# Patient Record
Sex: MALE | Race: WHITE | Employment: OTHER | URBAN - METROPOLITAN AREA
[De-identification: names, ages, dates, MRNs, and addresses within clinical notes are randomized per-mention and may not be internally consistent; named-entity substitution may affect disease eponyms.]

---

## 2017-06-01 ENCOUNTER — APPOINTMENT (OUTPATIENT)
Dept: GENERAL RADIOLOGY | Age: 82
End: 2017-06-01
Attending: EMERGENCY MEDICINE
Payer: MEDICARE

## 2017-06-01 ENCOUNTER — HOSPITAL ENCOUNTER (EMERGENCY)
Age: 82
Discharge: HOME | End: 2017-06-02
Attending: EMERGENCY MEDICINE | Admitting: EMERGENCY MEDICINE
Payer: MEDICARE

## 2017-06-01 ENCOUNTER — APPOINTMENT (OUTPATIENT)
Dept: CT IMAGING | Age: 82
End: 2017-06-01
Attending: PHYSICIAN ASSISTANT
Payer: MEDICARE

## 2017-06-01 VITALS
BODY MASS INDEX: 40.83 KG/M2 | RESPIRATION RATE: 18 BRPM | DIASTOLIC BLOOD PRESSURE: 75 MMHG | WEIGHT: 260.14 LBS | OXYGEN SATURATION: 96 % | TEMPERATURE: 98.7 F | HEIGHT: 67 IN | SYSTOLIC BLOOD PRESSURE: 114 MMHG | HEART RATE: 92 BPM

## 2017-06-01 DIAGNOSIS — S22.32XA CLOSED FRACTURE OF ONE RIB OF LEFT SIDE, INITIAL ENCOUNTER: ICD-10-CM

## 2017-06-01 DIAGNOSIS — R31.9 HEMATURIA: ICD-10-CM

## 2017-06-01 DIAGNOSIS — N39.0 ACUTE UTI (URINARY TRACT INFECTION): Primary | ICD-10-CM

## 2017-06-01 LAB
ALBUMIN SERPL BCP-MCNC: 3.2 G/DL (ref 3.5–5)
ALBUMIN/GLOB SERPL: 0.8 {RATIO} (ref 1.1–2.2)
ALP SERPL-CCNC: 50 U/L (ref 45–117)
ALT SERPL-CCNC: 26 U/L (ref 12–78)
ANION GAP BLD CALC-SCNC: 7 MMOL/L (ref 5–15)
APPEARANCE UR: ABNORMAL
AST SERPL W P-5'-P-CCNC: 14 U/L (ref 15–37)
BACTERIA URNS QL MICRO: ABNORMAL /HPF
BASOPHILS # BLD AUTO: 0 K/UL (ref 0–0.1)
BASOPHILS # BLD: 0 % (ref 0–1)
BILIRUB SERPL-MCNC: 0.7 MG/DL (ref 0.2–1)
BILIRUB UR QL CFM: ABNORMAL
BUN SERPL-MCNC: 19 MG/DL (ref 6–20)
BUN/CREAT SERPL: 17 (ref 12–20)
CALCIUM SERPL-MCNC: 9.4 MG/DL (ref 8.5–10.1)
CHLORIDE SERPL-SCNC: 100 MMOL/L (ref 97–108)
CO2 SERPL-SCNC: 29 MMOL/L (ref 21–32)
COLOR UR: ABNORMAL
CREAT SERPL-MCNC: 1.09 MG/DL (ref 0.7–1.3)
EOSINOPHIL # BLD: 0 K/UL (ref 0–0.4)
EOSINOPHIL NFR BLD: 0 % (ref 0–7)
EPITH CASTS URNS QL MICRO: ABNORMAL /LPF
ERYTHROCYTE [DISTWIDTH] IN BLOOD BY AUTOMATED COUNT: 15.5 % (ref 11.5–14.5)
GLOBULIN SER CALC-MCNC: 4.1 G/DL (ref 2–4)
GLUCOSE SERPL-MCNC: 151 MG/DL (ref 65–100)
GLUCOSE UR STRIP.AUTO-MCNC: NEGATIVE MG/DL
HCT VFR BLD AUTO: 45.9 % (ref 36.6–50.3)
HGB BLD-MCNC: 14.9 G/DL (ref 12.1–17)
HGB UR QL STRIP: ABNORMAL
KETONES UR QL STRIP.AUTO: ABNORMAL MG/DL
LEUKOCYTE ESTERASE UR QL STRIP.AUTO: ABNORMAL
LYMPHOCYTES # BLD AUTO: 8 % (ref 12–49)
LYMPHOCYTES # BLD: 1.1 K/UL (ref 0.8–3.5)
MCH RBC QN AUTO: 30.5 PG (ref 26–34)
MCHC RBC AUTO-ENTMCNC: 32.5 G/DL (ref 30–36.5)
MCV RBC AUTO: 94.1 FL (ref 80–99)
MONOCYTES # BLD: 1.1 K/UL (ref 0–1)
MONOCYTES NFR BLD AUTO: 8 % (ref 5–13)
NEUTS SEG # BLD: 11.2 K/UL (ref 1.8–8)
NEUTS SEG NFR BLD AUTO: 84 % (ref 32–75)
NITRITE UR QL STRIP.AUTO: POSITIVE
PH UR STRIP: 5.5 [PH] (ref 5–8)
PLATELET # BLD AUTO: 192 K/UL (ref 150–400)
POTASSIUM SERPL-SCNC: 4 MMOL/L (ref 3.5–5.1)
PROT SERPL-MCNC: 7.3 G/DL (ref 6.4–8.2)
PROT UR STRIP-MCNC: 100 MG/DL
RBC # BLD AUTO: 4.88 M/UL (ref 4.1–5.7)
RBC #/AREA URNS HPF: >100 /HPF (ref 0–5)
SODIUM SERPL-SCNC: 136 MMOL/L (ref 136–145)
SP GR UR REFRACTOMETRY: 1.02 (ref 1–1.03)
UA: UC IF INDICATED,UAUC: ABNORMAL
UROBILINOGEN UR QL STRIP.AUTO: 1 EU/DL (ref 0.2–1)
WBC # BLD AUTO: 13.4 K/UL (ref 4.1–11.1)
WBC URNS QL MICRO: ABNORMAL /HPF (ref 0–4)

## 2017-06-01 PROCEDURE — 96361 HYDRATE IV INFUSION ADD-ON: CPT

## 2017-06-01 PROCEDURE — 87086 URINE CULTURE/COLONY COUNT: CPT | Performed by: EMERGENCY MEDICINE

## 2017-06-01 PROCEDURE — 74011250636 HC RX REV CODE- 250/636: Performed by: EMERGENCY MEDICINE

## 2017-06-01 PROCEDURE — 80053 COMPREHEN METABOLIC PANEL: CPT | Performed by: PHYSICIAN ASSISTANT

## 2017-06-01 PROCEDURE — 74011250636 HC RX REV CODE- 250/636: Performed by: PHYSICIAN ASSISTANT

## 2017-06-01 PROCEDURE — 71101 X-RAY EXAM UNILAT RIBS/CHEST: CPT

## 2017-06-01 PROCEDURE — 87186 SC STD MICRODIL/AGAR DIL: CPT | Performed by: EMERGENCY MEDICINE

## 2017-06-01 PROCEDURE — 99284 EMERGENCY DEPT VISIT MOD MDM: CPT

## 2017-06-01 PROCEDURE — 36415 COLL VENOUS BLD VENIPUNCTURE: CPT | Performed by: PHYSICIAN ASSISTANT

## 2017-06-01 PROCEDURE — 74011250637 HC RX REV CODE- 250/637: Performed by: PHYSICIAN ASSISTANT

## 2017-06-01 PROCEDURE — 74011636320 HC RX REV CODE- 636/320: Performed by: EMERGENCY MEDICINE

## 2017-06-01 PROCEDURE — 96365 THER/PROPH/DIAG IV INF INIT: CPT

## 2017-06-01 PROCEDURE — 74011000258 HC RX REV CODE- 258: Performed by: PHYSICIAN ASSISTANT

## 2017-06-01 PROCEDURE — 87077 CULTURE AEROBIC IDENTIFY: CPT | Performed by: EMERGENCY MEDICINE

## 2017-06-01 PROCEDURE — 85025 COMPLETE CBC W/AUTO DIFF WBC: CPT | Performed by: PHYSICIAN ASSISTANT

## 2017-06-01 PROCEDURE — 81001 URINALYSIS AUTO W/SCOPE: CPT | Performed by: EMERGENCY MEDICINE

## 2017-06-01 PROCEDURE — 74177 CT ABD & PELVIS W/CONTRAST: CPT

## 2017-06-01 RX ORDER — TAMSULOSIN HYDROCHLORIDE 0.4 MG/1
0.4 CAPSULE ORAL
Status: DISCONTINUED | OUTPATIENT
Start: 2017-06-01 | End: 2017-06-02

## 2017-06-01 RX ORDER — SODIUM CHLORIDE 0.9 % (FLUSH) 0.9 %
10 SYRINGE (ML) INJECTION
Status: COMPLETED | OUTPATIENT
Start: 2017-06-01 | End: 2017-06-01

## 2017-06-01 RX ORDER — SODIUM CHLORIDE 9 MG/ML
50 INJECTION, SOLUTION INTRAVENOUS
Status: COMPLETED | OUTPATIENT
Start: 2017-06-01 | End: 2017-06-02

## 2017-06-01 RX ORDER — HYDROCODONE BITARTRATE AND ACETAMINOPHEN 5; 325 MG/1; MG/1
1 TABLET ORAL
Status: COMPLETED | OUTPATIENT
Start: 2017-06-01 | End: 2017-06-01

## 2017-06-01 RX ADMIN — CEFTRIAXONE 1 G: 1 INJECTION, POWDER, FOR SOLUTION INTRAMUSCULAR; INTRAVENOUS at 21:15

## 2017-06-01 RX ADMIN — HYDROCODONE BITARTRATE AND ACETAMINOPHEN 1 TABLET: 5; 325 TABLET ORAL at 20:40

## 2017-06-01 RX ADMIN — SODIUM CHLORIDE 500 ML: 900 INJECTION, SOLUTION INTRAVENOUS at 20:42

## 2017-06-01 RX ADMIN — SODIUM CHLORIDE 50 ML/HR: 900 INJECTION, SOLUTION INTRAVENOUS at 22:17

## 2017-06-01 RX ADMIN — IOPAMIDOL 100 ML: 755 INJECTION, SOLUTION INTRAVENOUS at 22:18

## 2017-06-01 RX ADMIN — Medication 10 ML: at 22:17

## 2017-06-01 NOTE — ED NOTES
Assumed care of patient from triage. Patient is A&Ox4, respirations even, labored on exertion. Pt. States he fell on Monday and has been having rib pain since then. Today, patient began noticing blood in his urine. Pt. Scout Romero in low bed in POC, side rail x2, call light within reach.

## 2017-06-02 RX ORDER — CIPROFLOXACIN 500 MG/1
500 TABLET ORAL 2 TIMES DAILY
Qty: 20 TAB | Refills: 0 | Status: SHIPPED | OUTPATIENT
Start: 2017-06-02 | End: 2017-06-12

## 2017-06-02 RX ORDER — HYDROCODONE BITARTRATE AND ACETAMINOPHEN 7.5; 325 MG/1; MG/1
1 TABLET ORAL
Qty: 20 TAB | Refills: 0 | Status: SHIPPED | OUTPATIENT
Start: 2017-06-02 | End: 2017-11-16

## 2017-06-02 NOTE — ED NOTES
Félix Chen reviewed discharge instructions with the patient. The patient verbalized understanding. All questions and concerns were addressed. The patient declined a wheelchair and is discharged ambulatory in the care of family members with instructions and prescriptions in hand. Pt is alert and oriented x 4. Respirations are clear and unlabored.

## 2017-06-02 NOTE — DISCHARGE INSTRUCTIONS
Blood in the Urine: Care Instructions  Your Care Instructions  Blood in the urine, or hematuria, may make the urine look red, brown, or pink. There may be blood every time you urinate or just from time to time. You cannot always see blood in the urine, but it will show up in a urine test.  Blood in the urine may be serious. It should always be checked by a doctor. Your doctor may recommend more tests, including an X-ray, a CT scan, or a cystoscopy (which lets a doctor look inside the urethra and bladder). Blood in the urine can be a sign of another problem. Common causes are bladder infections and kidney stones. An injury to your groin or your genital area can also cause bleeding in the urinary tract. Very hard exercise--such as running a marathon--can cause blood in the urine. Blood in the urine can also be a sign of kidney disease or cancer in the bladder or kidney. Many cases of blood in the urine are caused by a harmless condition that runs in families. This is called benign familial hematuria. It does not need any treatment. Sometimes your urine may look red or brown even though it does not contain blood. For example, not getting enough fluids (dehydration), taking certain medicines, or having a liver problem can change the color of your urine. Eating foods such as beets, rhubarb, or blackberries or foods with red food coloring can make your urine look red or pink. Follow-up care is a key part of your treatment and safety. Be sure to make and go to all appointments, and call your doctor if you are having problems. It's also a good idea to know your test results and keep a list of the medicines you take. When should you call for help? Call your doctor now or seek immediate medical care if:  · You have symptoms of a urinary infection. For example:  ¨ You have pus in your urine. ¨ You have pain in your back just below your rib cage. This is called flank pain.   ¨ You have a fever, chills, or body aches.  ¨ It hurts to urinate. ¨ You have groin or belly pain. · You have more blood in your urine. Watch closely for changes in your health, and be sure to contact your doctor if:  · You have new urination problems. · You do not get better as expected. Where can you learn more? Go to http://india-clif.info/. Enter U539 in the search box to learn more about \"Blood in the Urine: Care Instructions. \"  Current as of: August 12, 2016  Content Version: 11.2  © 2179-0155 [employer]. Care instructions adapted under license by [employer] (which disclaims liability or warranty for this information). If you have questions about a medical condition or this instruction, always ask your healthcare professional. [address] any warranty or liability for your use of this information. Broken Rib: Care Instructions  Your Care Instructions    A broken rib is a crack or break in one of the bones of the rib cage. Breathing can be very painful because the muscles used for breathing pull on the rib. In most cases, a broken rib will heal on its own. You can take pain medicine while the rib mends. Pain relief allows you to take deep breaths. In the past, doctors recommended taping or wrapping broken ribs. This is no longer done because taping makes it hard for you to take deep breaths. You need to take deep breaths at least once an hour to prevent pneumonia or a partial collapse of a lung. Your rib will heal in about 6 weeks. You heal best when you take good care of yourself. Eat a variety of healthy foods, and don't smoke. Follow-up care is a key part of your treatment and safety. Be sure to make and go to all appointments, and call your doctor if you are having problems. It's also a good idea to know your test results and keep a list of the medicines you take. How can you care for yourself at home? · Be safe with medicines.  Read and follow all instructions on the label. ¨ If the doctor gave you a prescription medicine for pain, take it as prescribed. ¨ If you are not taking a prescription pain medicine, ask your doctor if you can take an over-the-counter medicine. · Even if it hurts, cough or take the deepest breath you can at least once every hour. This will get air deeply into your lungs and reduce your chance of getting pneumonia or a partial collapse of a lung. Hold a pillow against your chest to make this less painful. · Put ice or a cold pack on the area for 10 to 20 minutes at a time. Put a thin cloth between the ice and your skin. When should you call for help? Call 911 anytime you think you may need emergency care. For example, call if:  · You have severe trouble breathing. Call your doctor now or seek immediate medical care if:  · You have some trouble breathing. · You have a fever. · You have a new or worse cough. Watch closely for changes in your health, and be sure to contact your doctor if:  · You have pain even after taking your medicine. · You do not get better as expected. Where can you learn more? Go to http://india-clif.info/. Enter M135 in the search box to learn more about \"Broken Rib: Care Instructions. \"  Current as of: November 3, 2016  Content Version: 11.2  © 0783-2724 [employer]. Care instructions adapted under license by [employer] (which disclaims liability or warranty for this information). If you have questions about a medical condition or this instruction, always ask your healthcare professional. [place] [address] any warranty or liability for your use of this information. Urinary Tract Infections in Men: Care Instructions  Your Care Instructions    A urinary tract infection, or UTI, is a general term for an infection anywhere between the kidneys and the tip of the penis. UTIs can also be a result of a prostate problem.  Most cause pain or burning when you urinate. Most UTIs are caused by bacteria and can be cured with antibiotics. It is important to complete your treatment so that the infection does not get worse. Follow-up care is a key part of your treatment and safety. Be sure to make and go to all appointments, and call your doctor if you are having problems. It's also a good idea to know your test results and keep a list of the medicines you take. How can you care for yourself at home? · Take your antibiotics as prescribed. Do not stop taking them just because you feel better. You need to take the full course of antibiotics. · Take your medicines exactly as prescribed. Your doctor may have prescribed a medicine, such as phenazopyridine (Pyridium), to help relieve pain when you urinate. This turns your urine orange. You may stop taking it when your symptoms get better. But be sure to take all of your antibiotics, which treat the infection. · Drink extra water for the next day or two. This will help make the urine less concentrated and help wash out the bacteria causing the infection. (If you have kidney, heart, or liver disease and have to limit your fluids, talk with your doctor before you increase your fluid intake.)  · Avoid drinks that are carbonated or have caffeine. They can irritate the bladder. · Urinate often. Try to empty your bladder each time. · To relieve pain, take a hot bath or lay a heating pad (set on low) over your lower belly or genital area. Never go to sleep with a heating pad in place. To help prevent UTIs  · Drink plenty of fluids, enough so that your urine is light yellow or clear like water. If you have kidney, heart, or liver disease and have to limit fluids, talk with your doctor before you increase the amount of fluids you drink. · Urinate when you have the urge. Do not hold your urine for a long time. Urinate before you go to sleep. · Keep your penis clean.   Catheter care  If you have a drainage tube (catheter) in place, the following steps will help you care for it. · Always wash your hands before and after touching your catheter. · Check the area around the urethra for inflammation or signs of infection. Signs of infection include irritated, swollen, red, or tender skin, or pus around the catheter. · Clean the area around the catheter with soap and water two times a day. Dry with a clean towel afterward. · Do not apply powder or lotion to the skin around the catheter. To empty the urine collection bag  · Wash your hands with soap and water. · Without touching the drain spout, remove the spout from its sleeve at the bottom of the collection bag. Open the valve on the spout. · Let the urine flow out of the bag and into the toilet or a container. Do not let the tubing or drain spout touch anything. · After you empty the bag, clean the end of the drain spout with tissue and water. Close the valve and put the drain spout back into its sleeve at the bottom of the collection bag. · Wash your hands with soap and water. When should you call for help? Call your doctor now or seek immediate medical care if:  · Symptoms such as a fever, chills, nausea, or vomiting get worse or happen for the first time. · You have new pain in your back just below your rib cage. This is called flank pain. · There is new blood or pus in your urine. · You are not able to take or keep down your antibiotics. Watch closely for changes in your health, and be sure to contact your doctor if:  · You are not getting better after taking an antibiotic for 2 days. · Your symptoms go away but then come back. Where can you learn more? Go to http://india-clif.info/. Enter F251 in the search box to learn more about \"Urinary Tract Infections in Men: Care Instructions. \"  Current as of: November 28, 2016  Content Version: 11.2  © 5878-9111 [employer].  Care instructions adapted under license by [employer] (which disclaims liability or warranty for this information). If you have questions about a medical condition or this instruction, always ask your healthcare professional. [address] any warranty or liability for your use of this information.

## 2017-06-02 NOTE — ED PROVIDER NOTES
HPI Comments: Erin Vera is a 80 y.o. male with history significant for HTN, arthritis, and CAD presenting ambulatory to [id number] [place] [address] ED with c/o sudden onset of hematuria, decreased urine output, and right sided pelvic pain. Per pt, he noticed the hematuria in his urine x yesterday and visualized decreased urine output today. Pt also informs of mild right sided pelvic pain. Pt reports his urinary symptoms got worse and presented in a similar manner to his past UTIs. Pt reports he has recurrent kidney stones and currently has some right sided ones which have been nonsymptomatic. Pt additionally informs of pain to the left anterior rib status post a fall sustained on 05/31/2017. Pt reports his pain to be a moderate 8/10 on a pain scale with an associated sharp sensation to the left ribs Pt reports taking Tylenol for his discomfort with little relief. Pt specifically denies any thyroid or liver disease. Pt additionally specifically denies any nausea, vomiting, fevers, chills, abdominal pain, chest pain, or SOB. PCP: Brianda Dubon MD    PMHx: hyperlipidemia   PSHx: appendectomy  Social Hx: - EtOH; Former Smoker; - Illicit Drugs    There are no other changes, complaints or physical findings at this time. Written by MIRIAM aKy, as dictated by [employer]. The history is provided by the patient.       Past Medical History:   Diagnosis Date    Arthritis     CAD (coronary artery disease)     Chest pain, unspecified     Essential hypertension     Essential hypertension, benign     GERD (gastroesophageal reflux disease)     lower back    Hypertension     Lung cancer ([facility] [address]     Mixed hyperlipidemia     Recurrent kidney stones      Past Surgical History:   Procedure Laterality Date    CHEST SURGERY PROCEDURE UNLISTED  9/3/13    left thoracoscopy; left upper lobectomy; left pericostal nerve block; lymph node dissection    HX APPENDECTOMY      HX HEART CATHETERIZATION      HX HEENT tonsillectomy    HX ORTHOPAEDIC      back surgery x2 1977, right shoulder surgery    HX ORTHOPAEDIC      bilateral knee replacement    HX OTHER SURGICAL      neck or cancer removal     Family History:   Problem Relation Age of Onset    Diabetes Brother     Heart Disease Brother      Social History     Social History    Marital status:      Spouse name: N/A    Number of children: N/A    Years of education: N/A     Occupational History    Not on file. Social History Main Topics    Smoking status: Former Smoker     Packs/day: 1.00     Years: 20.00     Quit date: 1/12/1985    Smokeless tobacco: Never Used    Alcohol use No    Drug use: No    Sexual activity: Not on file     Other Topics Concern    Not on file     Social History Narrative     ALLERGIES: Other medication    Review of Systems   Constitutional: Negative. Negative for chills and fever. HENT: Negative. Eyes: Negative. Respiratory: Negative. Negative for shortness of breath. Cardiovascular: Negative. Gastrointestinal: Negative. Negative for constipation and diarrhea. Denies liver disease   Endocrine:        Denies thyroid disease   Genitourinary: Positive for decreased urine volume and hematuria. Negative for dysuria.        + R pelvic pain   Musculoskeletal: Positive for arthralgias (L rib) and myalgias (L rib). Skin: Negative. Neurological: Negative. All other systems reviewed and are negative. Vitals:    06/01/17 1848   BP: 114/75   Pulse: 92   Resp: 18   Temp: 98.7 °F (37.1 °C)   SpO2: 96%   Weight: 118 kg (260 lb 2.3 oz)   Height: 5' 7\" (1.702 m)          Physical Exam   Constitutional: He is oriented to person, place, and time. He appears well-developed and well-nourished. No distress. HENT:   Head: Normocephalic and atraumatic. Right Ear: External ear normal.   Left Ear: External ear normal.   Nose: Nose normal.   Mouth/Throat: Oropharynx is clear and moist. No oropharyngeal exudate. Dentures in place    Eyes: Conjunctivae and EOM are normal. Pupils are equal, round, and reactive to light. Right eye exhibits no discharge. Left eye exhibits no discharge. No scleral icterus. Neck: Normal range of motion. Neck supple. No tracheal deviation present. Cardiovascular: Normal rate, regular rhythm, normal heart sounds and intact distal pulses. Exam reveals no gallop and no friction rub. No murmur heard. Pulmonary/Chest: Effort normal and breath sounds normal. No respiratory distress. He has no wheezes. He has no rales. He exhibits no tenderness. Abdominal: Soft. Bowel sounds are normal. He exhibits no distension and no mass. There is no tenderness. There is no rebound, no guarding and no CVA tenderness. Musculoskeletal: He exhibits no edema or tenderness. Contusion to anterior inferior aspect of L rib, tender to palpation; no bony tenderness to spine/neck   Lymphadenopathy:     He has no cervical adenopathy. Neurological: He is alert and oriented to person, place, and time. No cranial nerve deficit. Coordination normal.   Skin: Skin is warm and dry. No rash noted. No erythema. Psychiatric: He has a normal mood and affect. His behavior is normal. Judgment and thought content normal.   Nursing note and vitals reviewed.      MDM  Number of Diagnoses or Management Options  Closed fracture of one rib of left side, initial encounter:   Hematuria:   Pyelonephritis:   Diagnosis management comments: DDx: sprain, strain, UTI, kidney stone, rib fracture, contusion, hematuria       Amount and/or Complexity of Data Reviewed  Clinical lab tests: reviewed and ordered  Tests in the radiology section of CPT®: reviewed and ordered  Review and summarize past medical records: yes  Discuss the patient with other providers: yes (Dr. Rex Mcmahon, Urology  Dr. Stacy Guerra, Hospitalist)  Independent visualization of images, tracings, or specimens: yes      ED Course     Procedures    Progress Note:   9:22 PM  Discussed the pt with Anil Moctezuma. Dennis Lind MD. States to order a CT of the ABD/PEL with contrast.  Written by MIRIAM Dobbins, as dictated by Silke Rob. Progress Note:   11:00 PM  Daughter reports that the pt is a prior lung cancer patient. Written by MIRIAM Dobbins, as dictated by Silke Rob. Progress Note:   11:03 PM  Consulted Rashawn Lind MD for pt who advised to talk to vascular surgery and urology. Written by MIRIAM Dobbins, as dictated by Silke Rob. Progress Note:   11:05 PM  Discussed returned results with the pt and family alongside further progression of care plan. Answered all questions and concerns as needed. Written by MIRIAM Dobbins, as dictated by Silke Rob. Progress Note:   11:10 PM  Pt states he is aware of the anuerysms in his iliac artery and notes that his Cardiologist, Dr. Abreu , is following them. Pt has urinated around 30 mL of dark yellow urine. Pt reports no pain. Written by MIRIAM Dobbins, as dictated by Silke Rob. CONSULT NOTE:   11:42 PM  ANNABELLE Urias spoke with Dr. Nicole Wagner,  Specialty: Urology  Discussed pt's hx, disposition, and available diagnostic and imaging results. Reviewed care plans. Consultant agrees with plans as outlined. States if there is nothing surgical, to consult with the hospitalist for admission. All urological stances can be done as outpatient. Written by MIRIAM Dobbins, as dictated by Silke Rob. Progress Note:   11:45 PM  Discussed the urology consult with the pt and family. Written by MIRIAM Dobbins, as dictated by Silke Rob. CONSULT NOTE:   11:48 PM  ANNABELLE Urias spoke with Dr. Cami Rose,  Specialty: Hospitalist  Discussed pt's hx, disposition, and available diagnostic and imaging results. Reviewed care plans. Consultant agrees with plans as outlined.  Advises to administer and prescribe Flomax and outpatient follow up with Urology/PCP. Written by MIRIAM Jerome, as dictated by Murray Mcdonald. Progress Note:   11:55 AM  Pt reports he takes Flomax daily and does not need a prescription. Written by MIRIAM Jerome, as dictated by Murray Mcdonald. Progress Note:   12:01 AM  Pt ambulatory to the restroom with difficulty. Written by MIRIAM Jerome, as dictated by Murray Mcdonald. LABORATORY TESTS:  Recent Results (from the past 12 hour(s))   URINALYSIS W/ REFLEX CULTURE    Collection Time: 06/01/17  7:38 PM   Result Value Ref Range    Color BROWN      Appearance OPAQUE (A) CLEAR      Specific gravity 1.020 1.003 - 1.030      pH (UA) 5.5 5.0 - 8.0      Protein 100 (A) NEG mg/dL    Glucose NEGATIVE  NEG mg/dL    Ketone TRACE (A) NEG mg/dL    Blood LARGE (A) NEG      Urobilinogen 1.0 0.2 - 1.0 EU/dL    Nitrites POSITIVE (A) NEG      Leukocyte Esterase LARGE (A) NEG      WBC  0 - 4 /hpf    RBC >100 (H) 0 - 5 /hpf    Epithelial cells FEW FEW /lpf    Bacteria 1+ (A) NEG /hpf    UA:UC IF INDICATED URINE CULTURE ORDERED (A) CNI     BILIRUBIN, CONFIRM    Collection Time: 06/01/17  7:38 PM   Result Value Ref Range    Bilirubin UA, confirm INDETERMINATE DUE TO COLOR INTERFERENCE (A) NEG     CBC WITH AUTOMATED DIFF    Collection Time: 06/01/17  8:49 PM   Result Value Ref Range    WBC 13.4 (H) 4.1 - 11.1 K/uL    RBC 4.88 4.10 - 5.70 M/uL    HGB 14.9 12.1 - 17.0 g/dL    HCT 45.9 36.6 - 50.3 %    MCV 94.1 80.0 - 99.0 FL    MCH 30.5 26.0 - 34.0 PG    MCHC 32.5 30.0 - 36.5 g/dL    RDW 15.5 (H) 11.5 - 14.5 %    PLATELET 724 347 - 718 K/uL    NEUTROPHILS 84 (H) 32 - 75 %    LYMPHOCYTES 8 (L) 12 - 49 %    MONOCYTES 8 5 - 13 %    EOSINOPHILS 0 0 - 7 %    BASOPHILS 0 0 - 1 %    ABS. NEUTROPHILS 11.2 (H) 1.8 - 8.0 K/UL    ABS. LYMPHOCYTES 1.1 0.8 - 3.5 K/UL    ABS. MONOCYTES 1.1 (H) 0.0 - 1.0 K/UL    ABS. EOSINOPHILS 0.0 0.0 - 0.4 K/UL    ABS.  BASOPHILS 0.0 0.0 - 0.1 K/UL   METABOLIC PANEL, COMPREHENSIVE    Collection Time: 06/01/17  8:49 PM   Result Value Ref Range    Sodium 136 136 - 145 mmol/L    Potassium 4.0 3.5 - 5.1 mmol/L    Chloride 100 97 - 108 mmol/L    CO2 29 21 - 32 mmol/L    Anion gap 7 5 - 15 mmol/L    Glucose 151 (H) 65 - 100 mg/dL    BUN 19 6 - 20 MG/DL    Creatinine 1.09 0.70 - 1.30 MG/DL    BUN/Creatinine ratio 17 12 - 20      GFR est AA >60 >60 ml/min/1.73m2    GFR est non-AA >60 >60 ml/min/1.73m2    Calcium 9.4 8.5 - 10.1 MG/DL    Bilirubin, total 0.7 0.2 - 1.0 MG/DL    ALT (SGPT) 26 12 - 78 U/L    AST (SGOT) 14 (L) 15 - 37 U/L    Alk. phosphatase 50 45 - 117 U/L    Protein, total 7.3 6.4 - 8.2 g/dL    Albumin 3.2 (L) 3.5 - 5.0 g/dL    Globulin 4.1 (H) 2.0 - 4.0 g/dL    A-G Ratio 0.8 (L) 1.1 - 2.2       IMAGING RESULTS:  INDICATION: Gross hematuria with history of kidney stones, left rib pain since  fall on Monday     COMPARISON: 1/30/2015     TECHNIQUE:   Following the uneventful intravenous administration of 100 cc Isovue-370, thin  axial images were obtained through the abdomen and pelvis. Coronal and sagittal  reconstructions were generated. Oral contrast was not administered. CT dose  reduction was achieved through use of a standardized protocol tailored for this  examination and automatic exposure control for dose modulation.     FINDINGS:   LUNG BASES: Clear. INCIDENTALLY IMAGED HEART AND MEDIASTINUM: Unremarkable. LIVER: No mass or biliary dilatation. GALLBLADDER: Unremarkable. SPLEEN: No mass. PANCREAS: No mass or ductal dilatation. ADRENALS: Unremarkable. 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 Two punctate nonobstructing  left renal stones are noted. No ureteral stone or hydronephrosis. STOMACH: Unremarkable. SMALL BOWEL: No dilatation or wall thickening. COLON: Colonic diverticulosis is noted. APPENDIX: Surgically absent. PERITONEUM: No ascites or pneumoperitoneum. RETROPERITONEUM: No lymphadenopathy or aortic aneurysm.  However, there is a 3.6  cm right common iliac artery aneurysm and a 2.5 cm left common iliac artery  aneurysm. REPRODUCTIVE ORGANS: The prostate is enlarged measuring 5.2 cm. URINARY BLADDER: Mild bladder wall thickening is noted potentially related to  bladder outlet obstruction. BONES: Degenerative changes are seen in the lumbar spine. ADDITIONAL COMMENTS: N/A     IMPRESSION  IMPRESSION:  2 punctate nonobstructing left renal stones. No ureteral stone or  hydronephrosis. Bilateral common iliac artery aneurysms. Prostate enlargement. EXAM: XR RIBS LT W PA CXR MIN 3 V     INDICATION: pain after fall     COMPARISON: 4/9/2014     FINDINGS: A frontal radiograph of the chest and 3 oblique views of the left ribs  demonstrate no pneumothorax or pleural effusion. There is persistent elevation  of the left hemidiaphragm with stable borderline bilateral hilar fullness. There  is irregularity of the left anterior ninth rib. The left anterior fifth rib  deformity appears old. The left mid clavicular fracture is well-healed.     IMPRESSION  IMPRESSION: Age indeterminate fracture of the left anterior ninth rib. Borderline stable bilateral hilar fullness    MEDICATIONS GIVEN:  Medications   HYDROcodone-acetaminophen (NORCO) 5-325 mg per tablet 1 Tab (1 Tab Oral Given 6/1/17 2040)   sodium chloride 0.9 % bolus infusion 500 mL (0 mL IntraVENous IV Completed 6/1/17 2142)   cefTRIAXone (ROCEPHIN) 1 g in 0.9% sodium chloride (MBP/ADV) 50 mL (0 g IntraVENous IV Completed 6/1/17 2145)   0.9% sodium chloride infusion (0 mL/hr IntraVENous IV Completed 6/2/17 0006)   iopamidol (ISOVUE-370) 76 % injection 100 mL (100 mL IntraVENous Given 6/1/17 2218)   sodium chloride (NS) flush 10 mL (10 mL IntraVENous Given 6/1/17 2217)     IMPRESSION:  1. Acute UTI (urinary tract infection)    2. Hematuria    3. Closed fracture of one rib of left side, initial encounter      PLAN:  1.    Discharge Medication List as of 6/2/2017 12:01 AM      START taking these medications    Details   ciprofloxacin HCl (CIPRO) 500 mg tablet Take 1 Tab by mouth two (2) times a day for 10 days. , Print, Disp-20 Tab, R-0         CONTINUE these medications which have CHANGED    Details   HYDROcodone-acetaminophen (NORCO) 7.5-325 mg per tablet Take 1 Tab by mouth every six (6) hours as needed for Pain. Max Daily Amount: 4 Tabs., Print, Disp-20 Tab, R-0         CONTINUE these medications which have NOT CHANGED    Details   diphenoxylate-atropine (LOMOTIL) 2.5-0.025 mg per tablet Take 1 tablet by mouth four (4) times daily as needed for Diarrhea., Print, Disp-20 tablet, R-0      ondansetron (ZOFRAN ODT) 4 mg disintegrating tablet Take 1 tablet by mouth every eight (8) hours as needed for Nausea. , Print, Disp-10 tablet, R-0      promethazine (PHENERGAN) 25 mg suppository Insert 1 suppository into rectum every six (6) hours as needed for Nausea. , Print, Disp-8 suppository, R-0      aspirin 81 mg chewable tablet Take 81 mg by mouth daily. , Historical Med      acetaminophen (TYLENOL) 500 mg tablet Take  by mouth every six (6) hours as needed. Historical Med      metoprolol-XL (TOPROL XL) 25 mg XL tablet Take 12.5 mg by mouth daily. Historical Med, 12.5 mg      tamsulosin (FLOMAX) 0.4 mg capsule Take 0.4 mg by mouth daily. Historical Med, 0.4 mg      enalapril (VASOTEC) 10 mg tablet Take 10 mg by mouth daily. Historical Med, 10 mg           2. Follow-up Information     Follow up With Details Comments [address], [place]   [facility] [id number] [address]  [address] [facility]  [phone]      [facility] EMERGENCY DEPT  If symptoms worsen [address] [facility]  [address]   [address] [address] [place]        Return to ED if worse     DISCHARGE NOTE:    12:03 AM  The patient is ready for discharge. The patient signs, symptoms, diagnosis, and discharge instructions have been discussed and the patient has conveyed their understanding. The patient is to follow-up as reccommended or returned to the ER should their symptoms worsen. Plan has been discussed and patient is in agreement. This note is prepared by Cheyenne Alvarez acting as Scribe for Maryam Recio. PA-C Halina Schlatter: This scribe's documentation has been prepared under my direction and personally reviewed by me in its entirety. I confirm that the note above accurately reflects all work, treatment procedures and medical decision makings performed by me.

## 2017-06-03 LAB
BACTERIA SPEC CULT: ABNORMAL
CC UR VC: ABNORMAL
SERVICE CMNT-IMP: ABNORMAL

## 2017-11-16 ENCOUNTER — HOSPITAL ENCOUNTER (INPATIENT)
Age: 82
LOS: 2 days | Discharge: HOME | DRG: 872 | End: 2017-11-18
Attending: EMERGENCY MEDICINE | Admitting: HOSPITALIST
Payer: MEDICARE

## 2017-11-16 ENCOUNTER — APPOINTMENT (OUTPATIENT)
Dept: CT IMAGING | Age: 82
DRG: 872 | End: 2017-11-16
Attending: EMERGENCY MEDICINE
Payer: MEDICARE

## 2017-11-16 DIAGNOSIS — N39.0 COMPLICATED UTI (URINARY TRACT INFECTION): Primary | ICD-10-CM

## 2017-11-16 PROBLEM — A41.9 SEPSIS (HCC): Status: ACTIVE | Noted: 2017-11-16

## 2017-11-16 LAB
ALBUMIN SERPL-MCNC: 2.9 G/DL (ref 3.5–5)
ALBUMIN/GLOB SERPL: 0.6 {RATIO} (ref 1.1–2.2)
ALP SERPL-CCNC: 70 U/L (ref 45–117)
ALT SERPL-CCNC: 37 U/L (ref 12–78)
ANION GAP SERPL CALC-SCNC: 10 MMOL/L (ref 5–15)
APPEARANCE UR: ABNORMAL
AST SERPL-CCNC: 24 U/L (ref 15–37)
ATRIAL RATE: 119 BPM
BACTERIA URNS QL MICRO: ABNORMAL /HPF
BASOPHILS # BLD: 0 K/UL
BASOPHILS NFR BLD: 0 %
BILIRUB SERPL-MCNC: 1.5 MG/DL (ref 0.2–1)
BILIRUB UR QL CFM: NEGATIVE
BUN SERPL-MCNC: 19 MG/DL (ref 6–20)
BUN/CREAT SERPL: 16 (ref 12–20)
CALCIUM SERPL-MCNC: 8.9 MG/DL (ref 8.5–10.1)
CALCULATED P AXIS, ECG09: 18 DEGREES
CALCULATED R AXIS, ECG10: -85 DEGREES
CALCULATED T AXIS, ECG11: 54 DEGREES
CHLORIDE SERPL-SCNC: 98 MMOL/L (ref 97–108)
CO2 SERPL-SCNC: 25 MMOL/L (ref 21–32)
COLOR UR: ABNORMAL
CREAT SERPL-MCNC: 1.22 MG/DL (ref 0.7–1.3)
DIAGNOSIS, 93000: NORMAL
DIFFERENTIAL METHOD BLD: ABNORMAL
EOSINOPHIL # BLD: 0.3 K/UL
EOSINOPHIL NFR BLD: 2 %
EPITH CASTS URNS QL MICRO: ABNORMAL /LPF
ERYTHROCYTE [DISTWIDTH] IN BLOOD BY AUTOMATED COUNT: 15.9 % (ref 11.5–14.5)
GLOBULIN SER CALC-MCNC: 4.9 G/DL (ref 2–4)
GLUCOSE SERPL-MCNC: 184 MG/DL (ref 65–100)
GLUCOSE UR STRIP.AUTO-MCNC: NEGATIVE MG/DL
HCT VFR BLD AUTO: 49.1 % (ref 36.6–50.3)
HGB BLD-MCNC: 16.4 G/DL (ref 12.1–17)
HGB UR QL STRIP: ABNORMAL
KETONES UR QL STRIP.AUTO: ABNORMAL MG/DL
LACTATE SERPL-SCNC: 2 MMOL/L (ref 0.4–2)
LEUKOCYTE ESTERASE UR QL STRIP.AUTO: ABNORMAL
LIPASE SERPL-CCNC: 165 U/L (ref 73–393)
LYMPHOCYTES # BLD: 0.9 K/UL
LYMPHOCYTES NFR BLD: 7 %
MAGNESIUM SERPL-MCNC: 1.9 MG/DL (ref 1.6–2.4)
MCH RBC QN AUTO: 30.8 PG (ref 26–34)
MCHC RBC AUTO-ENTMCNC: 33.4 G/DL (ref 30–36.5)
MCV RBC AUTO: 92.1 FL (ref 80–99)
MONOCYTES # BLD: 1.3 K/UL
MONOCYTES NFR BLD: 10 %
MUCOUS THREADS URNS QL MICRO: ABNORMAL /LPF
NEUTS SEG # BLD: 10.6 K/UL
NEUTS SEG NFR BLD: 81 %
NITRITE UR QL STRIP.AUTO: POSITIVE
P-R INTERVAL, ECG05: 156 MS
PH UR STRIP: 6 [PH] (ref 5–8)
PLATELET # BLD AUTO: 145 K/UL (ref 150–400)
POTASSIUM SERPL-SCNC: 4.5 MMOL/L (ref 3.5–5.1)
PROT SERPL-MCNC: 7.8 G/DL (ref 6.4–8.2)
PROT UR STRIP-MCNC: 100 MG/DL
Q-T INTERVAL, ECG07: 346 MS
QRS DURATION, ECG06: 136 MS
QTC CALCULATION (BEZET), ECG08: 486 MS
RBC # BLD AUTO: 5.33 M/UL (ref 4.1–5.7)
RBC #/AREA URNS HPF: ABNORMAL /HPF (ref 0–5)
RBC MORPH BLD: ABNORMAL
SODIUM SERPL-SCNC: 133 MMOL/L (ref 136–145)
SP GR UR REFRACTOMETRY: 1.02 (ref 1–1.03)
TROPONIN I SERPL-MCNC: <0.04 NG/ML
UA: UC IF INDICATED,UAUC: ABNORMAL
UROBILINOGEN UR QL STRIP.AUTO: 1 EU/DL (ref 0.2–1)
VENTRICULAR RATE, ECG03: 119 BPM
WBC # BLD AUTO: 13.1 K/UL (ref 4.1–11.1)
WBC URNS QL MICRO: ABNORMAL /HPF (ref 0–4)

## 2017-11-16 PROCEDURE — 85025 COMPLETE CBC W/AUTO DIFF WBC: CPT | Performed by: EMERGENCY MEDICINE

## 2017-11-16 PROCEDURE — 74011250636 HC RX REV CODE- 250/636: Performed by: EMERGENCY MEDICINE

## 2017-11-16 PROCEDURE — 65270000015 HC RM PRIVATE ONCOLOGY

## 2017-11-16 PROCEDURE — 74011250637 HC RX REV CODE- 250/637: Performed by: HOSPITALIST

## 2017-11-16 PROCEDURE — 87040 BLOOD CULTURE FOR BACTERIA: CPT | Performed by: EMERGENCY MEDICINE

## 2017-11-16 PROCEDURE — 94761 N-INVAS EAR/PLS OXIMETRY MLT: CPT

## 2017-11-16 PROCEDURE — 99285 EMERGENCY DEPT VISIT HI MDM: CPT

## 2017-11-16 PROCEDURE — 96374 THER/PROPH/DIAG INJ IV PUSH: CPT

## 2017-11-16 PROCEDURE — 51702 INSERT TEMP BLADDER CATH: CPT

## 2017-11-16 PROCEDURE — 87077 CULTURE AEROBIC IDENTIFY: CPT | Performed by: EMERGENCY MEDICINE

## 2017-11-16 PROCEDURE — 74011000258 HC RX REV CODE- 258: Performed by: EMERGENCY MEDICINE

## 2017-11-16 PROCEDURE — 96375 TX/PRO/DX INJ NEW DRUG ADDON: CPT

## 2017-11-16 PROCEDURE — 87086 URINE CULTURE/COLONY COUNT: CPT | Performed by: EMERGENCY MEDICINE

## 2017-11-16 PROCEDURE — 81001 URINALYSIS AUTO W/SCOPE: CPT | Performed by: EMERGENCY MEDICINE

## 2017-11-16 PROCEDURE — 77030034849

## 2017-11-16 PROCEDURE — 74011250636 HC RX REV CODE- 250/636: Performed by: HOSPITALIST

## 2017-11-16 PROCEDURE — 93005 ELECTROCARDIOGRAM TRACING: CPT

## 2017-11-16 PROCEDURE — 74176 CT ABD & PELVIS W/O CONTRAST: CPT

## 2017-11-16 PROCEDURE — 36415 COLL VENOUS BLD VENIPUNCTURE: CPT | Performed by: EMERGENCY MEDICINE

## 2017-11-16 PROCEDURE — 83605 ASSAY OF LACTIC ACID: CPT | Performed by: EMERGENCY MEDICINE

## 2017-11-16 PROCEDURE — 87186 SC STD MICRODIL/AGAR DIL: CPT | Performed by: EMERGENCY MEDICINE

## 2017-11-16 PROCEDURE — 84484 ASSAY OF TROPONIN QUANT: CPT | Performed by: EMERGENCY MEDICINE

## 2017-11-16 PROCEDURE — 83735 ASSAY OF MAGNESIUM: CPT | Performed by: EMERGENCY MEDICINE

## 2017-11-16 PROCEDURE — 83690 ASSAY OF LIPASE: CPT | Performed by: EMERGENCY MEDICINE

## 2017-11-16 PROCEDURE — 80053 COMPREHEN METABOLIC PANEL: CPT | Performed by: EMERGENCY MEDICINE

## 2017-11-16 RX ORDER — METOPROLOL SUCCINATE 50 MG/1
25 TABLET, EXTENDED RELEASE ORAL DAILY
COMMUNITY
End: 2022-01-04

## 2017-11-16 RX ORDER — AMOXICILLIN 500 MG/1
2 TABLET, FILM COATED ORAL AS NEEDED
COMMUNITY
End: 2017-11-16

## 2017-11-16 RX ORDER — ACETAMINOPHEN AND CODEINE PHOSPHATE 300; 30 MG/1; MG/1
1 TABLET ORAL
COMMUNITY

## 2017-11-16 RX ORDER — METOPROLOL SUCCINATE 25 MG/1
25 TABLET, EXTENDED RELEASE ORAL DAILY
Status: DISCONTINUED | OUTPATIENT
Start: 2017-11-16 | End: 2017-11-18 | Stop reason: HOSPADM

## 2017-11-16 RX ORDER — DOCUSATE SODIUM 100 MG/1
100 CAPSULE, LIQUID FILLED ORAL 2 TIMES DAILY
Status: DISCONTINUED | OUTPATIENT
Start: 2017-11-16 | End: 2017-11-18 | Stop reason: HOSPADM

## 2017-11-16 RX ORDER — SODIUM CHLORIDE 9 MG/ML
100 INJECTION, SOLUTION INTRAVENOUS CONTINUOUS
Status: DISCONTINUED | OUTPATIENT
Start: 2017-11-16 | End: 2017-11-18 | Stop reason: HOSPADM

## 2017-11-16 RX ORDER — SODIUM CHLORIDE 0.9 % (FLUSH) 0.9 %
5-10 SYRINGE (ML) INJECTION AS NEEDED
Status: DISCONTINUED | OUTPATIENT
Start: 2017-11-16 | End: 2017-11-18 | Stop reason: HOSPADM

## 2017-11-16 RX ORDER — SODIUM CHLORIDE 0.9 % (FLUSH) 0.9 %
5-10 SYRINGE (ML) INJECTION EVERY 8 HOURS
Status: DISCONTINUED | OUTPATIENT
Start: 2017-11-16 | End: 2017-11-18 | Stop reason: HOSPADM

## 2017-11-16 RX ORDER — AMOXICILLIN 500 MG/1
500 CAPSULE ORAL 3 TIMES DAILY
COMMUNITY
End: 2017-11-18

## 2017-11-16 RX ORDER — SODIUM CHLORIDE 0.9 % (FLUSH) 0.9 %
5-10 SYRINGE (ML) INJECTION EVERY 8 HOURS
Status: DISCONTINUED | OUTPATIENT
Start: 2017-11-16 | End: 2017-11-17

## 2017-11-16 RX ORDER — INDOMETHACIN 50 MG/1
50 CAPSULE ORAL
COMMUNITY
End: 2017-11-16

## 2017-11-16 RX ORDER — ENOXAPARIN SODIUM 100 MG/ML
40 INJECTION SUBCUTANEOUS EVERY 24 HOURS
Status: DISCONTINUED | OUTPATIENT
Start: 2017-11-16 | End: 2017-11-18 | Stop reason: HOSPADM

## 2017-11-16 RX ORDER — ENALAPRIL MALEATE 5 MG/1
10 TABLET ORAL DAILY
Status: DISCONTINUED | OUTPATIENT
Start: 2017-11-16 | End: 2017-11-18 | Stop reason: HOSPADM

## 2017-11-16 RX ORDER — SODIUM CHLORIDE 0.9 % (FLUSH) 0.9 %
5-10 SYRINGE (ML) INJECTION AS NEEDED
Status: DISCONTINUED | OUTPATIENT
Start: 2017-11-16 | End: 2017-11-17

## 2017-11-16 RX ORDER — ONDANSETRON 2 MG/ML
4 INJECTION INTRAMUSCULAR; INTRAVENOUS
Status: COMPLETED | OUTPATIENT
Start: 2017-11-16 | End: 2017-11-16

## 2017-11-16 RX ORDER — TAMSULOSIN HYDROCHLORIDE 0.4 MG/1
0.4 CAPSULE ORAL DAILY
Status: DISCONTINUED | OUTPATIENT
Start: 2017-11-16 | End: 2017-11-18 | Stop reason: HOSPADM

## 2017-11-16 RX ORDER — MORPHINE SULFATE 2 MG/ML
2 INJECTION, SOLUTION INTRAMUSCULAR; INTRAVENOUS
Status: COMPLETED | OUTPATIENT
Start: 2017-11-16 | End: 2017-11-16

## 2017-11-16 RX ORDER — ACETAMINOPHEN 325 MG/1
650 TABLET ORAL
Status: DISCONTINUED | OUTPATIENT
Start: 2017-11-16 | End: 2017-11-18 | Stop reason: HOSPADM

## 2017-11-16 RX ORDER — AMOXICILLIN 500 MG/1
2000 CAPSULE ORAL
COMMUNITY
End: 2017-11-18

## 2017-11-16 RX ORDER — ONDANSETRON 2 MG/ML
4 INJECTION INTRAMUSCULAR; INTRAVENOUS
Status: DISCONTINUED | OUTPATIENT
Start: 2017-11-16 | End: 2017-11-18 | Stop reason: HOSPADM

## 2017-11-16 RX ORDER — PREDNISONE 20 MG/1
60 TABLET ORAL
COMMUNITY
End: 2017-11-18

## 2017-11-16 RX ADMIN — Medication 10 ML: at 16:54

## 2017-11-16 RX ADMIN — ONDANSETRON HYDROCHLORIDE 4 MG: 2 INJECTION, SOLUTION INTRAMUSCULAR; INTRAVENOUS at 09:17

## 2017-11-16 RX ADMIN — Medication 10 ML: at 16:55

## 2017-11-16 RX ADMIN — ENOXAPARIN SODIUM 40 MG: 40 INJECTION SUBCUTANEOUS at 11:35

## 2017-11-16 RX ADMIN — DOCUSATE SODIUM 100 MG: 100 CAPSULE, LIQUID FILLED ORAL at 16:55

## 2017-11-16 RX ADMIN — Medication 10 ML: at 21:22

## 2017-11-16 RX ADMIN — ENALAPRIL MALEATE 10 MG: 5 TABLET ORAL at 12:10

## 2017-11-16 RX ADMIN — SODIUM CHLORIDE 100 ML/HR: 900 INJECTION, SOLUTION INTRAVENOUS at 21:21

## 2017-11-16 RX ADMIN — METOPROLOL SUCCINATE 25 MG: 50 TABLET, EXTENDED RELEASE ORAL at 11:34

## 2017-11-16 RX ADMIN — MORPHINE SULFATE 2 MG: 2 INJECTION, SOLUTION INTRAMUSCULAR; INTRAVENOUS at 09:19

## 2017-11-16 RX ADMIN — ACETAMINOPHEN 650 MG: 325 TABLET ORAL at 19:45

## 2017-11-16 RX ADMIN — SODIUM CHLORIDE 100 ML/HR: 900 INJECTION, SOLUTION INTRAVENOUS at 12:14

## 2017-11-16 RX ADMIN — CEFTRIAXONE 1 G: 1 INJECTION, POWDER, FOR SOLUTION INTRAMUSCULAR; INTRAVENOUS at 10:17

## 2017-11-16 RX ADMIN — TAMSULOSIN HYDROCHLORIDE 0.4 MG: 0.4 CAPSULE ORAL at 11:35

## 2017-11-16 RX ADMIN — SODIUM CHLORIDE 1000 ML: 900 INJECTION, SOLUTION INTRAVENOUS at 09:16

## 2017-11-16 NOTE — ED NOTES
TRANSFER - OUT REPORT:    Verbal report given to Andie RN (name) on Damien Zepeda  being transferred to Oncology (unit) for routine progression of care       Report consisted of patients Situation, Background, Assessment and   Recommendations(SBAR). Information from the following report(s) SBAR, Kardex, ED Summary, Intake/Output, MAR, Recent Results and Med Rec Status was reviewed with the receiving nurse. Lines:   Peripheral IV 11/16/17 Right Antecubital (Active)   Site Assessment Clean, dry, & intact 11/16/2017  8:38 AM   Phlebitis Assessment 0 11/16/2017  8:38 AM   Infiltration Assessment 0 11/16/2017  8:38 AM   Dressing Status Clean, dry, & intact 11/16/2017  8:38 AM   Dressing Type 4 X 4;Transparent 11/16/2017  8:38 AM   Hub Color/Line Status Pink;Flushed 11/16/2017  8:38 AM   Alcohol Cap Used Yes 11/16/2017  8:38 AM        Opportunity for questions and clarification was provided.       Patient transported with:   [facility]

## 2017-11-16 NOTE — IP AVS SNAPSHOT
[address] [facility] 
[phone] Patient: Damien Zepeda MRN: [id number] YEA:[id number] You are allergic to the following Allergen Reactions Ciprofloxacin Swelling In hands and feet Other Medication Other (comments) Pt states cough medicine afters his prostrate Recent Documentation Height Weight BMI Smoking Status 1.702 m 114.4 kg 39.5 kg/m2 Former Smoker Unresulted Labs-Please follow up with your PCP about these lab tests Order Current Status CULTURE, BLOOD, PAIRED Preliminary result Emergency Contacts  (Rel.) Home Phone Work Phone Mobile Phone Jaspreet Vazquez (Child) [phone] -- -- About your hospitalization You were admitted on:  November 16, 2017 You last received care in the:  [address] You were discharged on:  November 18, 2017 Why you were hospitalized Your primary diagnosis was:  Uti (Urinary Tract Infection) Your diagnoses also included:  Sepsis (Hcc), Morbid Obesity (Hcc), Coronary Atherosclerosis Of Native Coronary Artery, Djd (Degenerative Joint Disease) Providers Seen During Your Hospitalization Provider Specialty Primary office phone Jordy Gallardo MD Emergency Medicine [phone] Jamey Pedro MD Internal Medicine [phone] Flaquito Wu MD Hospitalist [phone] Your Primary Care Physician (PCP) Primary Care Physician Office Phone Office Fax Kristopher Amy [phone] Follow-up Information Follow up With Details Comments Contact Info Alexa Bland MD Schedule an appointment as soon as possible for a visit to be seen within 1 week Shaun Trammell [facility] [address] [address] 
[phone] My Medications STOP taking these medications   
 amoxicillin 500 mg capsule Commonly known as:  AMOXIL  
   
  
 predniSONE 20 mg tablet Commonly known as:  DELTASONE  
   
  
  
TAKE these medications as instructed Instructions Each Dose to Equal  
 Morning Noon Evening Bedtime  
 acetaminophen 500 mg tablet Commonly known as:  TYLENOL Your last dose was: Your next dose is: Take 1,000 mg by mouth every six (6) hours as needed for Pain. 1000 mg  
    
   
   
   
  
 cefdinir 300 mg capsule Commonly known as:  OMNICEF Your last dose was: Your next dose is: Take 1 Cap by mouth two (2) times a day for 5 days. Indications: UTI  
 300 mg  
    
   
   
   
  
 enalapril 10 mg tablet Commonly known as:  Kassandra Credit Your last dose was: Your next dose is: Take 10 mg by mouth daily. 10 mg PROBIOTIC (S.BOULARDII) 250 mg capsule Generic drug:  Saccharomyces boulardii Your last dose was: Your next dose is: Take 1 Cap by mouth two (2) times a day. 250 mg  
    
   
   
   
  
 tamsulosin 0.4 mg capsule Commonly known as:  FLOMAX Your last dose was: Your next dose is: Take 0.4 mg by mouth daily. 0.4 mg  
    
   
   
   
  
 TOPROL XL 50 mg XL tablet Generic drug:  metoprolol succinate Your last dose was: Your next dose is: Take 25 mg by mouth daily. 25 mg  
    
   
   
   
  
 TYLENOL-CODEINE #3 300-30 mg per tablet Generic drug:  acetaminophen-codeine Your last dose was: Your next dose is: Take 1 Tab by mouth every six (6) hours as needed for Pain. 1 Tab Where to Get Your Medications Information on where to get these meds will be given to you by the nurse or doctor. ! Ask your nurse or doctor about these medications  
  cefdinir 300 mg capsule Discharge Instructions Patient Discharge Instructions Pt Name  Kenn Loza Date of Birth 10/3/1931 Age  80 y.o. Medical Record Number  [id number] PCP Holland Spann MD   
Admit date:  11/16/2017 @    [address] [address] Room Number  1123/01 Date of Discharge 11/18/2017 Admission Diagnoses:     UTI (urinary tract infection) Allergies Allergen Reactions  Ciprofloxacin Swelling In hands and feet  Other Medication Other (comments) Pt states cough medicine afters his prostrate You were admitted to [address] for  UTI (urinary tract infection) YOUR OTHER MEDICAL DIAGNOSES INCLUDE (BUT NOT LIMITED TO ): 
Present on Admission:  UTI (urinary tract infection)  Sepsis ([address]  Morbid obesity ([facility] [address]  Coronary atherosclerosis of native coronary artery-- RCA with L>R collaterals 1/2012  DJD (degenerative joint disease) DIET:  Cardiac Diet Recommended activity: Activity as tolerated Follow up : Follow-up Information Follow up With Details Comments Contact Info Holland Spann MD Schedule an appointment as soon as possible for a visit to be seen within 1 week Shaun Trammell [facility] [address] 
[phone] ** Please complete your antibiotic as recommended and follow up with your primary care doctor within one week · It is important that you take the medication exactly as they are prescribed. · Keep your medication in the bottles provided by the pharmacist and keep a list of the medication names, dosages, and times to be taken in your wallet. · Do not take other medications without consulting your doctor.   
 
 
ADDITIONAL INFORMATION: If you experience any of the following symptoms or have any health problem not listed below, then please call your primary care physician or return to the emergency room if you cannot get hold of your doctor: Fever, chills, nausea, vomiting, diarrhea, change in mentation, falling, bleeding, shortness of breath. I understand that if any problems occur once I am discharged, I am supposed to call my Primary care physician for further care or seek help in the Emergency Department at the nearest Healthcare facility. I have had an opportunity to discuss my clinical issues with my doctor and nursing staff. I understand and acknowledge receipt of the above instructions. Physician's or R.N.'s Signature                                                            Date/Time Patient or Representative Signature                                                 Date/Time Discharge Orders None [employer] Announcement We are excited to announce that we are making your provider's discharge notes available to you in [employer]. You will see these notes when they are completed and signed by the physician that discharged you from your recent hospital stay. If you have any questions or concerns about any information you see in [employer], please call the Health Information Department where you were seen or reach out to your Primary Care Provider for more information about your plan of care. Introducing [facility] [place] [facility]! [facility] introduces [employer] patient portal. Now you can access parts of your medical record, email your doctor's office, and request medication refills online. 1. In your internet browser, go to [email or web address] 2. Click on the First Time User? Click Here link in the Sign In box. You will see the New Member Sign Up page. 3. Enter your [employer] Access Code exactly as it appears below.  You will not need to use this code after youve completed the sign-up process. If you do not sign up before the expiration date, you must request a new code. · [employer] Access Code: [email or web address] Expires: 2/14/2018  7:59 AM 
 
4. Enter the last four digits of your Social Security Number (xxxx) and Date of Birth (mm/dd/yyyy) as indicated and click Submit. You will be taken to the next sign-up page. 5. Create a [employer] ID. This will be your [employer] login ID and cannot be changed, so think of one that is secure and easy to remember. 6. Create a [employer] password. You can change your password at any time. 7. Enter your Password Reset Question and Answer. This can be used at a later time if you forget your password. 8. Enter your e-mail address. You will receive e-mail notification when new information is available in [address] 9. Click Sign Up. You can now view and download portions of your medical record. 10. Click the Download Summary menu link to download a portable copy of your medical information. If you have questions, please visit the Frequently Asked Questions section of the [employer] website. Remember, [employer] is NOT to be used for urgent needs. For medical emergencies, dial 911. Now available from your iPhone and Android! General Information Please provide this summary of care documentation to your next provider. Patient Signature:  ____________________________________________________________ Date:  ____________________________________________________________  
  
Ralf Manjarrez Provider Signature:  ____________________________________________________________ Date:  ____________________________________________________________

## 2017-11-16 NOTE — PROGRESS NOTES
Problem: Falls - Risk of  Goal: *Absence of Falls  Document Magali Fall Risk and appropriate interventions in the flowsheet.   Outcome: Progressing Towards Goal  Fall Risk Interventions:

## 2017-11-16 NOTE — H&P
Hospitalist Admission Note    NAME: Zelalem Haynes   :  10/3/1931   MRN:  [id number]     Date/Time:  2017 10:43 AM    Patient PCP: Krupa Schneider MD  ______________________________________________________________________   Assessment & Plan:  UTI, POA  Sepsis (WBC 13K, tachycardia)  --failed outpatient amoxicillin x 3 days  --continue with IV rocephin. Follow urine culture. CT abdomen with non-obstructing left renal stones and mild BPH.  --check CXR since complained of SOB    BPH  --continue flomax. León placed in ER with output 100ml; will remove it tomorrow. Hyponatremia Na 1333  Mild renal insufficiency acute Cr 1.22, baseline 1  --likely dehydration. IVF    HTN  --continue vasotec for now but if Cr worsen, would hold  --continue toprol xl    Arthritis  --hold indomethacin prn for now  --recently finished 6 day course of prednisone for shoulder pain. He believes prednisone may be cause of his urinary difficulties. Body mass index is 39.5 kg/(m^2). Code: discussed, wants DNR/DNI  DVT prophylaxis: lovenox  Surrogate decision maker:  Bautista Campos        Subjective:   CHIEF COMPLAINT:  Urinary urgency, frequency, subjective fever, chills    HISTORY OF PRESENT ILLNESS:     Zelalem Haynes is a 80 y.o.  male who presents with 5 days of urinary urgency, difficulty passing water. Saw pcp 3 days ago and started on amoxicillin for uti, no significant improvement. This morning developed subjective fever and chills. Was on 6 day course of prednisone last week for shoulder pain and thinks that's when his urinary problem began. Loss of appetite due to altered taste. +nausea, no vomiting. SOB this AM.   No chest pain, cough. We were asked to admit for work up and evaluation of the above problems.      Past Medical History:   Diagnosis Date    Arthritis     CAD (coronary artery disease)     Chest pain, unspecified     Essential hypertension     Essential hypertension, benign     GERD (gastroesophageal reflux disease)     lower back    Hypertension     [facility] [address]     Mixed hyperlipidemia     Recurrent kidney stones       Past Surgical History:   Procedure Laterality Date    CHEST SURGERY PROCEDURE UNLISTED  9/3/13    left thoracoscopy; left upper lobectomy; left pericostal nerve block; lymph node dissection    HX APPENDECTOMY      HX HEART CATHETERIZATION      HX HEENT      tonsillectomy    HX ORTHOPAEDIC      back surgery x2 1977, right shoulder surgery    HX ORTHOPAEDIC      bilateral knee replacement    HX OTHER SURGICAL      neck or cancer removal     Social History   Substance Use Topics    Smoking status: Former Smoker     Packs/day: 1.00     Years: 20.00     Quit date: 1/12/1985    Smokeless tobacco: Never Used    Alcohol use No   Lives alone, independent ADLs    Family History   Problem Relation Age of Onset    Diabetes Brother     Heart Disease Brother      Allergies   Allergen Reactions    Ciprofloxacin Swelling     In hands and feet    Other Medication Other (comments)     Pt states cough medicine afters his prostrate        Prior to Admission medications    Medication Sig Start Date End Date Taking? Authorizing Provider   indomethacin (INDOCIN) 50 mg capsule Take 50 mg by mouth three (3) times daily as needed for Pain. Yes Phys Other, MD   amoxicillin (AMOXIL) 500 mg capsule Take 2 mg by mouth three (3) times daily. Yes Historical Provider   amoxicillin 500 mg tab Take 2 g by mouth as needed. Yes Historical Provider   metoprolol succinate (TOPROL XL) 50 mg XL tablet Take 25 mg by mouth daily. Yes Historical Provider   tamsulosin (FLOMAX) 0.4 mg capsule Take 0.4 mg by mouth daily. Yes Historical Provider   enalapril (VASOTEC) 10 mg tablet Take 10 mg by mouth daily. Yes Historical Provider   acetaminophen (TYLENOL) 500 mg tablet Take  by mouth every six (6) hours as needed.     Historical Provider     REVIEW OF SYSTEMS: POSITIVE= Bold. Negative = normal text  General:  fever, chills, sweats, generalized weakness, weight loss/gain, loss of appetite  Eyes:  blurred vision, eye pain, loss of vision, diplopia  Ear Nose and Throat:  rhinorrhea, pharyngitis  Respiratory:   cough, sputum production, SOB, wheezing, WOLF, pleuritic pain  Cardiology:  chest pain, palpitations, orthopnea, PND, edema, syncope   Gastrointestinal:  abdominal pain, N/V, dysphagia, diarrhea, constipation, bleeding  Genitourinary:  frequency, urgency, dysuria, hematuria, incontinence  Muskuloskeletal :  arthralgia, myalgia  Hematology:  easy bruising, bleeding, lymphadenopathy  Dermatological:  rash, ulceration, pruritis  Endocrine:  hot flashes or polydipsia  Neurological:  headache, dizziness, confusion, focal weakness, paresthesia, memory loss, gait disturbance  Psychological: anxiety, depression, agitation      Objective:   VITALS:    Visit Vitals    /77    Pulse 82    Temp 98.3 °F (36.8 °C)    Resp 19    Ht 5' 7\" (1.702 m)    Wt 114.4 kg (252 lb 3.3 oz)    SpO2 97%    BMI 39.5 kg/m2     Temp (24hrs), Av.3 °F (36.8 °C), Min:98.3 °F (36.8 °C), Max:98.3 °F (36.8 °C)    Body mass index is 39.5 kg/(m^2). PHYSICAL EXAM:    General:    Alert, obese, cooperative, no distress, appears stated age. HEENT: Atraumatic, anicteric sclerae, pink conjunctivae     No oral ulcers, mucosa dry, throat clear. Hearing intact. Neck:  Supple, symmetrical,  thyroid: non tender  Lungs:   Clear to auscultation bilaterally. No Wheezing or Rhonchi. No rales. Chest wall:  No tenderness  No Accessory muscle use. Heart:   Regular  rhythm,  No  murmur   No gallop. Trace edema. Abdomen:   Obese, Soft, non-tender. Not distended. Bowel sounds normal. No masses. León with dark urine, clear  Extremities: No cyanosis. No clubbing  Skin:     Not pale Not Jaundiced  Mild redness on upper back  Psych:  Good insight. Not depressed.   Not anxious or agitated. Neurologic: EOMs intact. No facial asymmetry. No aphasia or slurred speech. Symmetrical strength, Alert and oriented X 3. Peripheral pulse: Bilateral, Radial, 2+  Capillary refill:  normal    IMAGING RESULTS:   []       I have personally reviewed the actual   []     CXR  []     CT scan  CXR:  CT :  EKG: sinus tach 119, bifascicular block   ________________________________________________________________________  Care Plan discussed with:    Comments   Patient y    Family      RN     Care Manager                    Consultant:  lisa Triplett   ________________________________________________________________________  Prophylaxis:  GI none   DVT lovenox   ________________________________________________________________________  Recommended Disposition:   Home with Family y   HH/PT/OT/RN y   SNF/LTC    KEEGAN    ________________________________________________________________________  Code Status:  Full Code    DNR/DNI y   ________________________________________________________________________  TOTAL TIME:  50 minutes  ______________________________________________________________________  Marion Gauthier MD      Procedures: see electronic medical records for all procedures/Xrays and details which were not copied into this note but were reviewed prior to creation of Plan.     LAB DATA REVIEWED:    Recent Results (from the past 24 hour(s))   EKG, 12 LEAD, INITIAL    Collection Time: 11/16/17  7:46 AM   Result Value Ref Range    Ventricular Rate 119 BPM    Atrial Rate 119 BPM    P-R Interval 156 ms    QRS Duration 136 ms    Q-T Interval 346 ms    QTC Calculation (Bezet) 486 ms    Calculated P Axis 18 degrees    Calculated R Axis -85 degrees    Calculated T Axis 54 degrees    Diagnosis       Sinus tachycardia  Possible Left atrial enlargement  Right bundle branch block  Left anterior fascicular block  ** Bifascicular block **  Left ventricular hypertrophy with QRS widening and repolarization abnormality  Abnormal ECG  When compared with ECG of 16-OCT-2014 16:05,  Vent. rate has increased BY  45 BPM  QRS duration has decreased     CBC WITH AUTOMATED DIFF    Collection Time: 11/16/17  8:12 AM   Result Value Ref Range    WBC 13.1 (H) 4.1 - 11.1 K/uL    RBC 5.33 4.10 - 5.70 M/uL    HGB 16.4 12.1 - 17.0 g/dL    HCT 49.1 36.6 - 50.3 %    MCV 92.1 80.0 - 99.0 FL    MCH 30.8 26.0 - 34.0 PG    MCHC 33.4 30.0 - 36.5 g/dL    RDW 15.9 (H) 11.5 - 14.5 %    PLATELET 072 (L) 327 - 400 K/uL    NEUTROPHILS 81 %    LYMPHOCYTES 7 %    MONOCYTES 10 %    EOSINOPHILS 2 %    BASOPHILS 0 %    ABS. NEUTROPHILS 10.6 K/UL    ABS. LYMPHOCYTES 0.9 K/UL    ABS. MONOCYTES 1.3 K/UL    ABS. EOSINOPHILS 0.3 K/UL    ABS. BASOPHILS 0.0 K/UL    RBC COMMENTS NORMOCYTIC, NORMOCHROMIC      DF MANUAL     METABOLIC PANEL, COMPREHENSIVE    Collection Time: 11/16/17  8:12 AM   Result Value Ref Range    Sodium 133 (L) 136 - 145 mmol/L    Potassium 4.5 3.5 - 5.1 mmol/L    Chloride 98 97 - 108 mmol/L    CO2 25 21 - 32 mmol/L    Anion gap 10 5 - 15 mmol/L    Glucose 184 (H) 65 - 100 mg/dL    BUN 19 6 - 20 MG/DL    Creatinine 1.22 0.70 - 1.30 MG/DL    BUN/Creatinine ratio 16 12 - 20      GFR est AA >60 >60 ml/min/1.73m2    GFR est non-AA 56 (L) >60 ml/min/1.73m2    Calcium 8.9 8.5 - 10.1 MG/DL    Bilirubin, total 1.5 (H) 0.2 - 1.0 MG/DL    ALT (SGPT) 37 12 - 78 U/L    AST (SGOT) 24 15 - 37 U/L    Alk.  phosphatase 70 45 - 117 U/L    Protein, total 7.8 6.4 - 8.2 g/dL    Albumin 2.9 (L) 3.5 - 5.0 g/dL    Globulin 4.9 (H) 2.0 - 4.0 g/dL    A-G Ratio 0.6 (L) 1.1 - 2.2     LIPASE    Collection Time: 11/16/17  8:12 AM   Result Value Ref Range    Lipase 165 73 - 393 U/L   LACTIC ACID    Collection Time: 11/16/17  8:12 AM   Result Value Ref Range    Lactic acid 2.0 0.4 - 2.0 MMOL/L   TROPONIN I    Collection Time: 11/16/17  8:12 AM   Result Value Ref Range    Troponin-I, Qt. <0.04 <0.05 ng/mL   MAGNESIUM    Collection Time: 11/16/17  8:12 AM   Result Value Ref Range    Magnesium 1.9 1.6 - 2.4 mg/dL   URINALYSIS W/ REFLEX CULTURE    Collection Time: 11/16/17  8:33 AM   Result Value Ref Range    Color DARK YELLOW      Appearance CLOUDY (A) CLEAR      Specific gravity 1.021 1.003 - 1.030      pH (UA) 6.0 5.0 - 8.0      Protein 100 (A) NEG mg/dL    Glucose NEGATIVE  NEG mg/dL    Ketone TRACE (A) NEG mg/dL    Blood LARGE (A) NEG      Urobilinogen 1.0 0.2 - 1.0 EU/dL    Nitrites POSITIVE (A) NEG      Leukocyte Esterase MODERATE (A) NEG      WBC  0 - 4 /hpf    RBC 5-10 0 - 5 /hpf    Epithelial cells FEW FEW /lpf    Bacteria 4+ (A) NEG /hpf    UA:UC IF INDICATED URINE CULTURE ORDERED (A) CNI      Mucus 1+ (A) NEG /lpf   BILIRUBIN, CONFIRM    Collection Time: 11/16/17  8:33 AM   Result Value Ref Range    Bilirubin UA, confirm NEGATIVE  NEG

## 2017-11-16 NOTE — PROGRESS NOTES
Pharmacy Clarification of the Prior to Admission Medication Regimen Retrospective to the Admission Medication Reconciliation    The patient was interviewed regarding clarification of the prior to admission medication regimen, and was questioned regarding use of any other inhalers, topical products, over the counter medications, herbal medications, vitamin products or ophthalmic/nasal/otic medication use. Information Obtained From: Patient, RX Query    Recommendations/Findings: The following amendments were made to the patient's active medication list on file at [facility]:     1) Additions:    predniSONE (DELTASONE) 20 mg tablet   acetaminophen-codeine (TYLENOL-CODEINE #3) 300-30 mg per tablet    2) Removals:    Indomethacin 50 mg cap    3) Changes:   acetaminophen (TYLENOL) 500 mg tablet (Old regimen: take by mouth Q6H PRN /New regimen: 1,000 mg Q6H PRN)   amoxicillin (AMOXIL) 500 mg capsule (Old regimen: 2 mg TID /New regimen: 500 mg TID)    4) Pertinent Pharmacy Findings:   Updated patients preferred outpatient pharmacy to: [id number] [id number] [place] [address]   amoxicillin (AMOXIL) 500 mg capsule: Patient started a 10 day regimen on 11/14/2017. Patient has completed 2 days of therapy as of 11/16/2017.  predniSONE (DELTASONE) 20 mg tablet: Patient stated that he started this agent on 11/9/2017. Patient stated that he stopped taking this agent on 11/12/2017, because he was 'holding too much fluid'. Patient stated that his doctor instructed him to stop this agent as of 11/14/2017.  amoxicillin (AMOXIL) 500 mg capsule: Patient stated that he has this agent at home, but is not currently taking. Patient stated that he will take this agent prior to a dental procedure. PTA medication list was corrected to the following:     Prior to Admission Medications   Prescriptions Last Dose Informant Patient Reported?  Taking?   acetaminophen (TYLENOL) 500 mg tablet 11/15/2017 at Unknown time Self Yes Yes   Sig: Take 1,000 mg by mouth every six (6) hours as needed for Pain. acetaminophen-codeine (TYLENOL-CODEINE #3) 300-30 mg per tablet 11/13/2017 at Unknown time Self Yes Yes   Sig: Take 1 Tab by mouth every six (6) hours as needed for Pain.   amoxicillin (AMOXIL) 500 mg capsule 11/15/2017 at Unknown time Self Yes Yes   Sig: Take 500 mg by mouth three (3) times daily. amoxicillin (AMOXIL) 500 mg capsule Not Taking at Unknown time Self Yes No   Sig: Take 2,000 mg by mouth daily as needed. Patient takes this agent prior to dental procedure. enalapril (VASOTEC) 10 mg tablet 11/15/2017 at Unknown time Self Yes Yes   Sig: Take 10 mg by mouth daily. metoprolol succinate (TOPROL XL) 50 mg XL tablet 11/15/2017 at Unknown time Self Yes Yes   Sig: Take 25 mg by mouth daily. predniSONE (DELTASONE) 20 mg tablet 11/12/2017 at Unknown  Self Yes No   Sig: Take 60 mg by mouth daily (with breakfast). tamsulosin (FLOMAX) 0.4 mg capsule 11/15/2017 at Unknown time Self Yes Yes   Sig: Take 0.4 mg by mouth daily.       Facility-Administered Medications: None          Thank you,  Gunner Woodard CPhT  Medication History Pharmacy Technician

## 2017-11-16 NOTE — PROGRESS NOTES
Patient arrived to the unit via stretcher. Patient is A&O times 4, denies pain at the present time. Patient has a cole which was placed in the ED for retention. Call bell in reach.     Primary Nurse Amada Kenny RN and Laz Aguilar RN performed a dual skin assessment on this patient No impairment noted  Mamadou score is 18

## 2017-11-16 NOTE — PROGRESS NOTES
TRANSFER - IN REPORT:    Verbal report received from Naila(name) on Rafita Rajan  being received from ED(unit) for routine progression of care      Report consisted of patients Situation, Background, Assessment and   Recommendations(SBAR). Information from the following report(s) SBAR, Kardex, Intake/Output, MAR and Recent Results was reviewed with the receiving nurse. Opportunity for questions and clarification was provided. Assessment completed upon patients arrival to unit and care assumed.

## 2017-11-16 NOTE — ED PROVIDER NOTES
[facility].  EMERGENCY DEPARTMENT HISTORY AND PHYSICAL EXAM       Date of Service: 11/16/2017   Patient Name: Anita Srivastava   YOB: 1931  Medical Record Number: [id number]    History of Presenting Illness     Chief Complaint   Patient presents with    Urinary Frequency     \"I keep having to go and go and can't get it all out\" x 4 days    Shortness of Breath     since last night        History Provided By:  patient    Additional History:   Anita Srivastava is a 80 y.o. male with PMhx significant for HTN, arthritis, GERD, CAD, and kidney stones, who presents ambulatory to the ED with cc of a persistent urinary difficulty x 3 days. He notes associated symptoms of urinary urgency, lower abdominal pain radiating to his back, and chills. He notes a h/o kidney stones, but reports the sxs are not similar to his prior. Pt states he had last urinated \"a spoonful\" at 7:05 AM today. Pt was seen by his PCP x 1 week ago for his shoulder pain (dx with arthritis) and Rx'd prednisone, for which he had taken until x 4 days ago. He had no urinary issues before prednisone use, but notes the urinary difficulty began shortly after prednisone use. His last prednisone dosage was taken x 4 nights ago. He noted he was able to urinate x 3 days ago, but noted it was \"not much. \" That evening, it had worsened, where he needed to initiate a strain. He additionally reports he had a fever and was dx with a UTI x 2 days ago and placed on Ciprofloxacin. Pt has a h/o an enlarged prostate for which he is currently on flomax for. He reports a h/o cardiac issues, stating \"the lower part of my heart goes out\" and is placed on 25 mg coreg. Pt has a PSHx of an appendectomy. Pt denies a h/o diverticulitis or DM. He denies a fever. Social Hx: - (former) Tobacco, - EtOH, - Illicit Drugs    There are no other complaints, changes or physical findings at this time.     Primary Care Provider: Hodan Toledo MD   Specialist:   Cardiology: Dr. Caresse Severin    Past History     Past Medical History:   Past Medical History:   Diagnosis Date    Arthritis     CAD (coronary artery disease)     Chest pain, unspecified     Essential hypertension     Essential hypertension, benign     GERD (gastroesophageal reflux disease)     lower back    Hypertension     [facility] cancer ([facility] [address]     Mixed hyperlipidemia     Recurrent kidney stones         Past Surgical History:   Past Surgical History:   Procedure Laterality Date    CHEST SURGERY PROCEDURE UNLISTED  9/3/13    left thoracoscopy; left upper lobectomy; left pericostal nerve block; lymph node dissection    HX APPENDECTOMY      HX HEART CATHETERIZATION      HX HEENT      tonsillectomy    HX ORTHOPAEDIC      back surgery x2 1977, right shoulder surgery    HX ORTHOPAEDIC      bilateral knee replacement    HX OTHER SURGICAL      neck or cancer removal        Family History:   Family History   Problem Relation Age of Onset    Diabetes Brother     Heart Disease Brother         Social History:   Social History   Substance Use Topics    Smoking status: Former Smoker     Packs/day: 1.00     Years: 20.00     Quit date: 1/12/1985    Smokeless tobacco: Never Used    Alcohol use No        Allergies: Allergies   Allergen Reactions    Other Medication Other (comments)     Pt states cough medicine afters his prostrate         Review of Systems   Review of Systems   Constitutional: Positive for chills. Negative for fever. HENT: Negative. Negative for congestion, rhinorrhea and sinus pressure. Eyes: Negative. Negative for discharge and redness. Respiratory: Negative. Negative for chest tightness and shortness of breath. Cardiovascular: Negative. Negative for chest pain. Gastrointestinal: Positive for abdominal pain (+lower). Negative for blood in stool. Endocrine: Negative. Genitourinary: Positive for difficulty urinating and urgency.  Negative for flank pain and hematuria. Musculoskeletal: Positive for back pain. Skin: Negative. Negative for rash. Neurological: Negative. Negative for dizziness, seizures, weakness, numbness and headaches. Hematological: Negative. All other systems reviewed and are negative. Physical Exam  Physical Exam   Constitutional: He is oriented to person, place, and time. He appears well-developed and well-nourished. No distress. HENT:   Head: Normocephalic and atraumatic. Nose: Nose normal.   Mouth/Throat: No oropharyngeal exudate. Eyes: Conjunctivae and EOM are normal. Pupils are equal, round, and reactive to light. No scleral icterus. Neck: Normal range of motion. Neck supple. No JVD present. No thyromegaly present. Cardiovascular: Regular rhythm, normal heart sounds, intact distal pulses and normal pulses. Tachycardia present. PMI is not displaced. Exam reveals no gallop and no friction rub. No murmur heard. Pulmonary/Chest: Effort normal and breath sounds normal. No stridor. No respiratory distress. He has no decreased breath sounds. He has no wheezes. He has no rhonchi. He has no rales. He exhibits no tenderness. Abdominal: Soft. Normal aorta and bowel sounds are normal. He exhibits distension. He exhibits no abdominal bruit, no pulsatile midline mass and no mass. There is no hepatosplenomegaly. There is tenderness in the suprapubic area and left lower quadrant. There is no rigidity, no rebound, no guarding, no CVA tenderness, no tenderness at McBurney's point and negative Hinojosa's sign. No hernia. Neurological: He is alert and oriented to person, place, and time. He has normal reflexes. He displays no atrophy and no tremor. No cranial nerve deficit or sensory deficit. He exhibits normal muscle tone. He displays no seizure activity. Coordination normal. GCS eye subscore is 4. GCS verbal subscore is 5. GCS motor subscore is 6.    Reflex Scores:       Patellar reflexes are 2+ on the right side and 2+ on the left side. Skin: Skin is warm. No rash noted. He is not diaphoretic. No cyanosis or erythema. No pallor. Nursing note and vitals reviewed. Medical Decision Making   I am the first provider for this patient. I reviewed the vital signs, available nursing notes, past medical history, past surgical history, family history and social history. Old Medical Records: Old medical records. Nursing notes. Provider Notes:   DDx: Urinary retention, complicated UTI, renal colic, BPH, malignancy, diverticulitis    Impression/Plan: H/o enlarged prostate, HTN, to the ER complaining of urinary frequency with decreased urine output since Monday. Seen by his PCP a week before for shoulder pain and placed on prednisone and since being on that felt that his urinary sxs started. Review of records show he had E. Coli UTI. He was placed on amoxicillin in his PCP office. He does complain of chills but no obvious fever. He does have lower abdominal discomfort. Will check labs, possible CT, and make final dispo depending on those results. ED Course:  7:47 AM   Initial assessment performed. The patients presenting problems have been discussed, and they are in agreement with the care plan formulated and outlined with them. I have encouraged them to ask questions as they arise throughout their visit. Progress Notes:   PROGRESS NOTE:  9:09 AM  Pt has been re-evaluated. León catheter was placed and 100 cc urine output. Pt is still having discomfort, will plan to order CT. CONSULT NOTE:   9:50 AM  Indy Mcdermott MD spoke with Dr. Trent Colunga,   Specialty: Hospitalist  Discussed pt's hx, disposition, and available diagnostic and imaging results. Reviewed care plans. Consultant will evaluate pt for admission.   Written by [place] [facility] Scribkrys, as dictated by Indy Mcdermott MD.      Diagnostic Study Results   Labs -  Recent Results (from the past 12 hour(s))   EKG, 12 LEAD, INITIAL    Collection Time: 11/16/17 7:46 AM   Result Value Ref Range    Ventricular Rate 119 BPM    Atrial Rate 119 BPM    P-R Interval 156 ms    QRS Duration 136 ms    Q-T Interval 346 ms    QTC Calculation (Bezet) 486 ms    Calculated P Axis 18 degrees    Calculated R Axis -85 degrees    Calculated T Axis 54 degrees    Diagnosis       Sinus tachycardia  Possible Left atrial enlargement  Right bundle branch block  Left anterior fascicular block  ** Bifascicular block **  Left ventricular hypertrophy with QRS widening and repolarization abnormality  Abnormal ECG  When compared with ECG of 16-OCT-2014 16:05,  Vent. rate has increased BY  45 BPM  QRS duration has decreased     CBC WITH AUTOMATED DIFF    Collection Time: 11/16/17  8:12 AM   Result Value Ref Range    WBC 13.1 (H) 4.1 - 11.1 K/uL    RBC 5.33 4.10 - 5.70 M/uL    HGB 16.4 12.1 - 17.0 g/dL    HCT 49.1 36.6 - 50.3 %    MCV 92.1 80.0 - 99.0 FL    MCH 30.8 26.0 - 34.0 PG    MCHC 33.4 30.0 - 36.5 g/dL    RDW 15.9 (H) 11.5 - 14.5 %    PLATELET 694 (L) 908 - 400 K/uL    NEUTROPHILS 81 %    LYMPHOCYTES 7 %    MONOCYTES 10 %    EOSINOPHILS 2 %    BASOPHILS 0 %    ABS. NEUTROPHILS 10.6 K/UL    ABS. LYMPHOCYTES 0.9 K/UL    ABS. MONOCYTES 1.3 K/UL    ABS. EOSINOPHILS 0.3 K/UL    ABS. BASOPHILS 0.0 K/UL    RBC COMMENTS NORMOCYTIC, NORMOCHROMIC      DF MANUAL     METABOLIC PANEL, COMPREHENSIVE    Collection Time: 11/16/17  8:12 AM   Result Value Ref Range    Sodium 133 (L) 136 - 145 mmol/L    Potassium 4.5 3.5 - 5.1 mmol/L    Chloride 98 97 - 108 mmol/L    CO2 25 21 - 32 mmol/L    Anion gap 10 5 - 15 mmol/L    Glucose 184 (H) 65 - 100 mg/dL    BUN 19 6 - 20 MG/DL    Creatinine 1.22 0.70 - 1.30 MG/DL    BUN/Creatinine ratio 16 12 - 20      GFR est AA >60 >60 ml/min/1.73m2    GFR est non-AA 56 (L) >60 ml/min/1.73m2    Calcium 8.9 8.5 - 10.1 MG/DL    Bilirubin, total 1.5 (H) 0.2 - 1.0 MG/DL    ALT (SGPT) 37 12 - 78 U/L    AST (SGOT) 24 15 - 37 U/L    Alk.  phosphatase 70 45 - 117 U/L    Protein, total 7.8 6.4 - 8.2 g/dL    Albumin 2.9 (L) 3.5 - 5.0 g/dL    Globulin 4.9 (H) 2.0 - 4.0 g/dL    A-G Ratio 0.6 (L) 1.1 - 2.2     LIPASE    Collection Time: 11/16/17  8:12 AM   Result Value Ref Range    Lipase 165 73 - 393 U/L   LACTIC ACID    Collection Time: 11/16/17  8:12 AM   Result Value Ref Range    Lactic acid 2.0 0.4 - 2.0 MMOL/L   TROPONIN I    Collection Time: 11/16/17  8:12 AM   Result Value Ref Range    Troponin-I, Qt. <0.04 <0.05 ng/mL   MAGNESIUM    Collection Time: 11/16/17  8:12 AM   Result Value Ref Range    Magnesium 1.9 1.6 - 2.4 mg/dL   URINALYSIS W/ REFLEX CULTURE    Collection Time: 11/16/17  8:33 AM   Result Value Ref Range    Color DARK YELLOW      Appearance CLOUDY (A) CLEAR      Specific gravity 1.021 1.003 - 1.030      pH (UA) 6.0 5.0 - 8.0      Protein 100 (A) NEG mg/dL    Glucose NEGATIVE  NEG mg/dL    Ketone TRACE (A) NEG mg/dL    Blood LARGE (A) NEG      Urobilinogen 1.0 0.2 - 1.0 EU/dL    Nitrites POSITIVE (A) NEG      Leukocyte Esterase MODERATE (A) NEG      WBC  0 - 4 /hpf    RBC 5-10 0 - 5 /hpf    Epithelial cells FEW FEW /lpf    Bacteria 4+ (A) NEG /hpf    UA:UC IF INDICATED URINE CULTURE ORDERED (A) CNI      Mucus 1+ (A) NEG /lpf   BILIRUBIN, CONFIRM    Collection Time: 11/16/17  8:33 AM   Result Value Ref Range    Bilirubin UA, confirm NEGATIVE  NEG         Radiologic Studies -  The following have been ordered and reviewed:    CT Results  (Last 48 hours)               11/16/17 0928  CT ABD PELV WO CONT Final result    Impression:  IMPRESSION:       1. Diverticulosis. No evidence of acute diverticulitis   2. Unchanged bilateral common iliac artery aneurysms   3. Nonobstructing left renal stones           Narrative:  EXAM:  CT ABD PELV WO CONT       INDICATION: LLQ suprapubic pain, dehydration       COMPARISON: June 1, 2017       CONTRAST:  None. TECHNIQUE:    Thin axial images were obtained through the abdomen and pelvis. Coronal and   sagittal reconstructions were generated. Oral contrast was not administered. CT   dose reduction was achieved through use of a standardized protocol tailored for   this examination and automatic exposure control for dose modulation. The absence of intravenous contrast material reduces the sensitivity for   evaluation of the solid parenchymal organs of the abdomen. FINDINGS:    LUNG BASES: Clear. Left hemidiaphragm is elevated. INCIDENTALLY IMAGED HEART AND MEDIASTINUM: Unremarkable. LIVER: No mass or biliary dilatation. GALLBLADDER: Unremarkable. SPLEEN: No mass. PANCREAS: No mass or ductal dilatation. ADRENALS: Unremarkable. 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 There are multiple punctate stones   within the left kidney. No hydronephrosis. STOMACH: Unremarkable. SMALL BOWEL: No dilatation or wall thickening. COLON: There is diverticulosis of the left colon. No evidence of acute   diverticulitis   APPENDIX: Surgically absent   PERITONEUM: No ascites or pneumoperitoneum. RETROPERITONEUM: No pathologic adenopathy. The abdominal aorta is calcified. There is aneurysmal dilatation of the common iliac arteries bilaterally. Right   common iliac artery measures 3.2 cm. Left common iliac artery measures 2.2 cm. There are linear calcifications within the lumen of both vessels. On previous   study these were present and may represent chronic dissections. No dissection of   the abdominal aorta is demonstrated. Given differences in measuring technique   these are unchanged. REPRODUCTIVE ORGANS: Prostate is mildly enlarged   URINARY BLADDER: Bladder is decompressed by a León   BONES: No destructive bone lesion. ADDITIONAL COMMENTS: N/A                 Vital Signs-Reviewed the patient's vital signs.    Patient Vitals for the past 12 hrs:   Temp Pulse Resp BP SpO2   11/16/17 0900 - (!) 102 18 142/88 93 %   11/16/17 0753 98.3 °F (36.8 °C) (!) 122 18 147/90 95 %       Medications Given in the ED:  Medications   sodium chloride (NS) flush 5-10 mL (not administered)   sodium chloride (NS) flush 5-10 mL (not administered)   cefTRIAXone (ROCEPHIN) 1 g in 0.9% sodium chloride (MBP/ADV) 50 mL (not administered)   morphine injection 2 mg (2 mg IntraVENous Given 11/16/17 0919)   ondansetron (ZOFRAN) injection 4 mg (4 mg IntraVENous Given 11/16/17 0917)   sodium chloride 0.9 % bolus infusion 1,000 mL (1,000 mL IntraVENous New Bag 11/16/17 0916)       Pulse Oximetry Analysis - Normal 96% on RA     Cardiac Monitor:   Rate: 124  Rhythm: Sinus Tachycardia     EKG interpretation: (Preliminary): 0746  Rhythm: sinus tachycardia and RBBB; and regular . Rate (approx.): 119; Axis: normal; NC interval: normal; QRS interval: prolonged; ST/T wave: normal; Other findings: left anterior fascicular block; no change 10/14. Written by Sandra Aguilar, ED Scribe, as dictated by Michelle Gutierrez MD.    Diagnosis   Clinical Impression:   1. Complicated UTI (urinary tract infection)         PLAN:  1. Admit    ADMIT NOTE:  9:50 AM  Patient is being admitted to the hospital by Dr. Cody Green. The results of their tests and reasons for their admission have been discussed with them and/or available family. They convey agreement and understanding for the need to be admitted and for their admission diagnosis. Consultation has been made with the inpatient physician specialist for hospitalization. _______________________________   Attestations: This note is prepared by Sandra Aguilar, acting as Scribe for Michelle Gutierrez MD.    Michelle Gutierrez MD: The scribe's documentation has been prepared under my direction and personally reviewed by me in its entirety.  I confirm that the note above accurately reflects all work, treatment, procedures, and medical decision making performed by me.    _______________________________

## 2017-11-16 NOTE — ED NOTES
Assumed care of patient. Patient is alert and oriented, does not appear to be in distress.  Patient ambulatory to ED with c/o sob and urinary retention since early this am.

## 2017-11-17 ENCOUNTER — APPOINTMENT (OUTPATIENT)
Dept: GENERAL RADIOLOGY | Age: 82
DRG: 872 | End: 2017-11-17
Attending: HOSPITALIST
Payer: MEDICARE

## 2017-11-17 PROCEDURE — 71020 XR CHEST PA LAT: CPT

## 2017-11-17 PROCEDURE — 74011250637 HC RX REV CODE- 250/637: Performed by: HOSPITALIST

## 2017-11-17 PROCEDURE — 65270000015 HC RM PRIVATE ONCOLOGY

## 2017-11-17 PROCEDURE — 74011250636 HC RX REV CODE- 250/636: Performed by: HOSPITALIST

## 2017-11-17 PROCEDURE — G8979 MOBILITY GOAL STATUS: HCPCS | Performed by: PHYSICAL THERAPIST

## 2017-11-17 PROCEDURE — 97161 PT EVAL LOW COMPLEX 20 MIN: CPT | Performed by: PHYSICAL THERAPIST

## 2017-11-17 PROCEDURE — G8978 MOBILITY CURRENT STATUS: HCPCS | Performed by: PHYSICAL THERAPIST

## 2017-11-17 PROCEDURE — 74011000258 HC RX REV CODE- 258: Performed by: HOSPITALIST

## 2017-11-17 RX ADMIN — Medication 10 ML: at 05:17

## 2017-11-17 RX ADMIN — TAMSULOSIN HYDROCHLORIDE 0.4 MG: 0.4 CAPSULE ORAL at 09:36

## 2017-11-17 RX ADMIN — SODIUM CHLORIDE 100 ML/HR: 900 INJECTION, SOLUTION INTRAVENOUS at 19:35

## 2017-11-17 RX ADMIN — Medication 10 ML: at 23:19

## 2017-11-17 RX ADMIN — DOCUSATE SODIUM 100 MG: 100 CAPSULE, LIQUID FILLED ORAL at 17:57

## 2017-11-17 RX ADMIN — DOCUSATE SODIUM 100 MG: 100 CAPSULE, LIQUID FILLED ORAL at 09:36

## 2017-11-17 RX ADMIN — CEFTRIAXONE 1 G: 1 INJECTION, POWDER, FOR SOLUTION INTRAMUSCULAR; INTRAVENOUS at 09:41

## 2017-11-17 RX ADMIN — Medication 10 ML: at 14:44

## 2017-11-17 RX ADMIN — ENALAPRIL MALEATE 10 MG: 5 TABLET ORAL at 09:35

## 2017-11-17 RX ADMIN — METOPROLOL SUCCINATE 25 MG: 50 TABLET, EXTENDED RELEASE ORAL at 09:36

## 2017-11-17 RX ADMIN — SODIUM CHLORIDE 100 ML/HR: 900 INJECTION, SOLUTION INTRAVENOUS at 05:17

## 2017-11-17 RX ADMIN — ENOXAPARIN SODIUM 40 MG: 40 INJECTION SUBCUTANEOUS at 11:15

## 2017-11-17 NOTE — PROGRESS NOTES
Problem: Mobility Impaired (Adult and Pediatric)  Goal: *Acute Goals and Plan of Care (Insert Text)  Physical Therapy Goals  Initiated 11/17/2017  1. Patient will move from supine to sit and sit to supine  in bed with independence within 7 day(s). 2.  Patient will transfer from bed to chair and chair to bed with independence using the least restrictive device within 7 day(s). 3.  Patient will perform sit to stand with independence within 7 day(s). 4.  Patient will ambulate with supervision/set-up for 250 feet with the least restrictive device within 7 day(s). physical Therapy EVALUATION  Patient: Yulissa Melgar (24 y.o. male)  Date: 11/17/2017  Primary Diagnosis: Sepsis ([facility] [address]  UTI (urinary tract infection)        Precautions: standard      ASSESSMENT :  Based on the objective data described below, the patient presents with decreased activity tolerance, slightly decreased functional mobility skills. Patient up on couch upon arrival.  Sit to stand with supervision. Ambulated 200' with CGA and no assistive device. Patient steady ambulating with no LOB, with wide TAYLOR. Patient slightly SOB after ambulating but states this is typical.  Patient is likely close to baseline for mobility skills. Lives alone in one story home but family looks in on him often. Do not anticipate further PT needs at discharge. Patient will benefit from skilled intervention to address the above impairments.   Patients rehabilitation potential is considered to be Good  Factors which may influence rehabilitation potential include:   [x]         None noted  []         Mental ability/status  []         Medical condition  []         Home/family situation and support systems  []         Safety awareness  []         Pain tolerance/management  []         Other:      PLAN :  Recommendations and Planned Interventions:  []           Bed Mobility Training             []    Neuromuscular Re-Education  [x]           Transfer Training []    Orthotic/Prosthetic Training  [x]           Gait Training                         []    Modalities  [x]           Therapeutic Exercises           []    Edema Management/Control  [x]           Therapeutic Activities            []    Patient and Family Training/Education  []           Other (comment):    Frequency/Duration: Patient will be followed by physical therapy  2 times a week to address goals. Discharge Recommendations: None  Further Equipment Recommendations for Discharge: none     SUBJECTIVE:   Patient stated I do okay.     OBJECTIVE DATA SUMMARY:   HISTORY:    Past Medical History:   Diagnosis Date    Arthritis     CAD (coronary artery disease)     Chest pain, unspecified     Essential hypertension     Essential hypertension, benign     GERD (gastroesophageal reflux disease)     lower back    Hypertension     Lung cancer ([facility] [address]     Mixed hyperlipidemia     Recurrent kidney stones      Past Surgical History:   Procedure Laterality Date    CHEST SURGERY PROCEDURE UNLISTED  9/3/13    left thoracoscopy; left upper lobectomy; left pericostal nerve block; lymph node dissection    HX APPENDECTOMY      HX HEART CATHETERIZATION      HX HEENT      tonsillectomy    HX ORTHOPAEDIC      back surgery x2 1977, right shoulder surgery    HX ORTHOPAEDIC      bilateral knee replacement    HX OTHER SURGICAL      neck or cancer removal     Prior Level of Function/Home Situation: Patient was independent with all mobility with no assistive device.   Personal factors and/or comorbidities impacting plan of care: None noted    Home Situation  Home Environment: Private residence  # Steps to Enter: 0  One/Two Story Residence: Two story, live on 1st floor  Living Alone: No  Support Systems: Child(angela), Family member(s), Friends \ neighbors  Patient Expects to be Discharged to[de-identified] Private residence  Current DME Used/Available at Home: Vanessa Dave, straight, Walker, rolling    EXAMINATION/PRESENTATION/DECISION MAKING:   Critical Behavior:  Neurologic State: Alert  Orientation Level: Oriented X4  Cognition: Appropriate decision making  Safety/Judgement: Awareness of environment  Hearing: Auditory  Auditory Impairment: Hard of hearing, bilateral  Skin:  intact  Edema: LE's minor  Range Of Motion:  AROM: Generally decreased, functional           PROM: Within functional limits           Strength:    Strength: Generally decreased, functional                    Tone & Sensation:   Tone: Normal              Sensation: Intact               Coordination:  Coordination: Within functional limits  Vision:      Functional Mobility:  Bed Mobility:   Up on couch upon arrival           Transfers:  Sit to Stand: Supervision  Stand to Sit: Supervision        Bed to Chair: Supervision              Balance:   Sitting: Intact  Standing: Intact  Ambulation/Gait Training:  Distance (ft): 200 Feet (ft)  Assistive Device: Gait belt  Ambulation - Level of Assistance: Contact guard assistance        Gait Abnormalities: Decreased step clearance        Base of Support: Widened     Speed/Brenda: Pace decreased (<100 feet/min)  Step Length: Right shortened;Left shortened                                    Functional Measure:    Elder Mobility Scale    17/20         EMS and G-code impairment scale:  Percentage of impairment CH  0% CI  1-19% CJ  20-39% CK  40-59% CL  60-79% CM  80-99% CN  100%   EMS Score 0-20 20 17-19 13-16 9-12 5-8 1-4 0      Scores under 10  generally these patients are dependent in mobility maneuvers; require help with  basic ADL, such as transfers, toileting and dressing. Scores between 10  13  generally these patients are borderline in terms of safe mobility and  independence in ADL i.e. they require some help with some mobility maneuvers. Scores over 14  Generally these patients are able to perform mobility maneuvers alone and safely  and are independent in basic ADL. G codes:   In compliance with CMSs Claims Based Outcome Reporting, the following G-code set was chosen for this patient based on their primary functional limitation being treated: The outcome measure chosen to determine the severity of the functional limitation was the Elderly Mobility scale with a score of 17/20 which was correlated with the impairment scale. ? Mobility - Walking and Moving Around:     - CURRENT STATUS: CI - 1%-19% impaired, limited or restricted    - GOAL STATUS: CH - 0% impaired, limited or restricted    - D/C STATUS:  ---------------To be determined---------------      Physical Therapy Evaluation Charge Determination   History Examination Presentation Decision-Making   MEDIUM  Complexity : 1-2 comorbidities / personal factors will impact the outcome/ POC  LOW Complexity : 1-2 Standardized tests and measures addressing body structure, function, activity limitation and / or participation in recreation  LOW Complexity : Stable, uncomplicated  LOW Complexity : FOTO score of       Based on the above components, the patient evaluation is determined to be of the following complexity level: LOW     Pain:       none reported              Activity Tolerance:   good  Please refer to the flowsheet for vital signs taken during this treatment. After treatment:   [x]         Patient left in no apparent distress sitting up in chair  []         Patient left in no apparent distress in bed  [x]         Call bell left within reach  [x]         Nursing notified  [x]         Caregiver present  []         Bed alarm activated    COMMUNICATION/EDUCATION:   The patients plan of care was discussed with: Registered Nurse.  []         Fall prevention education was provided and the patient/caregiver indicated understanding. []         Patient/family have participated as able in goal setting and plan of care. []         Patient/family agree to work toward stated goals and plan of care.   []         Patient understands intent and goals of therapy, but is neutral about his/her participation. []         Patient is unable to participate in goal setting and plan of care.     Thank you for this referral.  Hyacinth Chandra, PT   Time Calculation: 14 mins

## 2017-11-17 NOTE — PROGRESS NOTES
Oncology Nursing Communication Tool  7:36 AM  11/17/2017     Bedside shift change report given to Leslye Monae RN (incoming nurse) by Jules Farah (outgoing nurse) on [place] [address]. Report included the following information SBAR, Kardex, Intake/Output, MAR and Recent Results. Significant changes during shift: none      Issues for physician to address: discharge         Oncology Shift Note   Admission Date 11/16/2017   Admission Diagnosis Sepsis ([address]  UTI (urinary tract infection)   Code Status DNR   Consults None      Cardiac Monitoring [] Yes [x] No      Purposeful Hourly Rounding [x] Yes    Magali Score Total Score: 0   Magali score 3 or > [] Bed Alarm [] Avasys [] 1:1 sitter [] Patient refused (Place signed refusal form in chart)      Pain Managed [x] Yes [] No    Key Pain Meds             acetaminophen-codeine (TYLENOL-CODEINE #3) 300-30 mg per tablet  (Taking) Take 1 Tab by mouth every six (6) hours as needed for Pain. acetaminophen (TYLENOL) 500 mg tablet  (Taking) Take 1,000 mg by mouth every six (6) hours as needed for Pain. Influenza Vaccine Received Flu Vaccine for Current Season (usually Sept-March): Yes           Oxygen needs?  [x] Room air Oxygen @  []1L    []2L    []3L   []4L    []5L   []6L     Use home O2? [] Yes [] No  Perform O2 challenge test using  smartphrase (.Homeoxygen)      Last bowel movement Last Bowel Movement Date: 11/14/17      Urinary Catheter Urinary Catheter 11/16/17 León-Criteria for Appropriate Use: Obstruction/retention     Urinary Catheter 11/16/17 León-Urine Output (mL): 700 ml     LDAs               Peripheral IV 11/16/17 Right Antecubital (Active)   Site Assessment Clean, dry, & intact 11/17/2017  2:09 AM   Phlebitis Assessment 0 11/17/2017  2:09 AM   Infiltration Assessment 0 11/17/2017  2:09 AM   Dressing Status Clean, dry, & intact 11/17/2017  2:09 AM   Dressing Type Tape;Transparent 11/17/2017  2:09 AM   Hub Color/Line Status Pink;Flushed;Patent; Infusing 11/17/2017  2:09 AM   Action Taken Other (comment) 11/17/2017  2:09 AM   Alcohol Cap Used Yes 11/16/2017  8:38 AM                         Readmission Risk Assessment Tool Score Low Risk            8       Total Score        3 Has Seen PCP in Last 6 Months (Yes=3, No=0)    5 Pt. Coverage (Medicare=5 , Medicaid, or Self-Pay=4)        Criteria that do not apply:    . Living with Significant Other. Assisted Living. LTAC. SNF. or   Rehab    Patient Length of Stay (>5 days = 3)    IP Visits Last 12 Months (1-3=4, 4=9, >4=11)    Charlson Comorbidity Score (Age + Comorbid Conditions)       Expected Length of Stay - - -   Actual Length of Stay 1          Opportunity for questions and clarifications were given to the incoming nurse. Patient's bed is in low position, side rails x2, door open PRN, call bell within reach and patient not in distress.       Raleigh Crumble

## 2017-11-17 NOTE — PROGRESS NOTES
Oncology Nursing Communication Tool  7:44 PM  11/16/2017     Bedside and Verbal shift change report given to Murray Shah RN (incoming nurse) by Lusi Eduardo House RN (outgoing nurse) on [employer]. Report included the following information SBAR, Kardex, Intake/Output, MAR and Recent Results. Significant changes during shift: none      Issues for physician to address: none         Oncology Shift Note   Admission Date 11/16/2017   Admission Diagnosis Sepsis ([address]  UTI (urinary tract infection)   Code Status DNR   Consults None      Cardiac Monitoring [] Yes [x] No      Purposeful Hourly Rounding [x] Yes    Magali Score Total Score: 0   Magali score 3 or > [] Bed Alarm [] Avasys [] 1:1 sitter [] Patient refused (Place signed refusal form in chart)      Pain Managed [x] Yes [] No    Key Pain Meds             acetaminophen-codeine (TYLENOL-CODEINE #3) 300-30 mg per tablet  (Taking) Take 1 Tab by mouth every six (6) hours as needed for Pain. acetaminophen (TYLENOL) 500 mg tablet  (Taking) Take 1,000 mg by mouth every six (6) hours as needed for Pain. Influenza Vaccine Received Flu Vaccine for Current Season (usually Sept-March): Yes           Oxygen needs? [x] Room air Oxygen @  []1L    []2L    []3L   []4L    []5L   []6L     Use home O2? [] Yes [x] No  Perform O2 challenge test using  smartphrase (.Homeoxygen)      Last bowel movement        Urinary Catheter Urinary Catheter 11/16/17 León-Criteria for Appropriate Use: Obstruction/retention     Urinary Catheter 11/16/17 León-Urine Output (mL): 550 ml     LDAs               Peripheral IV 11/16/17 Right Antecubital (Active)   Site Assessment Clean, dry, & intact 11/16/2017  3:14 PM   Phlebitis Assessment 0 11/16/2017  3:14 PM   Infiltration Assessment 0 11/16/2017  3:14 PM   Dressing Status Clean, dry, & intact 11/16/2017  3:14 PM   Dressing Type Tape;Transparent 11/16/2017  3:14 PM   Hub Color/Line Status Pink; Infusing 11/16/2017  3:14 PM Alcohol Cap Used Yes 11/16/2017  8:38 AM                         Readmission Risk Assessment Tool Score Low Risk            8       Total Score        3 Has Seen PCP in Last 6 Months (Yes=3, No=0)    5 Pt. Coverage (Medicare=5 , Medicaid, or Self-Pay=4)        Criteria that do not apply:    . Living with Significant Other. Assisted Living. LTAC. SNF. or   Rehab    Patient Length of Stay (>5 days = 3)    IP Visits Last 12 Months (1-3=4, 4=9, >4=11)    Charlson Comorbidity Score (Age + Comorbid Conditions)       Expected Length of Stay - - -   Actual Length of Stay 0          Opportunity for questions and clarifications were given to the incoming nurse. Patient's bed is in low position, side rails x2, door open PRN, call bell within reach and patient not in distress.       Joya Comer RN

## 2017-11-17 NOTE — PROGRESS NOTES
Oncology Nursing Communication Tool  6:55 PM  11/17/2017     Bedside and Verbal shift change report given to Prince Goyal RN (incoming nurse) by Heather Gutierrez (outgoing nurse) on [place] [address]. Report included the following information SBAR, Kardex, Procedure Summary, Intake/Output, MAR, Accordion and Recent Results. Significant changes during shift: León removed, none other to note      Issues for physician to address: Discharge       Oncology Shift Note   Admission Date 11/16/2017   Admission Diagnosis Sepsis ([facility] [address]  UTI (urinary tract infection)   Code Status DNR   Consults None      Cardiac Monitoring [] Yes [x] No      Purposeful Hourly Rounding [x] Yes    Magali Score Total Score: 0   Magali score 3 or > [] Bed Alarm [] Avasys [] 1:1 sitter [] Patient refused (Place signed refusal form in chart)      Pain Managed [x] Yes [] No    Key Pain Meds             acetaminophen-codeine (TYLENOL-CODEINE #3) 300-30 mg per tablet  (Taking) Take 1 Tab by mouth every six (6) hours as needed for Pain. acetaminophen (TYLENOL) 500 mg tablet  (Taking) Take 1,000 mg by mouth every six (6) hours as needed for Pain. Influenza Vaccine Received Flu Vaccine for Current Season (usually Sept-March): Yes           Oxygen needs?  [x] Room air Oxygen @  []1L    []2L    []3L   []4L    []5L   []6L     Use home O2? [] Yes [] No  Perform O2 challenge test using  smartphrase (.Homeoxygen)      Last bowel movement Last Bowel Movement Date: 11/14/17      Urinary Catheter [REMOVED] Urinary Catheter 11/16/17 León-Criteria for Appropriate Use: Obstruction/retention     [REMOVED] Urinary Catheter 11/16/17 León-Urine Output (mL): 200 ml     LDAs               Peripheral IV 11/16/17 Right Antecubital (Active)   Site Assessment Clean, dry, & intact 11/17/2017  3:43 PM   Phlebitis Assessment 0 11/17/2017  3:43 PM   Infiltration Assessment 0 11/17/2017  3:43 PM   Dressing Status Clean, dry, & intact 11/17/2017  3:43 PM Dressing Type Transparent;Tape 11/17/2017  3:43 PM   Hub Color/Line Status Pink;Flushed;Patent 11/17/2017  3:43 PM   Action Taken Other (comment) 11/17/2017  8:30 AM   Alcohol Cap Used Yes 11/16/2017  8:38 AM                         Readmission Risk Assessment Tool Score Low Risk            8       Total Score        3 Has Seen PCP in Last 6 Months (Yes=3, No=0)    5 Pt. Coverage (Medicare=5 , Medicaid, or Self-Pay=4)        Criteria that do not apply:    . Living with Significant Other. Assisted Living. LTAC. SNF. or   Rehab    Patient Length of Stay (>5 days = 3)    IP Visits Last 12 Months (1-3=4, 4=9, >4=11)    Charlson Comorbidity Score (Age + Comorbid Conditions)       Expected Length of Stay 3d 16h   Actual Length of Stay 1          Opportunity for questions and clarifications were given to the incoming nurse. Patient's bed is in low position, side rails x2, door open PRN, call bell within reach and patient not in distress.       Joel Núñez

## 2017-11-17 NOTE — PROGRESS NOTES
Hospitalist Progress Note    NAME: Terence Bentley   :  10/3/1931   MRN:  [id number]       Assessment / Plan:  UTI, POA. ..urine cx growing GNR. Sepsis   --failed outpatient amoxicillin x 3 days  --continue with IV rocephin. CT abdomen with non-obstructing left renal stones and mild BPH.  --CXR neg. BPH  --continue flomax. León placed in ER with output 100ml; will remove it tomorrow. Hyponatremia. Gracie Monzon Na stable. Mild renal insufficiency acute. Gracie Monzon Cr up slightly. Recheck in am.  --likely due to dehydration. Cont IVF  HTN  --continue vasotec for now but if Cr worsen, would hold  --continue toprol xl  Arthritis  --hold indomethacin prn for now  --recently finished 6 day course of prednisone for shoulder pain. He believes prednisone may be cause of his urinary difficulties.     Body mass index is 39.5 kg/(m^2).     Code:  DNR/DNI  DVT prophylaxis: lovenox  Surrogate decision maker:  Son Rene Artis      Body mass index is 39.5 kg/(m^2). Code status: DNR  Prophylaxis: Lovenox  Recommended Disposition: Home w/Family     Subjective:     Chief Complaint / Reason for Physician Visit  Follow up for urinary retention. Urinating fine now. No chest pain nor sob. Feels better. Eating okay. Discussed with RN events overnight. Review of Systems:  Symptom Y/N Comments  Symptom Y/N Comments   Fever/Chills N   Chest Pain N    Poor Appetite N   Edema N    Cough N   Abdominal Pain N    Sputum N   Joint Pain N    SOB/WOLF N   Pruritis/Rash N    Nausea/vomit N   Tolerating PT/OT Y    Diarrhea N   Tolerating Diet Y    Constipation N   Other       Could NOT obtain due to:      Objective:     VITALS:   Last 24hrs VS reviewed since prior progress note.  Most recent are:  Patient Vitals for the past 24 hrs:   Temp Pulse Resp BP SpO2   17 0935 - (!) 101 - 112/74 -   17 0800 98.4 °F (36.9 °C) 65 18 116/60 95 %   17 2358 98.5 °F (36.9 °C) 66 18 103/72 95 %   17 1603 98.3 °F (36.8 °C) 72 18 121/65 97 % Intake/Output Summary (Last 24 hours) at 11/17/17 1503  Last data filed at 11/17/17 1046   Gross per 24 hour   Intake                0 ml   Output             1850 ml   Net            -1850 ml        PHYSICAL EXAM:  General: WD, WN. Alert, cooperative, no acute distress    EENT:  EOMI. Anicteric sclerae. MMM  Resp:  CTA bilaterally, no wheezing or rales. No accessory muscle use  CV:  Regular  rhythm,  Trace lower extremity edema  GI:  Soft, Non distended, Non tender.  +Bowel sounds  Neurologic:  Alert and oriented X 3, normal speech,   Psych:   Good insight. Not anxious nor agitated  Skin:  No rashes. No jaundice    Reviewed most current lab test results and cultures  YES  Reviewed most current radiology test results   YES  Review and summation of old records today    NO  Reviewed patient's current orders and MAR    YES  PMH/ reviewed - no change compared to H&P  ________________________________________________________________________  Care Plan discussed with:    Comments   Patient x    Family  x Daughter-in-law   RN x    Care Manager     Consultant                        Multidiciplinary team rounds were held today with , nursing, pharmacist and clinical coordinator. Patient's plan of care was discussed; medications were reviewed and discharge planning was addressed. ________________________________________________________________________  Total NON critical care TIME:  20  Minutes    Total CRITICAL CARE TIME Spent:   Minutes non procedure based      Comments   >50% of visit spent in counseling and coordination of care     ________________________________________________________________________  Geovanni Tran MD     Procedures: see electronic medical records for all procedures/Xrays and details which were not copied into this note but were reviewed prior to creation of Plan. LABS:  I reviewed today's most current labs and imaging studies.   Pertinent labs include:  Recent Labs 11/16/17   0812   WBC  13.1*   HGB  16.4   HCT  49.1   PLT  145*     Recent Labs      11/16/17   0812   NA  133*   K  4.5   CL  98   CO2  25   GLU  184*   BUN  19   CREA  1.22   CA  8.9   MG  1.9   ALB  2.9*   TBILI  1.5*   SGOT  24   ALT  37       Signed: Flo Reagan MD

## 2017-11-17 NOTE — PROGRESS NOTES
79 yo male  with PMhx significant for HTN, arthritis, GERD, CAD, and kidney stones, who presents ambulatory to the ED with cc of a persistent urinary difficulty x 3 days. He notes associated symptoms of urinary urgency, lower abdominal pain radiating to his back, and chills. Patient had been on outpatient amoxicillin x 3 days per PCP. Admitted for treatment of sepsis with IV Rocephin. Patient lives in 54 > age community in private home ([address] States it is a 2 story home, but he only uses the ground level since his wife passed away. Patient reports he is very indepdendent including driving, shopping, cooking, cleaning and fishing. His son and DIL live close by and he denies use of any current DME, though he does have a walker and BSC from previous knee surgery. Pharmacy - [address] [facility] and [id number] [facility]  PCP Verified by CM: Yes (PCP - Dr. Pierrette Apley - [phone])  Mode of Transport at Discharge: Other (see comment) (Patient's son and DIL will pick him up from hospital at D/C)  Transition of Care Consult (CM Consult):  Other (Initial CM Assessment)  MyChart Signup: No  Discharge Durable Medical Equipment: No (Patient has canes and rolling walker - does not use them)  Physical Therapy Consult: Yes (No recommendations per PT)  Occupational Therapy Consult: No  Speech Therapy Consult: No  Current Support Network: Own Home, Family Lives Nearby (Patient lives in 54 > age community in private home - )  Confirm Follow Up Transport: Family (Patient usually drives himself, but his son or DIL are available to help if needed)  Plan discussed with Pt/Family/Caregiver: Yes  Discharge Location  Discharge Placement: Home with family assistance    Edgar Eaton RN, BSN, ACM   - Medical Oncology  [phone]

## 2017-11-18 VITALS
SYSTOLIC BLOOD PRESSURE: 135 MMHG | BODY MASS INDEX: 39.58 KG/M2 | OXYGEN SATURATION: 96 % | WEIGHT: 252.21 LBS | HEART RATE: 58 BPM | HEIGHT: 67 IN | TEMPERATURE: 98.5 F | DIASTOLIC BLOOD PRESSURE: 71 MMHG | RESPIRATION RATE: 16 BRPM

## 2017-11-18 LAB
ANION GAP SERPL CALC-SCNC: 6 MMOL/L (ref 5–15)
BACTERIA SPEC CULT: ABNORMAL
BASOPHILS # BLD: 0 K/UL (ref 0–0.1)
BASOPHILS NFR BLD: 0 % (ref 0–1)
BUN SERPL-MCNC: 19 MG/DL (ref 6–20)
BUN/CREAT SERPL: 21 (ref 12–20)
CALCIUM SERPL-MCNC: 8.4 MG/DL (ref 8.5–10.1)
CC UR VC: ABNORMAL
CHLORIDE SERPL-SCNC: 106 MMOL/L (ref 97–108)
CO2 SERPL-SCNC: 26 MMOL/L (ref 21–32)
CREAT SERPL-MCNC: 0.89 MG/DL (ref 0.7–1.3)
EOSINOPHIL # BLD: 0.1 K/UL (ref 0–0.4)
EOSINOPHIL NFR BLD: 1 % (ref 0–7)
ERYTHROCYTE [DISTWIDTH] IN BLOOD BY AUTOMATED COUNT: 15.8 % (ref 11.5–14.5)
GLUCOSE SERPL-MCNC: 113 MG/DL (ref 65–100)
HCT VFR BLD AUTO: 46.8 % (ref 36.6–50.3)
HGB BLD-MCNC: 15.4 G/DL (ref 12.1–17)
LYMPHOCYTES # BLD: 1.1 K/UL (ref 0.8–3.5)
LYMPHOCYTES NFR BLD: 15 % (ref 12–49)
MCH RBC QN AUTO: 30.8 PG (ref 26–34)
MCHC RBC AUTO-ENTMCNC: 32.9 G/DL (ref 30–36.5)
MCV RBC AUTO: 93.6 FL (ref 80–99)
MONOCYTES # BLD: 0.9 K/UL (ref 0–1)
MONOCYTES NFR BLD: 12 % (ref 5–13)
NEUTS SEG # BLD: 5.3 K/UL (ref 1.8–8)
NEUTS SEG NFR BLD: 72 % (ref 32–75)
PLATELET # BLD AUTO: 164 K/UL (ref 150–400)
POTASSIUM SERPL-SCNC: 4.4 MMOL/L (ref 3.5–5.1)
RBC # BLD AUTO: 5 M/UL (ref 4.1–5.7)
SERVICE CMNT-IMP: ABNORMAL
SODIUM SERPL-SCNC: 138 MMOL/L (ref 136–145)
WBC # BLD AUTO: 7.4 K/UL (ref 4.1–11.1)

## 2017-11-18 PROCEDURE — 74011250636 HC RX REV CODE- 250/636: Performed by: HOSPITALIST

## 2017-11-18 PROCEDURE — 85025 COMPLETE CBC W/AUTO DIFF WBC: CPT | Performed by: NURSE PRACTITIONER

## 2017-11-18 PROCEDURE — 80048 BASIC METABOLIC PNL TOTAL CA: CPT

## 2017-11-18 PROCEDURE — 74011000258 HC RX REV CODE- 258: Performed by: HOSPITALIST

## 2017-11-18 PROCEDURE — 36415 COLL VENOUS BLD VENIPUNCTURE: CPT

## 2017-11-18 PROCEDURE — 74011250637 HC RX REV CODE- 250/637: Performed by: HOSPITALIST

## 2017-11-18 RX ORDER — CEFDINIR 300 MG/1
300 CAPSULE ORAL 2 TIMES DAILY
Qty: 10 CAP | Refills: 0 | Status: SHIPPED | OUTPATIENT
Start: 2017-11-18 | End: 2017-11-23

## 2017-11-18 RX ORDER — SAME BUTANEDISULFONATE/BETAINE 400-600 MG
250 POWDER IN PACKET (EA) ORAL 2 TIMES DAILY
Qty: 10 CAP | Refills: 0 | Status: SHIPPED | COMMUNITY
Start: 2017-11-18 | End: 2022-01-04

## 2017-11-18 RX ADMIN — ENOXAPARIN SODIUM 40 MG: 40 INJECTION SUBCUTANEOUS at 09:34

## 2017-11-18 RX ADMIN — METOPROLOL SUCCINATE 25 MG: 50 TABLET, EXTENDED RELEASE ORAL at 09:31

## 2017-11-18 RX ADMIN — ENALAPRIL MALEATE 10 MG: 5 TABLET ORAL at 09:32

## 2017-11-18 RX ADMIN — DOCUSATE SODIUM 100 MG: 100 CAPSULE, LIQUID FILLED ORAL at 09:32

## 2017-11-18 RX ADMIN — TAMSULOSIN HYDROCHLORIDE 0.4 MG: 0.4 CAPSULE ORAL at 09:32

## 2017-11-18 RX ADMIN — Medication 10 ML: at 06:19

## 2017-11-18 RX ADMIN — CEFTRIAXONE 1 G: 1 INJECTION, POWDER, FOR SOLUTION INTRAMUSCULAR; INTRAVENOUS at 09:31

## 2017-11-18 NOTE — PROGRESS NOTES
Oncology Nursing Communication Tool  7:33 AM  11/18/2017     Bedside shift change report given to Kirsty Mike RN (incoming nurse) by Eloy Stevenson (outgoing nurse) on [place] [address]. Report included the following information SBAR, Kardex, Intake/Output, MAR and Recent Results. Significant changes during shift: none      Issues for physician to address: discharge         Oncology Shift Note   Admission Date 11/16/2017   Admission Diagnosis Sepsis ([address]  UTI (urinary tract infection)   Code Status DNR   Consults None      Cardiac Monitoring [] Yes [x] No      Purposeful Hourly Rounding [x] Yes    Magali Score Total Score: 0   Magali score 3 or > [] Bed Alarm [] Avasys [] 1:1 sitter [] Patient refused (Place signed refusal form in chart)      Pain Managed [x] Yes [] No    Key Pain Meds             acetaminophen-codeine (TYLENOL-CODEINE #3) 300-30 mg per tablet  (Taking) Take 1 Tab by mouth every six (6) hours as needed for Pain. acetaminophen (TYLENOL) 500 mg tablet  (Taking) Take 1,000 mg by mouth every six (6) hours as needed for Pain. Influenza Vaccine Received Flu Vaccine for Current Season (usually Sept-March): Yes           Oxygen needs?  [x] Room air Oxygen @  []1L    []2L    []3L   []4L    []5L   []6L     Use home O2? [] Yes [] No  Perform O2 challenge test using  smartphrase (.Homeoxygen)      Last bowel movement Last Bowel Movement Date: 11/14/17      Urinary Catheter [REMOVED] Urinary Catheter 11/16/17 León-Criteria for Appropriate Use: Obstruction/retention     [REMOVED] Urinary Catheter 11/16/17 León-Urine Output (mL): 200 ml     LDAs               Peripheral IV 11/16/17 Right Antecubital (Active)   Site Assessment Clean, dry, & intact 11/18/2017  2:28 AM   Phlebitis Assessment 0 11/18/2017  2:28 AM   Infiltration Assessment 0 11/18/2017  2:28 AM   Dressing Status Clean, dry, & intact 11/18/2017  2:28 AM   Dressing Type Tape;Transparent 11/18/2017  2:28 AM   Hub Color/Line Status Pink;Flushed;Patent; Infusing 11/18/2017  2:28 AM   Action Taken Other (comment) 11/18/2017  2:28 AM   Alcohol Cap Used Yes 11/16/2017  8:38 AM                         Readmission Risk Assessment Tool Score Low Risk            8       Total Score        3 Has Seen PCP in Last 6 Months (Yes=3, No=0)    5 Pt. Coverage (Medicare=5 , Medicaid, or Self-Pay=4)        Criteria that do not apply:    . Living with Significant Other. Assisted Living. LTAC. SNF. or   Rehab    Patient Length of Stay (>5 days = 3)    IP Visits Last 12 Months (1-3=4, 4=9, >4=11)    Charlson Comorbidity Score (Age + Comorbid Conditions)       Expected Length of Stay 3d 16h   Actual Length of Stay 2          Opportunity for questions and clarifications were given to the incoming nurse. Patient's bed is in low position, side rails x2, door open PRN, call bell within reach and patient not in distress.       Anila Sniff

## 2017-11-18 NOTE — DISCHARGE SUMMARY
Hospitalist Discharge Summary     Patient ID:  Willie Gan  [id number]  84 y.o.  10/3/1931    PCP on record: John Levi MD    Admit date: 11/16/2017  Discharge date and time: 11/18/2017      DISCHARGE DIAGNOSIS:    UTI, POA  Sepsis   BPH  Hyponatremia  Mild renal insufficiency acute  HTN  Arthritis  Obesity    Body mass index is 39.5 kg/(m^2). CONSULTATIONS:  None    Excerpted HPI from H&P of Jacek San MD:  Willie Gan is a 80 y.o.  male who presents with 5 days of urinary urgency, difficulty passing water. Saw pcp 3 days ago and started on amoxicillin for uti, no significant improvement. This morning developed subjective fever and chills. Was on 6 day course of prednisone last week for shoulder pain and thinks that's when his urinary problem began. Loss of appetite due to altered taste. +nausea, no vomiting. SOB this AM.   No chest pain, cough.       ______________________________________________________________________  DISCHARGE SUMMARY/HOSPITAL COURSE:  for full details see H&P, daily progress notes, labs, consult notes. UTI, POA. Sepsis   - failed outpatient amoxicillin x 3 days  - Urine cx grew e-coli. Pt is allergic to Cipro. Recommend pt to complete 5 days of Omnicef. Recommend to take probiotic daily while taking ABT. Received IV rocephin during hospitalization  - continue with IV rocephin.   - CT abdomen with non-obstructing left renal stones and mild BPH.  - CXR neg. BPH  - continue flomax. Voids without difficulty    Hyponatremia  Mild renal insufficiency acuteHTN  - resolved    Arthritis  - May take Tylenol as need    Obesity  Body mass index is 39.5 kg/(m^2). - pt aware of importance of healthy life style.       _______________________________________________________________________  Patient seen and examined by me on discharge day. Pertinent Findings:  Gen:    Not in distress  Chest: Clear lungs  CVS:   Regular rhythm.   No edema  Abd: Soft, not distended, not tender  Neuro:  Alert, orient x 4  _______________________________________________________________________  DISCHARGE MEDICATIONS:   Current Discharge Medication List      START taking these medications    Details   cefdinir (OMNICEF) 300 mg capsule Take 1 Cap by mouth two (2) times a day for 5 days. Indications: UTI  Qty: 10 Cap, Refills: 0         CONTINUE these medications which have NOT CHANGED    Details   metoprolol succinate (TOPROL XL) 50 mg XL tablet Take 25 mg by mouth daily. acetaminophen-codeine (TYLENOL-CODEINE #3) 300-30 mg per tablet Take 1 Tab by mouth every six (6) hours as needed for Pain. acetaminophen (TYLENOL) 500 mg tablet Take 1,000 mg by mouth every six (6) hours as needed for Pain.      tamsulosin (FLOMAX) 0.4 mg capsule Take 0.4 mg by mouth daily. enalapril (VASOTEC) 10 mg tablet Take 10 mg by mouth daily. Saccharomyces boulardii (PROBIOTIC, S.BOULARDII,) 250 mg capsule Take 1 Cap by mouth two (2) times a day. Qty: 10 Cap, Refills: 0         STOP taking these medications       amoxicillin (AMOXIL) 500 mg capsule Comments:   Reason for Stopping:         amoxicillin (AMOXIL) 500 mg capsule Comments:   Reason for Stopping:         predniSONE (DELTASONE) 20 mg tablet Comments:   Reason for Stopping:               My Recommended Diet, Activity, Wound Care, and follow-up labs are listed in the patient's Discharge Insturctions which I have personally completed and reviewed.     _______________________________________________________________________  DISPOSITION:    Home with Family: y   Home with HH/PT/OT/RN:    SNF/LTC:    KEEGAN:    OTHER:        Condition at Discharge:  Stable  _______________________________________________________________________  Follow up with:   PCP : Ariana Tomlin MD  Follow-up Information     Follow up With Details Comments [address], [place] Schedule an appointment as soon as possible for a visit to be seen within 1 week [address] [place] [address]  [phone]                Total time in minutes spent coordinating this discharge (includes going over instructions, follow-up, prescriptions, and preparing report for sign off to her PCP) :  30 minutes    Signed:  Cee Salinas NP

## 2017-11-18 NOTE — DISCHARGE INSTRUCTIONS
Patient Discharge Instructions     Pt Name  Jennifer Nowak   Date of Birth 10/3/1931   Age  80 y.o. Medical Record Number  [id number]   PCP Ariana Tolmin MD    Admit date:  11/16/2017 @    [address] [place] [address]    Room Number  1123/01   Date of Discharge 11/18/2017     Admission Diagnoses:     UTI (urinary tract infection)          Allergies   Allergen Reactions    Ciprofloxacin Swelling     In hands and feet    Other Medication Other (comments)     Pt states cough medicine afters his prostrate        You were admitted to [address] [place] [address] for  UTI (urinary tract infection)    YOUR OTHER MEDICAL DIAGNOSES INCLUDE (BUT NOT LIMITED TO ):  Present on Admission:   UTI (urinary tract infection)   Sepsis ([address]   Morbid obesity ([facility] [address]   Coronary atherosclerosis of native coronary artery-- RCA with L>R collaterals 1/2012   DJD (degenerative joint disease)      DIET:  Cardiac Diet     Recommended activity: Activity as tolerated  Follow up : Follow-up Information     Follow up With Details Comments [address], [place] Schedule an appointment as soon as possible for a visit to be seen within 1 week [address] [place] [address]  [phone]            ** Please complete your antibiotic as recommended and follow up with your primary care doctor within one week      · It is important that you take the medication exactly as they are prescribed. · Keep your medication in the bottles provided by the pharmacist and keep a list of the medication names, dosages, and times to be taken in your wallet. · Do not take other medications without consulting your doctor.        ADDITIONAL INFORMATION: If you experience any of the following symptoms or have any health problem not listed below, then please call your primary care physician or return to the emergency room if you cannot get hold of your doctor: Fever, chills, nausea, vomiting, diarrhea, change in mentation, falling, bleeding, shortness of breath. I understand that if any problems occur once I am discharged, I am supposed to call my Primary care physician for further care or seek help in the Emergency Department at the nearest Healthcare facility. I have had an opportunity to discuss my clinical issues with my doctor and nursing staff. I understand and acknowledge receipt of the above instructions.                                                                                                                                            Physician's or R.N.'s Signature                                                            Date/Time                                                                                                                                              Patient or Representative Signature                                                 Date/Time

## 2017-11-21 LAB
BACTERIA SPEC CULT: NORMAL
SERVICE CMNT-IMP: NORMAL

## 2019-12-17 ENCOUNTER — APPOINTMENT (OUTPATIENT)
Dept: CT IMAGING | Age: 84
End: 2019-12-17
Attending: EMERGENCY MEDICINE
Payer: MEDICARE

## 2019-12-17 ENCOUNTER — APPOINTMENT (OUTPATIENT)
Dept: GENERAL RADIOLOGY | Age: 84
End: 2019-12-17
Attending: EMERGENCY MEDICINE
Payer: MEDICARE

## 2019-12-17 ENCOUNTER — HOSPITAL ENCOUNTER (EMERGENCY)
Age: 84
Discharge: HOME | End: 2019-12-17
Attending: EMERGENCY MEDICINE
Payer: MEDICARE

## 2019-12-17 VITALS
DIASTOLIC BLOOD PRESSURE: 63 MMHG | HEART RATE: 64 BPM | SYSTOLIC BLOOD PRESSURE: 120 MMHG | RESPIRATION RATE: 17 BRPM | WEIGHT: 231.48 LBS | OXYGEN SATURATION: 97 % | HEIGHT: 68 IN | TEMPERATURE: 97.9 F | BODY MASS INDEX: 35.08 KG/M2

## 2019-12-17 DIAGNOSIS — R91.8 GROUND GLASS OPACITY PRESENT ON IMAGING OF LUNG: ICD-10-CM

## 2019-12-17 DIAGNOSIS — R07.9 ACUTE CHEST PAIN: Primary | ICD-10-CM

## 2019-12-17 DIAGNOSIS — D72.829 LEUKOCYTOSIS, UNSPECIFIED TYPE: ICD-10-CM

## 2019-12-17 DIAGNOSIS — R13.10 DYSPHAGIA, UNSPECIFIED TYPE: ICD-10-CM

## 2019-12-17 LAB
ALBUMIN SERPL-MCNC: 3.4 G/DL (ref 3.5–5)
ALBUMIN/GLOB SERPL: 0.9 {RATIO} (ref 1.1–2.2)
ALP SERPL-CCNC: 64 U/L (ref 45–117)
ALT SERPL-CCNC: 25 U/L (ref 12–78)
ANION GAP SERPL CALC-SCNC: 7 MMOL/L (ref 5–15)
AST SERPL-CCNC: 21 U/L (ref 15–37)
ATRIAL RATE: 75 BPM
BASOPHILS # BLD: 0 K/UL (ref 0–0.1)
BASOPHILS NFR BLD: 0 % (ref 0–1)
BILIRUB SERPL-MCNC: 0.6 MG/DL (ref 0.2–1)
BUN SERPL-MCNC: 18 MG/DL (ref 6–20)
BUN/CREAT SERPL: 19 (ref 12–20)
CALCIUM SERPL-MCNC: 9.8 MG/DL (ref 8.5–10.1)
CALCULATED P AXIS, ECG09: 28 DEGREES
CALCULATED R AXIS, ECG10: -79 DEGREES
CALCULATED T AXIS, ECG11: 48 DEGREES
CHLORIDE SERPL-SCNC: 104 MMOL/L (ref 97–108)
CK SERPL-CCNC: 60 U/L (ref 39–308)
CO2 SERPL-SCNC: 26 MMOL/L (ref 21–32)
CREAT SERPL-MCNC: 0.94 MG/DL (ref 0.7–1.3)
DIAGNOSIS, 93000: NORMAL
DIFFERENTIAL METHOD BLD: ABNORMAL
EOSINOPHIL # BLD: 0.1 K/UL (ref 0–0.4)
EOSINOPHIL NFR BLD: 1 % (ref 0–7)
ERYTHROCYTE [DISTWIDTH] IN BLOOD BY AUTOMATED COUNT: 14.7 % (ref 11.5–14.5)
GLOBULIN SER CALC-MCNC: 3.8 G/DL (ref 2–4)
GLUCOSE SERPL-MCNC: 111 MG/DL (ref 65–100)
HCT VFR BLD AUTO: 48.1 % (ref 36.6–50.3)
HGB BLD-MCNC: 15.2 G/DL (ref 12.1–17)
IMM GRANULOCYTES # BLD AUTO: 0 K/UL (ref 0–0.04)
IMM GRANULOCYTES NFR BLD AUTO: 0 % (ref 0–0.5)
LYMPHOCYTES # BLD: 1.6 K/UL (ref 0.8–3.5)
LYMPHOCYTES NFR BLD: 12 % (ref 12–49)
MCH RBC QN AUTO: 30.6 PG (ref 26–34)
MCHC RBC AUTO-ENTMCNC: 31.6 G/DL (ref 30–36.5)
MCV RBC AUTO: 96.8 FL (ref 80–99)
MONOCYTES # BLD: 1.1 K/UL (ref 0–1)
MONOCYTES NFR BLD: 9 % (ref 5–13)
NEUTS SEG # BLD: 10 K/UL (ref 1.8–8)
NEUTS SEG NFR BLD: 78 % (ref 32–75)
NRBC # BLD: 0 K/UL (ref 0–0.01)
NRBC BLD-RTO: 0 PER 100 WBC
P-R INTERVAL, ECG05: 186 MS
PLATELET # BLD AUTO: 216 K/UL (ref 150–400)
PMV BLD AUTO: 10.3 FL (ref 8.9–12.9)
POTASSIUM SERPL-SCNC: 4.2 MMOL/L (ref 3.5–5.1)
PROT SERPL-MCNC: 7.2 G/DL (ref 6.4–8.2)
Q-T INTERVAL, ECG07: 450 MS
QRS DURATION, ECG06: 170 MS
QTC CALCULATION (BEZET), ECG08: 502 MS
RBC # BLD AUTO: 4.97 M/UL (ref 4.1–5.7)
SODIUM SERPL-SCNC: 137 MMOL/L (ref 136–145)
TROPONIN I BLD-MCNC: <0.04 NG/ML (ref 0–0.08)
TROPONIN I SERPL-MCNC: <0.05 NG/ML
VENTRICULAR RATE, ECG03: 75 BPM
WBC # BLD AUTO: 12.8 K/UL (ref 4.1–11.1)

## 2019-12-17 PROCEDURE — 85025 COMPLETE CBC W/AUTO DIFF WBC: CPT

## 2019-12-17 PROCEDURE — 96374 THER/PROPH/DIAG INJ IV PUSH: CPT

## 2019-12-17 PROCEDURE — 93005 ELECTROCARDIOGRAM TRACING: CPT

## 2019-12-17 PROCEDURE — 84484 ASSAY OF TROPONIN QUANT: CPT

## 2019-12-17 PROCEDURE — 36415 COLL VENOUS BLD VENIPUNCTURE: CPT

## 2019-12-17 PROCEDURE — 82550 ASSAY OF CK (CPK): CPT

## 2019-12-17 PROCEDURE — 74011000250 HC RX REV CODE- 250: Performed by: EMERGENCY MEDICINE

## 2019-12-17 PROCEDURE — 71046 X-RAY EXAM CHEST 2 VIEWS: CPT

## 2019-12-17 PROCEDURE — 99285 EMERGENCY DEPT VISIT HI MDM: CPT

## 2019-12-17 PROCEDURE — 71275 CT ANGIOGRAPHY CHEST: CPT

## 2019-12-17 PROCEDURE — 80053 COMPREHEN METABOLIC PANEL: CPT

## 2019-12-17 PROCEDURE — 74011250637 HC RX REV CODE- 250/637: Performed by: EMERGENCY MEDICINE

## 2019-12-17 PROCEDURE — 74011250636 HC RX REV CODE- 250/636: Performed by: EMERGENCY MEDICINE

## 2019-12-17 PROCEDURE — 74011636320 HC RX REV CODE- 636/320: Performed by: EMERGENCY MEDICINE

## 2019-12-17 RX ORDER — DOXYCYCLINE HYCLATE 100 MG
100 TABLET ORAL 2 TIMES DAILY
Qty: 14 TAB | Refills: 0 | Status: SHIPPED | OUTPATIENT
Start: 2019-12-17 | End: 2019-12-24

## 2019-12-17 RX ORDER — SODIUM CHLORIDE 0.9 % (FLUSH) 0.9 %
10 SYRINGE (ML) INJECTION
Status: COMPLETED | OUTPATIENT
Start: 2019-12-17 | End: 2019-12-17

## 2019-12-17 RX ADMIN — Medication 10 ML: at 18:12

## 2019-12-17 RX ADMIN — FAMOTIDINE 20 MG: 10 INJECTION, SOLUTION INTRAVENOUS at 17:02

## 2019-12-17 RX ADMIN — LIDOCAINE HYDROCHLORIDE 40 ML: 20 SOLUTION ORAL; TOPICAL at 17:02

## 2019-12-17 RX ADMIN — IOPAMIDOL 80 ML: 755 INJECTION, SOLUTION INTRAVENOUS at 18:12

## 2019-12-17 NOTE — ED PROVIDER NOTES
EMERGENCY DEPARTMENT HISTORY AND PHYSICAL EXAM      Date: 12/17/2019  Patient Name: Yogesh Erickson    History of Presenting Illness     Chief Complaint   Patient presents with    Chest Pain     Pain at substernal area to mid back since Thursday. Reports difficulty with swallowing at times. History Provided By: Patient    HPI: Yogesh Erickson, 80 y.o. male  presents to the ED with cc of pain between his shoulder blades and chest pain. Patient states he has had symptoms for 6 days. Pain will be located mostly between his shoulder blades in his back and then he will have occasional radiation of pain to the substernal area of his chest.  He has not had any shortness of breath. No pain or swelling in his legs. No nausea vomiting or diarrhea. Said no fevers or chills. No cough. He has had a history of a lobectomy due to lung cancer. He did not receive any chemo or radiation. He has had esophageal dilatation procedure in 2014 due to esophageal stricture. He states he will get pain with swallowing at times and has difficulty with swallowing food. There are no other complaints, changes, or physical findings at this time. PCP: Vandana Park MD    No current facility-administered medications on file prior to encounter. Current Outpatient Medications on File Prior to Encounter   Medication Sig Dispense Refill    Saccharomyces boulardii (PROBIOTIC, S.BOULARDII,) 250 mg capsule Take 1 Cap by mouth two (2) times a day. 10 Cap 0    metoprolol succinate (TOPROL XL) 50 mg XL tablet Take 25 mg by mouth daily.  acetaminophen-codeine (TYLENOL-CODEINE #3) 300-30 mg per tablet Take 1 Tab by mouth every six (6) hours as needed for Pain.  acetaminophen (TYLENOL) 500 mg tablet Take 1,000 mg by mouth every six (6) hours as needed for Pain.  tamsulosin (FLOMAX) 0.4 mg capsule Take 0.4 mg by mouth daily.  enalapril (VASOTEC) 10 mg tablet Take 10 mg by mouth daily.          Past History Past Medical History:  Past Medical History:   Diagnosis Date    Arthritis     CAD (coronary artery disease)     Chest pain, unspecified     Essential hypertension     Essential hypertension, benign     GERD (gastroesophageal reflux disease)     lower back    Hypertension     Lung [facility] [address] [facility]     Mixed hyperlipidemia     Recurrent kidney stones        Past Surgical History:  Past Surgical History:   Procedure Laterality Date    CHEST SURGERY PROCEDURE UNLISTED  9/3/13    left thoracoscopy; left upper lobectomy; left pericostal nerve block; lymph node dissection    HX APPENDECTOMY      HX HEART CATHETERIZATION      HX HEENT      tonsillectomy    HX ORTHOPAEDIC      back surgery x2 , right shoulder surgery    HX ORTHOPAEDIC      bilateral knee replacement    HX OTHER SURGICAL      neck or cancer removal       Family History:  Family History   Problem Relation Age of Onset    Diabetes Brother     Heart Disease Brother        Social History:  Social History     Tobacco Use    Smoking status: Former Smoker     Packs/day: 1.00     Years: 20.00     Pack years: 20.00     Last attempt to quit: 1985     Years since quittin.9    Smokeless tobacco: Never Used   Substance Use Topics    Alcohol use: No    Drug use: No       Allergies: Allergies   Allergen Reactions    Ciprofloxacin Swelling     In hands and feet    Other Medication Other (comments)     Pt states cough medicine afters his prostrate         Review of Systems   Review of Systems   Constitutional: Negative for chills and fever. HENT: Positive for trouble swallowing. Negative for congestion, ear pain, rhinorrhea and sore throat. Eyes: Negative for visual disturbance. Respiratory: Negative for cough, chest tightness and shortness of breath. Cardiovascular: Positive for chest pain. Negative for palpitations. Gastrointestinal: Negative for abdominal pain, blood in stool, constipation, diarrhea, nausea and vomiting. Genitourinary: Negative for decreased urine volume, difficulty urinating, dysuria and frequency. Musculoskeletal: Positive for back pain. Negative for neck pain. Skin: Negative for color change and rash. Neurological: Negative for dizziness, weakness, light-headedness and headaches. Physical Exam   Physical Exam  Vitals signs and nursing note reviewed. Constitutional:       General: He is not in acute distress. Appearance: He is well-developed. He is obese. He is not ill-appearing. Eyes:      Conjunctiva/sclera: Conjunctivae normal.   Neck:      Musculoskeletal: Neck supple. Cardiovascular:      Rate and Rhythm: Normal rate and regular rhythm. Pulmonary:      Effort: Pulmonary effort is normal. No accessory muscle usage or respiratory distress. Breath sounds: Normal breath sounds. Abdominal:      General: There is no distension. Palpations: Abdomen is soft. Tenderness: There is no tenderness. Lymphadenopathy:      Cervical: No cervical adenopathy. Skin:     General: Skin is warm and dry. Neurological:      Mental Status: He is alert and oriented to person, place, and time. Cranial Nerves: No cranial nerve deficit. Sensory: No sensory deficit. Diagnostic Study Results     Labs -     Recent Results (from the past 24 hour(s))   EKG, 12 LEAD, INITIAL    Collection Time: 12/17/19  3:08 PM   Result Value Ref Range    Ventricular Rate 75 BPM    Atrial Rate 75 BPM    P-R Interval 186 ms    QRS Duration 170 ms    Q-T Interval 450 ms    QTC Calculation (Bezet) 502 ms    Calculated P Axis 28 degrees    Calculated R Axis -79 degrees    Calculated T Axis 48 degrees    Diagnosis       Normal sinus rhythm  Right bundle branch block  Left anterior fascicular block  ** Bifascicular block **  Septal infarct , age undetermined  When compared with ECG of 16-NOV-2017 07:46,  Vent.  rate has decreased BY  44 BPM  QRS duration has increased     CBC WITH AUTOMATED DIFF Collection Time: 12/17/19  3:17 PM   Result Value Ref Range    WBC 12.8 (H) 4.1 - 11.1 K/uL    RBC 4.97 4.10 - 5.70 M/uL    HGB 15.2 12.1 - 17.0 g/dL    HCT 48.1 36.6 - 50.3 %    MCV 96.8 80.0 - 99.0 FL    MCH 30.6 26.0 - 34.0 PG    MCHC 31.6 30.0 - 36.5 g/dL    RDW 14.7 (H) 11.5 - 14.5 %    PLATELET 273 459 - 457 K/uL    MPV 10.3 8.9 - 12.9 FL    NRBC 0.0 0  WBC    ABSOLUTE NRBC 0.00 0.00 - 0.01 K/uL    NEUTROPHILS 78 (H) 32 - 75 %    LYMPHOCYTES 12 12 - 49 %    MONOCYTES 9 5 - 13 %    EOSINOPHILS 1 0 - 7 %    BASOPHILS 0 0 - 1 %    IMMATURE GRANULOCYTES 0 0.0 - 0.5 %    ABS. NEUTROPHILS 10.0 (H) 1.8 - 8.0 K/UL    ABS. LYMPHOCYTES 1.6 0.8 - 3.5 K/UL    ABS. MONOCYTES 1.1 (H) 0.0 - 1.0 K/UL    ABS. EOSINOPHILS 0.1 0.0 - 0.4 K/UL    ABS. BASOPHILS 0.0 0.0 - 0.1 K/UL    ABS. IMM. GRANS. 0.0 0.00 - 0.04 K/UL    DF AUTOMATED     METABOLIC PANEL, COMPREHENSIVE    Collection Time: 12/17/19  3:17 PM   Result Value Ref Range    Sodium 137 136 - 145 mmol/L    Potassium 4.2 3.5 - 5.1 mmol/L    Chloride 104 97 - 108 mmol/L    CO2 26 21 - 32 mmol/L    Anion gap 7 5 - 15 mmol/L    Glucose 111 (H) 65 - 100 mg/dL    BUN 18 6 - 20 MG/DL    Creatinine 0.94 0.70 - 1.30 MG/DL    BUN/Creatinine ratio 19 12 - 20      GFR est AA >60 >60 ml/min/1.73m2    GFR est non-AA >60 >60 ml/min/1.73m2    Calcium 9.8 8.5 - 10.1 MG/DL    Bilirubin, total 0.6 0.2 - 1.0 MG/DL    ALT (SGPT) 25 12 - 78 U/L    AST (SGOT) 21 15 - 37 U/L    Alk.  phosphatase 64 45 - 117 U/L    Protein, total 7.2 6.4 - 8.2 g/dL    Albumin 3.4 (L) 3.5 - 5.0 g/dL    Globulin 3.8 2.0 - 4.0 g/dL    A-G Ratio 0.9 (L) 1.1 - 2.2     CK W/ REFLX CKMB    Collection Time: 12/17/19  3:17 PM   Result Value Ref Range    CK 60 39 - 308 U/L   TROPONIN I    Collection Time: 12/17/19  3:17 PM   Result Value Ref Range    Troponin-I, Qt. <0.05 <0.05 ng/mL   POC TROPONIN-I    Collection Time: 12/17/19  3:28 PM   Result Value Ref Range    Troponin-I (POC) <0.04 0.00 - 0.08 ng/mL Radiologic Studies -   CTA CHEST W OR W WO CONT   Final Result   IMPRESSION:   1. No evidence of thoracic aortic aneurysm or dissection. 2.  Although not designed for evaluation of pulmonary embolus, no definite   pulmonary embolus is identified. 3.  Cardiomegaly and coronary atherosclerotic disease. Groundglass opacities   which may represent edema or be secondary to hyperaeration. 4.  Status post left upper lobectomy. .       XR CHEST PA LAT   Final Result   IMPRESSION:   1. Slightly improved aeration of the left chest. Atelectasis right base. Otherwise no acute abnormality or interval change           CT Results  (Last 48 hours)               12/17/19 1812  CTA CHEST W OR W WO CONT Final result    Impression:  IMPRESSION:   1. No evidence of thoracic aortic aneurysm or dissection. 2.  Although not designed for evaluation of pulmonary embolus, no definite   pulmonary embolus is identified. 3.  Cardiomegaly and coronary atherosclerotic disease. Groundglass opacities   which may represent edema or be secondary to hyperaeration. 4.  Status post left upper lobectomy. .        Narrative:  INDICATION: Aortic dz, non-traumatic, known or suspect       EXAMINATION:  CT ANGIOGRAPHY CHEST       COMPARISON: 10/22/2014       TECHNIQUE:  CT Angiography of the chest was performed following the uneventful   administration of IV contrast.  3D image postprocessing was performed. CT dose   reduction was achieved through the use of a standardized protocol tailored for   this examination and automatic exposure control for dose modulation. FINDINGS:       THYROID: Small hypodensities in the thyroid gland. .   MEDIASTINUM/AVA: Status post left upper lobectomy. No adenopathy is   identified. . Mild esophageal thickening. No definite pulmonary embolus   HEART/PERICARDIUM: Coronary atherosclerotic disease. Mild cardiomegaly. .   AORTA:  No evidence of aneurysm or dissection. .   LUNGS: No pulmonary edema, pleural effusion or focal airspace disease. Hypoaeration. Diffuse groundglass opacity. INCIDENTALLY IMAGED UPPER ABDOMEN: No significant abnormality. BONES: No destructive bone lesion. ADDITIONAL COMMENTS: N/A. CXR Results  (Last 48 hours)               12/17/19 1602  XR CHEST PA LAT Final result    Impression:  IMPRESSION:   1. Slightly improved aeration of the left chest. Atelectasis right base. Otherwise no acute abnormality or interval change           Narrative:  INDICATION:  cp        EXAM: Chest 2 views. Comparison November 17, 2017       FINDINGS: Cardiac silhouette and aortic arch remain enlarged. Left hemidiaphragm   remains elevated. There is slight improved aeration of the left lung. No new   consolidation. There is atelectasis right base with no pleural effusion. No   pneumothorax                   Medical Decision Making   I am the first provider for this patient. I reviewed the vital signs, available nursing notes, past medical history, past surgical history, family history and social history. Vital Signs-Reviewed the patient's vital signs. Patient Vitals for the past 24 hrs:   Temp Pulse Resp BP SpO2   12/17/19 1900  64 17 120/63 97 %   12/17/19 1830  69 15 134/62 98 %   12/17/19 1730  66 16 128/65 98 %   12/17/19 1700  70 16 123/62 99 %   12/17/19 1512 97.9 °F (36.6 °C) 77 18 158/80 98 %         Records Reviewed: Nursing Notes and Old Medical Records    Provider Notes (Medical Decision Making): This patient is presenting with chest pain. Has a nonischemic EKG and normal cardiac enzymes. Pain is been present for several days therefore one set of cardiac enzymes would be sufficient. Very atypical presentation for ACS as well. Patient stated his pain started between his shoulder blades. CTA of the chest was obtained to rule out dissection and there was no acute findings of the aorta. Do see diffuse groundglass opacities.   He does have a leukocytosis on labs. This may suggest a pneumonia. Patient states he had a very bad cold just in the past couple weeks. I will place him on doxycycline to see if this helps with his symptoms. He also endorses dysphasia and has had a history of esophageal strictures. In the differential a recurrent stricture is possible and may be given him his pain. Recommend follow-up with gastroenterology. Pain may also be musculoskeletal.    ED Course:   Initial assessment performed. The patients presenting problems have been discussed, and they are in agreement with the care plan formulated and outlined with them. I have encouraged them to ask questions as they arise throughout their visit. Orders Placed This Encounter    XR CHEST PA LAT    CTA CHEST W OR W WO CONT    CBC WITH AUTOMATED DIFF    METABOLIC PANEL, COMPREHENSIVE    CK W/ REFLX CKMB    TROPONIN I (Utilize POC if available)    CHEST PAIN PANEL TRACKING (DO NOT DESELECT)    EKG NOTEWRITER(ASAP ONLY)    OXYGEN CANNULA Liters per minute: 2; Indications for O2 therapy: CHEST PAIN CONTINUOUS STAT    POC TROPONIN-I    EKG 12 LEAD INITIAL    SALINE LOCK IV ONE TIME STAT    maalox/viscous lidocaine (COV GI COCKTAIL)    DISCONTD: famotidine (PF) (PEPCID) 20 mg in 0.9% sodium chloride 10 mL injection    famotidine (PF) (PEPCID) 20 mg in 0.9% sodium chloride 10 mL injection    iopamidol (ISOVUE-370) 76 % injection 100 mL    sodium chloride (NS) flush 10 mL    doxycycline (VIBRA-TABS) 100 mg tablet       EKG  Date/Time: 12/17/2019 3:08 PM  Performed by: Jhony Pacheco MD  Authorized by: Jhony Pacheco MD     ECG reviewed by ED Physician in the absence of a cardiologist: yes    Previous ECG:     Previous ECG:  Compared to current    Similarity:  No change  Rate:     ECG rate:  75    ECG rate assessment: normal    Rhythm:     Rhythm: sinus rhythm    Ectopy:     Ectopy: none    QRS:     QRS axis:  Normal    QRS intervals:   Wide  Conduction: Conduction: abnormal      Abnormal conduction: complete RBBB, LAFB and bifascicular block    ST segments:     ST segments:  Normal  T waves:     T waves: non-specific            Critical Care Time:   0    Disposition:  Discharge  7:14 PM    The patient's emergency department evaluation is now complete. I have reviewed all labs, imaging, and pertinent information. I have discussed all results with the patient and/or family. Based on our evaluation today I do believe that the patient is safe to be discharged home. The patient has been provided with at home instructions that are pertinent to their complaint today, although these may not be specific to the exact diagnosis. I have reviewed the patient's home medications and attempted to reconcile if not already done so by pharmacy or nursing staff. I have discussed all new prescriptions with the patient. The patient has been encouraged to follow-up with primary care doctor and/or specialist, and these have been discussed with the patient. The patient has been advised that they may return to the emergency department if they have any worsening symptoms and or new symptoms that are of concern to them. Verbal discharge instructions may have also been provided to the patient that may not be specifically contained in the written discharge instructions. The patient has been given opportunity to ask questions prior to discharge. PLAN:  1. Current Discharge Medication List      START taking these medications    Details   doxycycline (VIBRA-TABS) 100 mg tablet Take 1 Tab by mouth two (2) times a day for 7 days. Qty: 14 Tab, Refills: 0           2.    Follow-up Information     Follow up With Specialties Details Why [address] [place] [address], [address] Jaeger MD Internal Medicine Schedule an appointment as soon as possible for a visit   [address] [place] [address]      Elmira Bradshaw MD Gastroenterology Schedule an appointment as soon as possible for a visit  If symptoms worsen (swallowing) Ayala Ann  [phone]          Return to ED if worse     Diagnosis     Clinical Impression:   1. Acute chest pain    2. Ground glass opacity present on imaging of lung    3. Leukocytosis, unspecified type    4. Dysphagia, unspecified type            This note will not be viewable in MyChart.

## 2019-12-18 NOTE — ED NOTES
Patient discharged by Dr. Ezequiel Maldonado and Hugh Edwards RN. Patient ambulated with steady gait in NAD accompanied by family on departure.

## 2020-01-14 ENCOUNTER — HOSPITAL ENCOUNTER (OUTPATIENT)
Dept: GENERAL RADIOLOGY | Age: 85
Discharge: HOME | End: 2020-01-14
Attending: INTERNAL MEDICINE
Payer: MEDICARE

## 2020-01-14 DIAGNOSIS — K22.2 ESOPHAGEAL STRICTURE: ICD-10-CM

## 2020-01-14 DIAGNOSIS — R13.10 DYSPHAGIA: ICD-10-CM

## 2020-01-14 PROCEDURE — 74220 X-RAY XM ESOPHAGUS 1CNTRST: CPT

## 2020-01-22 ENCOUNTER — HOSPITAL ENCOUNTER (OUTPATIENT)
Age: 85
Setting detail: OUTPATIENT SURGERY
Discharge: HOME | End: 2020-01-22
Attending: INTERNAL MEDICINE | Admitting: INTERNAL MEDICINE
Payer: MEDICARE

## 2020-01-22 ENCOUNTER — ANESTHESIA EVENT (OUTPATIENT)
Dept: ENDOSCOPY | Age: 85
End: 2020-01-22
Payer: MEDICARE

## 2020-01-22 ENCOUNTER — ANESTHESIA (OUTPATIENT)
Dept: ENDOSCOPY | Age: 85
End: 2020-01-22
Payer: MEDICARE

## 2020-01-22 VITALS
DIASTOLIC BLOOD PRESSURE: 79 MMHG | WEIGHT: 229.44 LBS | HEART RATE: 56 BPM | BODY MASS INDEX: 36.01 KG/M2 | RESPIRATION RATE: 19 BRPM | SYSTOLIC BLOOD PRESSURE: 144 MMHG | OXYGEN SATURATION: 98 % | TEMPERATURE: 97.4 F | HEIGHT: 67 IN

## 2020-01-22 PROCEDURE — 77030018712 HC DEV BLLN INFL BSC -B: Performed by: INTERNAL MEDICINE

## 2020-01-22 PROCEDURE — 88305 TISSUE EXAM BY PATHOLOGIST: CPT

## 2020-01-22 PROCEDURE — 77030019988 HC FCPS ENDOSC DISP BSC -B: Performed by: INTERNAL MEDICINE

## 2020-01-22 PROCEDURE — 74011000250 HC RX REV CODE- 250: Performed by: NURSE ANESTHETIST, CERTIFIED REGISTERED

## 2020-01-22 PROCEDURE — C1726 CATH, BAL DIL, NON-VASCULAR: HCPCS | Performed by: INTERNAL MEDICINE

## 2020-01-22 PROCEDURE — 76060000031 HC ANESTHESIA FIRST 0.5 HR: Performed by: INTERNAL MEDICINE

## 2020-01-22 PROCEDURE — 74011250636 HC RX REV CODE- 250/636: Performed by: NURSE ANESTHETIST, CERTIFIED REGISTERED

## 2020-01-22 PROCEDURE — 76040000019: Performed by: INTERNAL MEDICINE

## 2020-01-22 PROCEDURE — 74011250636 HC RX REV CODE- 250/636: Performed by: INTERNAL MEDICINE

## 2020-01-22 RX ORDER — SODIUM CHLORIDE 9 MG/ML
75 INJECTION, SOLUTION INTRAVENOUS CONTINUOUS
Status: DISCONTINUED | OUTPATIENT
Start: 2020-01-22 | End: 2020-01-22 | Stop reason: HOSPADM

## 2020-01-22 RX ORDER — ATROPINE SULFATE 0.1 MG/ML
0.5 INJECTION INTRAVENOUS
Status: DISCONTINUED | OUTPATIENT
Start: 2020-01-22 | End: 2020-01-22 | Stop reason: HOSPADM

## 2020-01-22 RX ORDER — SODIUM CHLORIDE 0.9 % (FLUSH) 0.9 %
5-40 SYRINGE (ML) INJECTION AS NEEDED
Status: DISCONTINUED | OUTPATIENT
Start: 2020-01-22 | End: 2020-01-22 | Stop reason: HOSPADM

## 2020-01-22 RX ORDER — EPINEPHRINE 0.1 MG/ML
1 INJECTION INTRACARDIAC; INTRAVENOUS
Status: DISCONTINUED | OUTPATIENT
Start: 2020-01-22 | End: 2020-01-22 | Stop reason: HOSPADM

## 2020-01-22 RX ORDER — FLUMAZENIL 0.1 MG/ML
0.2 INJECTION INTRAVENOUS
Status: DISCONTINUED | OUTPATIENT
Start: 2020-01-22 | End: 2020-01-22 | Stop reason: HOSPADM

## 2020-01-22 RX ORDER — NALOXONE HYDROCHLORIDE 0.4 MG/ML
0.4 INJECTION, SOLUTION INTRAMUSCULAR; INTRAVENOUS; SUBCUTANEOUS
Status: DISCONTINUED | OUTPATIENT
Start: 2020-01-22 | End: 2020-01-22 | Stop reason: HOSPADM

## 2020-01-22 RX ORDER — SODIUM CHLORIDE 0.9 % (FLUSH) 0.9 %
5-40 SYRINGE (ML) INJECTION EVERY 8 HOURS
Status: DISCONTINUED | OUTPATIENT
Start: 2020-01-22 | End: 2020-01-22 | Stop reason: HOSPADM

## 2020-01-22 RX ORDER — PROPOFOL 10 MG/ML
INJECTION, EMULSION INTRAVENOUS AS NEEDED
Status: DISCONTINUED | OUTPATIENT
Start: 2020-01-22 | End: 2020-01-22 | Stop reason: HOSPADM

## 2020-01-22 RX ORDER — LIDOCAINE HYDROCHLORIDE 20 MG/ML
INJECTION, SOLUTION EPIDURAL; INFILTRATION; INTRACAUDAL; PERINEURAL AS NEEDED
Status: DISCONTINUED | OUTPATIENT
Start: 2020-01-22 | End: 2020-01-22 | Stop reason: HOSPADM

## 2020-01-22 RX ORDER — DEXTROMETHORPHAN/PSEUDOEPHED 2.5-7.5/.8
1.2 DROPS ORAL
Status: DISCONTINUED | OUTPATIENT
Start: 2020-01-22 | End: 2020-01-22 | Stop reason: HOSPADM

## 2020-01-22 RX ADMIN — PROPOFOL 30 MG: 10 INJECTION, EMULSION INTRAVENOUS at 12:01

## 2020-01-22 RX ADMIN — PROPOFOL 20 MG: 10 INJECTION, EMULSION INTRAVENOUS at 12:04

## 2020-01-22 RX ADMIN — PROPOFOL 20 MG: 10 INJECTION, EMULSION INTRAVENOUS at 12:07

## 2020-01-22 RX ADMIN — SODIUM CHLORIDE 75 ML/HR: 900 INJECTION, SOLUTION INTRAVENOUS at 11:45

## 2020-01-22 RX ADMIN — PROPOFOL 80 MG: 10 INJECTION, EMULSION INTRAVENOUS at 11:58

## 2020-01-22 RX ADMIN — LIDOCAINE HYDROCHLORIDE 50 MG: 20 INJECTION, SOLUTION EPIDURAL; INFILTRATION; INTRACAUDAL; PERINEURAL at 11:58

## 2020-01-22 NOTE — PROGRESS NOTES
CRE balloon dilatation of the esophagus   12 mm Balloon inflated to 3 ATMs and held for 60 seconds. 13.5 mm Balloon inflated to 4.5 ATMs and held for 60 seconds. 15 mm Balloon inflated to 8 ATMs and held for 60 seconds. No subcutaneous crepitus of the chest or cervical region was noted post dilatation.

## 2020-01-22 NOTE — ANESTHESIA POSTPROCEDURE EVALUATION
Procedure(s):  ESOPHAGOGASTRODUODENOSCOPY (EGD)  ESOPHAGEAL DILATION  ESOPHAGOGASTRODUODENAL (EGD) BIOPSY. total IV anesthesia    Anesthesia Post Evaluation        Patient location during evaluation: PACU  Note status: Adequate. Level of consciousness: responsive to verbal stimuli and sleepy but conscious  Pain management: satisfactory to patient  Airway patency: patent  Anesthetic complications: no  Cardiovascular status: acceptable  Respiratory status: acceptable  Hydration status: acceptable  Comments: +Post-Anesthesia Evaluation and Assessment    Patient: Yulissa Melgar MRN: [id number]  SSN: [id number]   YOB: 1931  Age: 80 y.o. Sex: male      Cardiovascular Function/Vital Signs    BP (!) 175/91   Pulse (!) 51   Temp 36.6 °C (97.8 °F)   Resp 18   Ht 5' 7\" (1.702 m)   Wt 104.1 kg (229 lb 7 oz)   SpO2 100%   BMI 35.93 kg/m²     Patient is status post Procedure(s):  ESOPHAGOGASTRODUODENOSCOPY (EGD)  ESOPHAGEAL DILATION  ESOPHAGOGASTRODUODENAL (EGD) BIOPSY. Nausea/Vomiting: Controlled. Postoperative hydration reviewed and adequate. Pain:  Pain Scale 1: Numeric (0 - 10) (01/22/20 1132)  Pain Intensity 1: 0 (01/22/20 1132)   Managed. Neurological Status: At baseline. Mental Status and Level of Consciousness: Arousable. Pulmonary Status:   O2 Device: Room air (01/22/20 1211)   Adequate oxygenation and airway patent. Complications related to anesthesia: None    Post-anesthesia assessment completed. No concerns.     Signed By: Nieves Gomez MD    1/22/2020  Post anesthesia nausea and vomiting:  controlled      Vitals Value Taken Time   /91 1/22/2020 12:11 PM   Temp     Pulse 51 1/22/2020 12:11 PM   Resp 18 1/22/2020 12:11 PM   SpO2 100 % 1/22/2020 12:11 PM

## 2020-01-22 NOTE — ROUTINE PROCESS
Zelalem Kidney 10/3/1931 
[id number] Situation: 
Verbal report received from: Margaux Baeza, RN Procedure: Procedure(s): ESOPHAGOGASTRODUODENOSCOPY (EGD) ESOPHAGEAL DILATION 
ESOPHAGOGASTRODUODENAL (EGD) BIOPSY Background: 
 
Preoperative diagnosis: DYSPHAGIA 
ESOPHAGEAL STRICTURE Postoperative diagnosis: Gastritis, Esophageal Stricture :  Dr. Nataly Silver Assistant(s): Endoscopy RN-1: Nataliya Oseguera RN; Dexter Jama RN Specimens:  
ID Type Source Tests Collected by Time Destination 1 : Gastric bx Preservative Gastric  Mitra Joyce MD 1/22/2020 1200 Pathology H. Pylori  no Assessment: 
Intra-procedure medications Anesthesia gave intra-procedure sedation and medications, see anesthesia flow sheet yes Intravenous fluids: NS@ Charlie Organ Vital signs stable  yes Abdominal assessment: round and soft   yes Recommendation: 
Discharge patient per MD order  yes. Family or Jacque Mcclendon, son; [facility], daughter in law Permission to share finding with family or friend yes

## 2020-01-22 NOTE — ANESTHESIA PREPROCEDURE EVALUATION
Relevant Problems   No relevant active problems       Anesthetic History   No history of anesthetic complications            Review of Systems / Medical History  Patient summary reviewed, nursing notes reviewed and pertinent labs reviewed    Pulmonary  Within defined limits              Comments: Hx Lung cancer   Neuro/Psych             Comments: Radiculopathy Cardiovascular    Hypertension        Dysrhythmias   CAD and hyperlipidemia    Exercise tolerance: <4 METS  Comments: RBBB     GI/Hepatic/Renal     GERD    Renal disease: stones       Endo/Other        Morbid obesity and arthritis     Other Findings   Comments: DJD  Gout  BPH  Chronic back pain           Physical Exam    Airway  Mallampati: I    Neck ROM: normal range of motion   Mouth opening: Normal     Cardiovascular  Regular rate and rhythm,  S1 and S2 normal,  no murmur, click, rub, or gallop             Dental    Dentition: Full upper dentures     Pulmonary  Breath sounds clear to auscultation               Abdominal  GI exam deferred       Other Findings            Anesthetic Plan    ASA: 3  Anesthesia type: total IV anesthesia          Induction: Intravenous  Anesthetic plan and risks discussed with: Patient

## 2020-01-22 NOTE — DISCHARGE INSTRUCTIONS
Jennifer Zayas  [id number]  10/3/1931    EGD DISCHARGE INSTRUCTIONS  Discomfort:  Sore throat- throat lozenges or warm salt water gargle  redness at IV site- apply warm compress to area; if redness or soreness persist- contact your physician  Gaseous discomfort- walking, belching will help relieve any discomfort  You may not operate a vehicle for 12 hours  You may not engage in an occupation involving machinery or appliances for rest of today  You may not drink alcoholic beverages for at least 12 hours  Avoid making any critical decisions for at least 24 hour  DIET  You may resume your regular diet - however -  remember your colon is empty and a heavy meal will produce gas. Avoid these foods:  vegetables, fried / greasy foods, carbonated drinks    MEDICATIONS:  Per Medication Reconciliation      ACTIVITY  You may resume your normal daily activities until tomorrow AM;  Spend the remainder of the day resting -  avoid any strenuous activity. CALL M.D. ANY SIGN OF   Increasing pain, nausea, vomiting  Abdominal distension (swelling)  New increased bleeding (oral or rectal)  Fever (chills)  Pain in chest area  Bloody discharge from nose or mouth  Shortness of breath    You may not take any Advil, Aspirin, Ibuprofen, Motrin, Aleve, or Goodys for 10 days, ONLY  Tylenol as needed for pain. IMPRESSION:  Impression:    1. Normal proximal and mid esophagus  2. Benign distal esophageal stricture. Dilated with a 12-13. 5-15 mm balloon in sequence, held at 1 minute each  3. Moderate, patchy, non-erosive gastropathy in body and antrum. Biopsied  4. Stomach otherwise normal, including retroflexion  5. Normal duodenal bulb and 2nd portion of the duodenum    Recommendations:  1.  Follow up pathology    Follow-up Instructions:   Call Dr. Cox for the results of procedure / biopsy in 7-10 days   Telephone # [phone]    Imer Duvall MD

## 2020-01-22 NOTE — PROCEDURES
NAME:  Anita Srivastava   :   10/3/1931   MRN:   [id number]     Date/Time:  2020 12:11 PM    Esophagogastroduodenoscopy (EGD) Procedure Note    Procedure: Esophagogastroduodenoscopy with biopsy, esophageal dilation    Indication:  Dysphagia/odynophagia  Pre-operative Diagnosis: see indication above  Post-operative Diagnosis: see findings below  :  Melony Stephens MD  Referring Provider:   --Amanda Frederick MD    Exam:  Airway: clear, no airway problems anticipated  Heart: RRR, without gallops or rubs  Lungs: clear bilaterally without wheezes, crackles, or rhonchi  Abdomen: soft, nontender, nondistended, bowel sounds present  Mental Status: awake, alert and oriented to person, place and time     Anethesia/Sedation:  MAC anesthesia Propofol  Procedure Details   After informed consent was obtained for the procedure, with all risks and benefits of procedure explained the patient was taken to the endoscopy suite and placed in the left lateral decubitus position. Following sequential administration of sedation as per above, the [id number] gastroscope was inserted into the mouth and advanced under direct vision to second portion of the duodenum. A careful inspection was made as the gastroscope was withdrawn, including a retroflexed view of the proximal stomach; findings and interventions are described below. Findings:   1. Normal proximal and mid esophagus  2. Benign distal esophageal stricture. Dilated with a 12-13. 5-15 mm balloon in sequence, held at 1 minute each  3. Moderate, patchy, non-erosive gastropathy in body and antrum. Biopsied  4. Stomach otherwise normal, including retroflexion  5. Normal duodenal bulb and 2nd portion of the duodenum    Therapies:  1. Biopsies 2. Esophageal dilation    Specimens: 1. Gastric    EBL:  None. Complications:   None; patient tolerated the procedure well. Impression:    1. Normal proximal and mid esophagus  2.  Benign distal esophageal stricture. Dilated with a 12-13. 5-15 mm balloon in sequence, held at 1 minute each  3. Moderate, patchy, non-erosive gastropathy in body and antrum. Biopsied  4. Stomach otherwise normal, including retroflexion  5. Normal duodenal bulb and 2nd portion of the duodenum    Recommendations:  1.  Follow up pathology    Discharge disposition:  Home in the company of  when able to ambulate    Guicho Najera MD

## 2020-01-22 NOTE — PROGRESS NOTES
Anesthesia reports 150 mg Propofol, 50 mg Lidocaine and 250 mL NS given during procedure. Received report from anesthesia staff on vital signs and status of patient.

## 2021-04-26 ENCOUNTER — APPOINTMENT (OUTPATIENT)
Dept: ULTRASOUND IMAGING | Age: 86
End: 2021-04-26
Attending: EMERGENCY MEDICINE
Payer: MEDICARE

## 2021-04-26 ENCOUNTER — HOSPITAL ENCOUNTER (EMERGENCY)
Age: 86
Discharge: HOME | End: 2021-04-26
Attending: EMERGENCY MEDICINE
Payer: MEDICARE

## 2021-04-26 VITALS
SYSTOLIC BLOOD PRESSURE: 130 MMHG | HEART RATE: 75 BPM | OXYGEN SATURATION: 98 % | RESPIRATION RATE: 16 BRPM | TEMPERATURE: 98.7 F | HEIGHT: 67 IN | WEIGHT: 219.8 LBS | BODY MASS INDEX: 34.5 KG/M2 | DIASTOLIC BLOOD PRESSURE: 65 MMHG

## 2021-04-26 DIAGNOSIS — N45.1 ACUTE EPIDIDYMITIS: Primary | ICD-10-CM

## 2021-04-26 DIAGNOSIS — N30.01 ACUTE CYSTITIS WITH HEMATURIA: ICD-10-CM

## 2021-04-26 LAB
ALBUMIN SERPL-MCNC: 3.2 G/DL (ref 3.5–5)
ALBUMIN/GLOB SERPL: 0.8 {RATIO} (ref 1.1–2.2)
ALP SERPL-CCNC: 54 U/L (ref 45–117)
ALT SERPL-CCNC: 30 U/L (ref 12–78)
ANION GAP SERPL CALC-SCNC: 1 MMOL/L (ref 5–15)
APPEARANCE UR: CLEAR
AST SERPL-CCNC: 16 U/L (ref 15–37)
BACTERIA URNS QL MICRO: NEGATIVE /HPF
BASOPHILS # BLD: 0 K/UL (ref 0–0.1)
BASOPHILS NFR BLD: 0 % (ref 0–1)
BILIRUB SERPL-MCNC: 0.4 MG/DL (ref 0.2–1)
BILIRUB UR QL: NEGATIVE
BUN SERPL-MCNC: 23 MG/DL (ref 6–20)
BUN/CREAT SERPL: 21 (ref 12–20)
CALCIUM SERPL-MCNC: 9.5 MG/DL (ref 8.5–10.1)
CHLORIDE SERPL-SCNC: 103 MMOL/L (ref 97–108)
CO2 SERPL-SCNC: 32 MMOL/L (ref 21–32)
COLOR UR: ABNORMAL
CREAT SERPL-MCNC: 1.08 MG/DL (ref 0.7–1.3)
DIFFERENTIAL METHOD BLD: ABNORMAL
EOSINOPHIL # BLD: 0 K/UL (ref 0–0.4)
EOSINOPHIL NFR BLD: 0 % (ref 0–7)
EPITH CASTS URNS QL MICRO: ABNORMAL /LPF
ERYTHROCYTE [DISTWIDTH] IN BLOOD BY AUTOMATED COUNT: 14.9 % (ref 11.5–14.5)
GLOBULIN SER CALC-MCNC: 4.2 G/DL (ref 2–4)
GLUCOSE SERPL-MCNC: 110 MG/DL (ref 65–100)
GLUCOSE UR STRIP.AUTO-MCNC: NEGATIVE MG/DL
HCT VFR BLD AUTO: 49.3 % (ref 36.6–50.3)
HGB BLD-MCNC: 15.9 G/DL (ref 12.1–17)
HGB UR QL STRIP: ABNORMAL
HYALINE CASTS URNS QL MICRO: ABNORMAL /LPF (ref 0–5)
IMM GRANULOCYTES # BLD AUTO: 0.1 K/UL (ref 0–0.04)
IMM GRANULOCYTES NFR BLD AUTO: 1 % (ref 0–0.5)
KETONES UR QL STRIP.AUTO: NEGATIVE MG/DL
LEUKOCYTE ESTERASE UR QL STRIP.AUTO: ABNORMAL
LYMPHOCYTES # BLD: 1.3 K/UL (ref 0.8–3.5)
LYMPHOCYTES NFR BLD: 7 % (ref 12–49)
MCH RBC QN AUTO: 31.5 PG (ref 26–34)
MCHC RBC AUTO-ENTMCNC: 32.3 G/DL (ref 30–36.5)
MCV RBC AUTO: 97.8 FL (ref 80–99)
MONOCYTES # BLD: 1.6 K/UL (ref 0–1)
MONOCYTES NFR BLD: 9 % (ref 5–13)
NEUTS SEG # BLD: 14.7 K/UL (ref 1.8–8)
NEUTS SEG NFR BLD: 83 % (ref 32–75)
NITRITE UR QL STRIP.AUTO: NEGATIVE
NRBC # BLD: 0 K/UL (ref 0–0.01)
NRBC BLD-RTO: 0 PER 100 WBC
PH UR STRIP: 6 [PH] (ref 5–8)
PLATELET # BLD AUTO: 198 K/UL (ref 150–400)
PMV BLD AUTO: 9.9 FL (ref 8.9–12.9)
POTASSIUM SERPL-SCNC: 5.1 MMOL/L (ref 3.5–5.1)
PROT SERPL-MCNC: 7.4 G/DL (ref 6.4–8.2)
PROT UR STRIP-MCNC: NEGATIVE MG/DL
RBC # BLD AUTO: 5.04 M/UL (ref 4.1–5.7)
RBC #/AREA URNS HPF: ABNORMAL /HPF (ref 0–5)
SODIUM SERPL-SCNC: 136 MMOL/L (ref 136–145)
SP GR UR REFRACTOMETRY: 1.01 (ref 1–1.03)
UA: UC IF INDICATED,UAUC: ABNORMAL
UROBILINOGEN UR QL STRIP.AUTO: 0.2 EU/DL (ref 0.2–1)
WBC # BLD AUTO: 17.7 K/UL (ref 4.1–11.1)
WBC URNS QL MICRO: ABNORMAL /HPF (ref 0–4)

## 2021-04-26 PROCEDURE — 87077 CULTURE AEROBIC IDENTIFY: CPT

## 2021-04-26 PROCEDURE — 85025 COMPLETE CBC W/AUTO DIFF WBC: CPT

## 2021-04-26 PROCEDURE — 87086 URINE CULTURE/COLONY COUNT: CPT

## 2021-04-26 PROCEDURE — 99284 EMERGENCY DEPT VISIT MOD MDM: CPT

## 2021-04-26 PROCEDURE — 76870 US EXAM SCROTUM: CPT

## 2021-04-26 PROCEDURE — 87186 SC STD MICRODIL/AGAR DIL: CPT

## 2021-04-26 PROCEDURE — 96365 THER/PROPH/DIAG IV INF INIT: CPT

## 2021-04-26 PROCEDURE — 74011250636 HC RX REV CODE- 250/636: Performed by: EMERGENCY MEDICINE

## 2021-04-26 PROCEDURE — 36415 COLL VENOUS BLD VENIPUNCTURE: CPT

## 2021-04-26 PROCEDURE — 80053 COMPREHEN METABOLIC PANEL: CPT

## 2021-04-26 PROCEDURE — 74011000258 HC RX REV CODE- 258: Performed by: EMERGENCY MEDICINE

## 2021-04-26 PROCEDURE — 81001 URINALYSIS AUTO W/SCOPE: CPT

## 2021-04-26 RX ORDER — AMOXICILLIN AND CLAVULANATE POTASSIUM 875; 125 MG/1; MG/1
1 TABLET, FILM COATED ORAL 2 TIMES DAILY
Qty: 20 TAB | Refills: 0 | Status: SHIPPED | OUTPATIENT
Start: 2021-04-26 | End: 2021-09-30 | Stop reason: ALTCHOICE

## 2021-04-26 RX ADMIN — CEFTRIAXONE 1 G: 1 INJECTION, POWDER, FOR SOLUTION INTRAMUSCULAR; INTRAVENOUS at 13:14

## 2021-04-26 NOTE — ED NOTES
Ultrasound at bedside. 1615- Pt discharged by Bert Grace. Pt provided with discharge instructions Rx and instructions on follow up care. Pt out of ED ambulatory accompanied by family and ed staff.

## 2021-04-26 NOTE — ED PROVIDER NOTES
EMERGENCY DEPARTMENT HISTORY AND PHYSICAL EXAM      Date: 4/26/2021  Patient Name: Ana Kumar    History of Presenting Illness     Chief Complaint   Patient presents with    Groin Pain     Pt having pain and swelling to right groin since saturday but states it is getting worse since. Pt denies issues with urination. History Provided By: Patient    HPI: Ana Kumar, 80 y.o. male presents to the ED with cc of right groin pain. Patient states his symptoms started 2 days ago. He has no pain at rest but if he touches the area or moves in a certain way pain can be severe. He denies trauma, fever or chills. Denies dysuria or change in bowel habits. He denies chest pain, cough, discharge. He has chronic shortness of breath. There are no other complaints, changes, or physical findings at this time. PCP: Nash Valladares MD    No current facility-administered medications on file prior to encounter. Current Outpatient Medications on File Prior to Encounter   Medication Sig Dispense Refill    Saccharomyces boulardii (PROBIOTIC, S.BOULARDII,) 250 mg capsule Take 1 Cap by mouth two (2) times a day. 10 Cap 0    metoprolol succinate (TOPROL XL) 50 mg XL tablet Take 25 mg by mouth daily.  acetaminophen-codeine (TYLENOL-CODEINE #3) 300-30 mg per tablet Take 1 Tab by mouth every six (6) hours as needed for Pain.  acetaminophen (TYLENOL) 500 mg tablet Take 1,000 mg by mouth every six (6) hours as needed for Pain.  tamsulosin (FLOMAX) 0.4 mg capsule Take 0.4 mg by mouth daily.  enalapril (VASOTEC) 10 mg tablet Take 10 mg by mouth daily.          Past History     Past Medical History:  Past Medical History:   Diagnosis Date    Arthritis     CAD (coronary artery disease)     Chest pain, unspecified     Essential hypertension     Essential hypertension, benign     GERD (gastroesophageal reflux disease)     lower back    Hypertension     Lung cancer ([facility] [address]     Mixed hyperlipidemia     Recurrent kidney stones        Past Surgical History:  Past Surgical History:   Procedure Laterality Date    CHEST SURGERY PROCEDURE UNLISTED  9/3/13    left thoracoscopy; left upper lobectomy; left pericostal nerve block; lymph node dissection    HX APPENDECTOMY      HX HEART CATHETERIZATION      HX HEENT      tonsillectomy    HX ORTHOPAEDIC      back surgery x2 , right shoulder surgery    HX ORTHOPAEDIC      bilateral knee replacement    HX OTHER SURGICAL      neck or cancer removal       Family History:  Family History   Problem Relation Age of Onset    Diabetes Brother     Heart Disease Brother        Social History:  Social History     Tobacco Use    Smoking status: Former Smoker     Packs/day: 1.00     Years: 20.00     Pack years: 20.00     Quit date: 1985     Years since quittin.3    Smokeless tobacco: Never Used   Substance Use Topics    Alcohol use: No    Drug use: No       Allergies: Allergies   Allergen Reactions    Ciprofloxacin Swelling     In hands and feet    Other Medication Other (comments)     Pt states cough medicine afters his prostrate         Review of Systems   Review of Systems   Constitutional: Negative for fever. HENT: Negative for congestion. Eyes: Negative. Respiratory: Negative for shortness of breath. Cardiovascular: Negative for chest pain. Gastrointestinal: Negative for abdominal pain. Endocrine: Negative for heat intolerance. Genitourinary: Positive for scrotal swelling and testicular pain. Musculoskeletal: Negative for back pain. Skin: Negative for rash. Allergic/Immunologic: Negative for immunocompromised state. Neurological: Negative for dizziness. Hematological: Does not bruise/bleed easily. Psychiatric/Behavioral: Negative. All other systems reviewed and are negative. Physical Exam   Physical Exam  Vitals signs and nursing note reviewed.    Constitutional:       General: He is not in acute distress. Appearance: He is well-developed. HENT:      Head: Normocephalic and atraumatic. Neck:      Musculoskeletal: Normal range of motion. Cardiovascular:      Rate and Rhythm: Normal rate and regular rhythm. Heart sounds: Normal heart sounds. Pulmonary:      Effort: Pulmonary effort is normal.      Breath sounds: Normal breath sounds. Abdominal:      General: Bowel sounds are normal.      Palpations: Abdomen is soft. Genitourinary:     Comments: Scrotal edema, right greater than left testicular tenderness  Musculoskeletal: Normal range of motion. Skin:     General: Skin is warm and dry. Neurological:      General: No focal deficit present. Mental Status: He is alert and oriented to person, place, and time. Coordination: Coordination normal.   Psychiatric:         Mood and Affect: Mood normal.         Behavior: Behavior normal.         Diagnostic Study Results     Labs -     Recent Results (from the past 12 hour(s))   CBC WITH AUTOMATED DIFF    Collection Time: 04/26/21 12:00 PM   Result Value Ref Range    WBC 17.7 (H) 4.1 - 11.1 K/uL    RBC 5.04 4.10 - 5.70 M/uL    HGB 15.9 12.1 - 17.0 g/dL    HCT 49.3 36.6 - 50.3 %    MCV 97.8 80.0 - 99.0 FL    MCH 31.5 26.0 - 34.0 PG    MCHC 32.3 30.0 - 36.5 g/dL    RDW 14.9 (H) 11.5 - 14.5 %    PLATELET 277 604 - 885 K/uL    MPV 9.9 8.9 - 12.9 FL    NRBC 0.0 0  WBC    ABSOLUTE NRBC 0.00 0.00 - 0.01 K/uL    NEUTROPHILS 83 (H) 32 - 75 %    LYMPHOCYTES 7 (L) 12 - 49 %    MONOCYTES 9 5 - 13 %    EOSINOPHILS 0 0 - 7 %    BASOPHILS 0 0 - 1 %    IMMATURE GRANULOCYTES 1 (H) 0.0 - 0.5 %    ABS. NEUTROPHILS 14.7 (H) 1.8 - 8.0 K/UL    ABS. LYMPHOCYTES 1.3 0.8 - 3.5 K/UL    ABS. MONOCYTES 1.6 (H) 0.0 - 1.0 K/UL    ABS. EOSINOPHILS 0.0 0.0 - 0.4 K/UL    ABS. BASOPHILS 0.0 0.0 - 0.1 K/UL    ABS. IMM.  GRANS. 0.1 (H) 0.00 - 0.04 K/UL    DF AUTOMATED     METABOLIC PANEL, COMPREHENSIVE    Collection Time: 04/26/21 12:00 PM   Result Value Ref Range Sodium 136 136 - 145 mmol/L    Potassium 5.1 3.5 - 5.1 mmol/L    Chloride 103 97 - 108 mmol/L    CO2 32 21 - 32 mmol/L    Anion gap 1 (L) 5 - 15 mmol/L    Glucose 110 (H) 65 - 100 mg/dL    BUN 23 (H) 6 - 20 MG/DL    Creatinine 1.08 0.70 - 1.30 MG/DL    BUN/Creatinine ratio 21 (H) 12 - 20      GFR est AA >60 >60 ml/min/1.73m2    GFR est non-AA >60 >60 ml/min/1.73m2    Calcium 9.5 8.5 - 10.1 MG/DL    Bilirubin, total 0.4 0.2 - 1.0 MG/DL    ALT (SGPT) 30 12 - 78 U/L    AST (SGOT) 16 15 - 37 U/L    Alk. phosphatase 54 45 - 117 U/L    Protein, total 7.4 6.4 - 8.2 g/dL    Albumin 3.2 (L) 3.5 - 5.0 g/dL    Globulin 4.2 (H) 2.0 - 4.0 g/dL    A-G Ratio 0.8 (L) 1.1 - 2.2     URINALYSIS W/ REFLEX CULTURE    Collection Time: 04/26/21 12:00 PM    Specimen: Urine   Result Value Ref Range    Color YELLOW/STRAW      Appearance CLEAR CLEAR      Specific gravity 1.014 1.003 - 1.030      pH (UA) 6.0 5.0 - 8.0      Protein Negative NEG mg/dL    Glucose Negative NEG mg/dL    Ketone Negative NEG mg/dL    Bilirubin Negative NEG      Blood TRACE (A) NEG      Urobilinogen 0.2 0.2 - 1.0 EU/dL    Nitrites Negative NEG      Leukocyte Esterase LARGE (A) NEG      WBC  0 - 4 /hpf    RBC 0-5 0 - 5 /hpf    Epithelial cells FEW FEW /lpf    Bacteria Negative NEG /hpf    UA:UC IF INDICATED URINE CULTURE ORDERED (A) CNI      Hyaline cast 0-2 0 - 5 /lpf       Radiologic Studies -   US SCROTUM/TESTICLES   Final Result      Complex right hydrocele. .   Right epididymitis   Left epididymal cyst.   Secondary evidence for prior left-sided epididymitis. CT Results  (Last 48 hours)    None        CXR Results  (Last 48 hours)    None          Medical Decision Making   I am the first provider for this patient. I reviewed the vital signs, available nursing notes, past medical history, past surgical history, family history and social history. Vital Signs-Reviewed the patient's vital signs.   Patient Vitals for the past 12 hrs:   Temp Pulse Resp BP SpO2   04/26/21 1131 98.2 °F (36.8 °C) 72 15 (!) 140/75 97 %         Records Reviewed: Nursing Notes, Old Medical Records, Previous Radiology Studies and Previous Laboratory Studies    Provider Notes (Medical Decision Making):   Epididymitis, cellulitis, hernia, UTI    ED Course:   Initial assessment performed. The patients presenting problems have been discussed, and they are in agreement with the care plan formulated and outlined with them. I have encouraged them to ask questions as they arise throughout their visit. consult note: The patient has an allergy to Cipro and is not sure whether he has ever had Levaquin. I discussed the case with Sandro from pharmacy. He states that Bactrim and Augmentin were reasonable alternatives. The patient is also on enalapril, which interacts with Bactrim. Progress note:    Patient is feeling better. His results were reviewed. He is advised to follow-up and return to ER if worse           Critical Care Time:   0    Disposition:  home    DISCHARGE PLAN:  1. Discharge Medication List as of 4/26/2021  3:39 PM      START taking these medications    Details   amoxicillin-clavulanate (Augmentin) 875-125 mg per tablet Take 1 Tab by mouth two (2) times a day., Normal, Disp-20 Tab, R-0         CONTINUE these medications which have NOT CHANGED    Details   Saccharomyces boulardii (PROBIOTIC, S.BOULARDII,) 250 mg capsule Take 1 Cap by mouth two (2) times a day., OTC, Disp-10 Cap, R-0      metoprolol succinate (TOPROL XL) 50 mg XL tablet Take 25 mg by mouth daily. , Historical Med      acetaminophen-codeine (TYLENOL-CODEINE #3) 300-30 mg per tablet Take 1 Tab by mouth every six (6) hours as needed for Pain., Historical Med      acetaminophen (TYLENOL) 500 mg tablet Take 1,000 mg by mouth every six (6) hours as needed for Pain., Historical Med      tamsulosin (FLOMAX) 0.4 mg capsule Take 0.4 mg by mouth daily. , Historical Med      enalapril (VASOTEC) 10 mg tablet Take 10 mg by mouth daily. , Historical Med           2. Follow-up Information     Follow up With Specialties Details Why [address], [address] Jaeger MD Internal Medicine  As needed [place]  [address] [address] [facility] [address] [phone]        In 2 days As needed Call your urologist    [facility] EMERGENCY DEPT Emergency Medicine  If symptoms worsen [address]  [address]  [phone]        3. Return to ED if worse     Diagnosis     Clinical Impression:   1. Acute epididymitis    2. Acute cystitis with hematuria        Attestations:    Allyssa Salazar MD    Please note that this dictation was completed with DotBlu, the computer voice recognition software. Quite often unanticipated grammatical, syntax, homophones, and other interpretive errors are inadvertently transcribed by the computer software. Please disregard these errors. Please excuse any errors that have escaped final proofreading. Thank you.

## 2021-04-28 LAB
BACTERIA SPEC CULT: ABNORMAL
CC UR VC: ABNORMAL
SERVICE CMNT-IMP: ABNORMAL

## 2021-09-30 ENCOUNTER — OFFICE VISIT (OUTPATIENT)
Dept: CARDIOLOGY CLINIC | Age: 86
End: 2021-09-30
Payer: MEDICARE

## 2021-09-30 VITALS
RESPIRATION RATE: 16 BRPM | WEIGHT: 225 LBS | HEIGHT: 67 IN | OXYGEN SATURATION: 96 % | HEART RATE: 74 BPM | BODY MASS INDEX: 35.31 KG/M2 | SYSTOLIC BLOOD PRESSURE: 130 MMHG | DIASTOLIC BLOOD PRESSURE: 62 MMHG

## 2021-09-30 DIAGNOSIS — I10 ESSENTIAL HYPERTENSION: ICD-10-CM

## 2021-09-30 DIAGNOSIS — E78.2 MIXED HYPERLIPIDEMIA: ICD-10-CM

## 2021-09-30 DIAGNOSIS — E11.59 TYPE 2 DIABETES MELLITUS WITH OTHER CIRCULATORY COMPLICATION, WITHOUT LONG-TERM CURRENT USE OF INSULIN (HCC): ICD-10-CM

## 2021-09-30 DIAGNOSIS — I25.118 CORONARY ARTERY DISEASE OF NATIVE ARTERY OF NATIVE HEART WITH STABLE ANGINA PECTORIS (HCC): Primary | ICD-10-CM

## 2021-09-30 PROCEDURE — 1101F PT FALLS ASSESS-DOCD LE1/YR: CPT | Performed by: INTERNAL MEDICINE

## 2021-09-30 PROCEDURE — G8427 DOCREV CUR MEDS BY ELIG CLIN: HCPCS | Performed by: INTERNAL MEDICINE

## 2021-09-30 PROCEDURE — 93010 ELECTROCARDIOGRAM REPORT: CPT | Performed by: INTERNAL MEDICINE

## 2021-09-30 PROCEDURE — G8536 NO DOC ELDER MAL SCRN: HCPCS | Performed by: INTERNAL MEDICINE

## 2021-09-30 PROCEDURE — 93005 ELECTROCARDIOGRAM TRACING: CPT | Performed by: INTERNAL MEDICINE

## 2021-09-30 PROCEDURE — G0463 HOSPITAL OUTPT CLINIC VISIT: HCPCS | Performed by: INTERNAL MEDICINE

## 2021-09-30 PROCEDURE — G8510 SCR DEP NEG, NO PLAN REQD: HCPCS | Performed by: INTERNAL MEDICINE

## 2021-09-30 PROCEDURE — 99204 OFFICE O/P NEW MOD 45 MIN: CPT | Performed by: INTERNAL MEDICINE

## 2021-09-30 PROCEDURE — G8417 CALC BMI ABV UP PARAM F/U: HCPCS | Performed by: INTERNAL MEDICINE

## 2021-09-30 RX ORDER — INDOMETHACIN 50 MG/1
CAPSULE ORAL
COMMUNITY
Start: 2021-07-22 | End: 2022-04-01

## 2021-09-30 RX ORDER — NITROGLYCERIN 0.4 MG/1
TABLET SUBLINGUAL
COMMUNITY
Start: 2021-09-23

## 2021-09-30 RX ORDER — ISOSORBIDE MONONITRATE 30 MG/1
30 TABLET, EXTENDED RELEASE ORAL DAILY
Qty: 90 TABLET | Refills: 1 | Status: SHIPPED | OUTPATIENT
Start: 2021-09-30 | End: 2021-10-11 | Stop reason: SINTOL

## 2021-09-30 RX ORDER — MAG HYDROX/ALUMINUM HYD/SIMETH 400-400-40
450 SUSPENSION, ORAL (FINAL DOSE FORM) ORAL 2 TIMES DAILY
COMMUNITY

## 2021-09-30 RX ORDER — ATORVASTATIN CALCIUM 20 MG/1
20 TABLET, FILM COATED ORAL DAILY
Qty: 90 TABLET | Refills: 3 | Status: SHIPPED | OUTPATIENT
Start: 2021-09-30 | End: 2021-10-11 | Stop reason: SINTOL

## 2021-09-30 NOTE — PROGRESS NOTES
Staci So NP         Subjective/HPI:     Alicia Esquivel is a 80 y.o. male is here for new patient consultation. The patient has medical hx significant for CAD with cardiac cath in 2012  RCA, EF 60%, remote MI, DVT, lung cancer, and RBBB. He was seen by his PCP on 9/23/21 with c/o chest tightness and sob that started after he began taking Proscar. He reports that the chest tightness was related to meals often. He was told by PCP to stop Proscar which he did a week ago, symptoms have eased off. Not resolved. He reports prior to starting med 30 days ago he was not having these symptoms. He denies orthopnea, PND, or edema. Denies palpitations or lightheadedness. Family med hx significant for mother with CAD. Current Outpatient Medications   Medication Sig    saw palmetto 450 mg cap Take 450 mg by mouth three (3) times daily.  nitroglycerin (NITROSTAT) 0.4 mg SL tablet TAKE ONE TABLET EVERY 5 MIN FOR CHEST PAIN X 3    indomethacin (INDOCIN) 50 mg capsule TAKE 1 CAPSULE BY MOUTH 3 TIMES A DAY AS NEEDED **TAKE WITH FOOD OR MILK    metoprolol succinate (TOPROL XL) 50 mg XL tablet Take 25 mg by mouth daily.  acetaminophen-codeine (TYLENOL-CODEINE #3) 300-30 mg per tablet Take 1 Tab by mouth every six (6) hours as needed for Pain.  acetaminophen (TYLENOL) 500 mg tablet Take 1,000 mg by mouth every six (6) hours as needed for Pain.  tamsulosin (FLOMAX) 0.4 mg capsule Take 0.4 mg by mouth daily.  enalapril (VASOTEC) 10 mg tablet Take 10 mg by mouth daily.  Saccharomyces boulardii (PROBIOTIC, S.BOULARDII,) 250 mg capsule Take 1 Cap by mouth two (2) times a day. (Patient not taking: Reported on 9/30/2021)     No current facility-administered medications for this visit.       Vitals:    09/30/21 1255   BP: 130/62   Pulse: 74   Resp: 16   SpO2: 96%   Weight: 225 lb (102.1 kg)   Height: 5' 7\" (1.702 m)       I have reviewed the nurses notes, vitals, problem list, allergy list, medical history, family, social history and medications. Review of Symptoms:  General: Pt denies excessive weight gain or loss. Pt is able to conduct ADL's  HEENT: Denies blurred vision, headaches, epistaxis and difficulty swallowing. Respiratory: Denies shortness of breath, +WOLF, denies wheezing or stridor. Cardiovascular: + precordial pain,denies palpitations, edema or PND  Gastrointestinal: Denies poor appetite, indigestion, abdominal pain or blood in stool  Urinary: Denies dysuria, pyuria  Musculoskeletal: Denies pain or swelling from muscles or joints  Neurologic: Denies tremor, paresthesias, or sensory motor disturbance  Skin: Denies rash, itching or texture change. Psych: Denies depression        Physical Exam:      General: Well developed, cooperative, alert in no acute distress, appears states age. HEENT: Supple, No carotid bruits, no JVD, trach is midline. PERRL, EOM intact  Heart:  Normal S1/S2 negative S3 or S4. Regular, no murmur, gallop or rub. Respiratory: Clear bilaterally x 4, no wheezing or rales  Abdomen:   Soft, non-tender, no masses, bowel sounds are active. Extremities:  No edema, normal cap refill, no cyanosis, atraumatic. Neuro: A&Ox3, speech clear, gait stable. Skin: Skin color is normal. No rashes or lesions. Non diaphoretic  Vascular: 2+ pulses symmetric in all extremities    Cardiographics    ECG: Sinus  Rhythm HR 69  -Right bundle branch block with left axis -bifascicular block. 1/12/12  Cardiac cath   RCA over ~ 30-40 mm in mid. Antegrade = no stump; prox RCA goes smoothly into marginal.  Retrograde possibilities via septal perforators.   Small area of inferobasal akinesis; this does not appear to include the entire distal RCA distribution.      Assessment:        Specialty Problems        Cardiology Problems    Chronic ischemic heart disease, unspecified        Coronary atherosclerosis of native coronary artery-- RCA with L>R collaterals 1/2012        Right bundle branch block/EZEQUIEL--bifascicular heart block        DVT of lower extremity (deep venous thrombosis) ([facility] [address]              ICD-10-CM ICD-9-CM    1. Coronary artery disease of native artery of native heart with stable angina pectoris (HCC)  I25.118 414.01 AMB POC EKG ROUTINE W/ 12 LEADS, INTER & REP     413.9    2. Essential hypertension  I10 401.9    3. Mixed hyperlipidemia  E78.2 272.2    4. Type 2 diabetes mellitus with other circulatory complication, without long-term current use of insulin (HCC)  E11.59 250.70          PLAN:  1. Coronary artery disease  remote hx of MI 1985. Cardiac cath in 2012  of RCA. 8/2013 Lexiscan stress test no changes. Reports onset about 30 days ago of SOB and chest tightness often around meal time. [place] was r/t Proscar. Stopped med about a week ago, easing off some, but still present. ECG SR with RBBB. With known CAD recommend lexiscan stress test. Cont BB, not taking ASA d/t bleeding hx on ASA. Start Imdur 30mg. 2. HTN  At goal on current medications    3. Hyperlipidemia  Managed by PCP. Not on statin. Start Atorvastatin 20mg daily. 4. DM  Increases risk of CAD progression    Deann Le NP    Patient was made aware during visit today that all testing completed would be instantaneously available on their MyChart for review. Discussed that these results will be made available to the provider at the same time. They were advised to wait at least 3 business days to allow for provider's interpretation of results with follow-up before calling our office with concerns about their results. Patient seen and examined by me with nurse practitioner. I personally performed all components of the history, physical, and medical decision making and agree with the assessment and plan as noted. Previous cath noted. Check ayah and Echo. Add imdur and statin. H/o nose bleeding with aspirin. Further recommendations based upon test results.      Kaila Smith MD

## 2021-09-30 NOTE — PROGRESS NOTES
1. Have you been to the ER, urgent care clinic since your last visit? Hospitalized since your last visit? No.    2. Have you seen or consulted any other health care providers outside of the [address] [place] [address] since your last visit? Include any pap smears or colon screening.    No.        Chief Complaint   Patient presents with    New Patient     Abn EKG- chest tightness and sob

## 2021-10-01 ENCOUNTER — TELEPHONE (OUTPATIENT)
Dept: CARDIOLOGY CLINIC | Age: 86
End: 2021-10-01

## 2021-10-01 NOTE — TELEPHONE ENCOUNTER
Message  Received: Today  Angélica Mcnally MD sent to Ronda Perez LPN  Caller: Unspecified (Today,  3:47 PM)  Not very commonly seen with lipitor. He can hold lipitor, but needs to see urology, could be progression of prostate issues. Called pt,verified pt with two pt identifiers, advised pt this is not commonly seen with lipitor. Advised he can continue to hold his Lipitor but f/u with Urology for progression of prostate issues. Advised again of ER for urination issues, no fever pt stated. Pt verbalized understanding.

## 2021-10-01 NOTE — TELEPHONE ENCOUNTER
Pt called in,verified pt with two pt identifiers, pt advised he started his 2 meds this am the Lipitor and Imdur. After taking the Lipitor he noticed 2 hours after taking that he could not go to bathroom. He notes on side effect list for Lipitor it says can cause kidney, urine issues. He is drinking plenty of water. He goes to bathroom but very little is coming out, no burning. He had pains in lower stomach but that is better. Pt has know prostate issues but hasn't had any issues recently. I advised pt to stop the Lipitor, take the other med but stop the Lipitor for now. Advised to continue to drink water but if he does not pass any urine, pain comes back in his stomach then he needs to go to ER. Advised this might not be related to the med at all but to something else. Advised pt I would send message to  but since it is the weekend I might not hear from him until Monday. Advised again to stop Lipitor, go to ER for any concerning symptoms. Pt verbalized understanding.

## 2021-10-04 ENCOUNTER — TELEPHONE (OUTPATIENT)
Dept: CARDIOLOGY CLINIC | Age: 86
End: 2021-10-04

## 2021-10-04 NOTE — TELEPHONE ENCOUNTER
Pt would like to talk to someone about his medication ,isosorbide 30 mg pt was having a fast heartbeat and wants to know if he can take 1/2 of the pill or does he continue to take the whole pill.  Please call him back at [phone]    Thanks Violet

## 2021-10-04 NOTE — TELEPHONE ENCOUNTER
Spoke with patient. Verified patient with two patient identifiers. Gave me verbal permission to speak with daughter-in-law, Winifred Sierra about everything, ph # [phone]. .    States since starting on Imdur he has felt lethargic, and felt his heart beating strongly a couple of times, not fast.  Has a stress test scheduled this week. Pt feels his prostate med were the cause of his CP but will proceed with his EST. Asking if he could decrease the Imdur 30 mg to half tab or 15 mg every day to decrease the lethergy. Advised I did not know if this is a side effect of Imdur, will discuss with NP. Does give him a mild headache.    ? Decrease Imdur to 15 mg every day or leave at 30 mg every day? Pls advivse.

## 2021-10-04 NOTE — TELEPHONE ENCOUNTER
Spoke with Andriy Khalil, have verbal permission. Verified patient with two patient identifiers. Advised to decrease Imdur 30 mg to half tab or 15 mg every day. Check BP, keep diary. Call if trending too low. Can try stopping to see if symptoms resolve. Continue with HALEY Khalil verbalized understanding.

## 2021-10-04 NOTE — TELEPHONE ENCOUNTER
Yes try taking 1/2 tablet of Imdur. Can they check BP? May be related to a lower BP as well. If not improved with reduction can try stopping to see if resolves.  Agree with plans to cont stress test

## 2021-10-06 ENCOUNTER — ANCILLARY PROCEDURE (OUTPATIENT)
Dept: CARDIOLOGY CLINIC | Age: 86
End: 2021-10-06
Payer: MEDICARE

## 2021-10-06 VITALS
WEIGHT: 225 LBS | BODY MASS INDEX: 35.31 KG/M2 | SYSTOLIC BLOOD PRESSURE: 150 MMHG | HEIGHT: 67 IN | DIASTOLIC BLOOD PRESSURE: 70 MMHG

## 2021-10-06 DIAGNOSIS — E78.2 MIXED HYPERLIPIDEMIA: ICD-10-CM

## 2021-10-06 DIAGNOSIS — E11.59 TYPE 2 DIABETES MELLITUS WITH OTHER CIRCULATORY COMPLICATION, WITHOUT LONG-TERM CURRENT USE OF INSULIN (HCC): ICD-10-CM

## 2021-10-06 DIAGNOSIS — I10 ESSENTIAL HYPERTENSION: ICD-10-CM

## 2021-10-06 DIAGNOSIS — I25.118 CORONARY ARTERY DISEASE OF NATIVE ARTERY OF NATIVE HEART WITH STABLE ANGINA PECTORIS (HCC): ICD-10-CM

## 2021-10-06 LAB
STRESS BASELINE DIAS BP: 70 MMHG
STRESS BASELINE HR: 65 BPM
STRESS BASELINE SYS BP: 150 MMHG
STRESS PEAK DIAS BP: 70 MMHG
STRESS PEAK SYS BP: 150 MMHG
STRESS PERCENT HR ACHIEVED: 73 %
STRESS POST PEAK HR: 95 BPM
STRESS RATE PRESSURE PRODUCT: NORMAL BPM*MMHG
STRESS ST DEPRESSION: 0 MM
STRESS ST ELEVATION: 0 MM
STRESS TARGET HR: 130 BPM

## 2021-10-06 PROCEDURE — 78452 HT MUSCLE IMAGE SPECT MULT: CPT | Performed by: INTERNAL MEDICINE

## 2021-10-06 PROCEDURE — 93017 CV STRESS TEST TRACING ONLY: CPT | Performed by: INTERNAL MEDICINE

## 2021-10-06 PROCEDURE — A9502 TC99M TETROFOSMIN: HCPCS | Performed by: INTERNAL MEDICINE

## 2021-10-06 PROCEDURE — 93018 CV STRESS TEST I&R ONLY: CPT | Performed by: INTERNAL MEDICINE

## 2021-10-06 PROCEDURE — 93016 CV STRESS TEST SUPVJ ONLY: CPT | Performed by: INTERNAL MEDICINE

## 2021-10-06 RX ADMIN — REGADENOSON 0.4 MG: 0.08 INJECTION, SOLUTION INTRAVENOUS at 09:35

## 2021-10-06 RX ADMIN — TETROFOSMIN 10 MILLICURIE: 1.38 INJECTION, POWDER, LYOPHILIZED, FOR SOLUTION INTRAVENOUS at 08:45

## 2021-10-06 RX ADMIN — TETROFOSMIN 32.3 MILLICURIE: 1.38 INJECTION, POWDER, LYOPHILIZED, FOR SOLUTION INTRAVENOUS at 09:35

## 2021-10-06 NOTE — PROGRESS NOTES
Please let pt know his stress test is indicating potential blood flow issues to his heart. Dr Anoop Guadarrama wants him to come in to discuss results.

## 2021-10-07 ENCOUNTER — TELEPHONE (OUTPATIENT)
Dept: CARDIOLOGY CLINIC | Age: 86
End: 2021-10-07

## 2021-10-07 NOTE — TELEPHONE ENCOUNTER
----- Message from Perez Arana NP sent at 10/6/2021 12:54 PM EDT -----  Please let pt know his stress test is indicating potential blood flow issues to his heart. Dr Karuna Kee wants him to come in to discuss results. Called pt,verified pt with two pt identifiers, advised pt his stress test is indicating potential blood flow issues to his heart.  will need to discuss further with him in office. He ask that we call his son Pearson Peabody at [phone] to schedule his appt. I advised someone will reach out to his son to schedule his appt and yes his son can come with him. Pt verbalized understanding.

## 2021-10-07 NOTE — TELEPHONE ENCOUNTER
Message  Received: Today  Maryellen Valdovinos sent to Bruce Katz LPN  Caller: Unspecified (Today,  8:17 AM)  Scheduled 10/11 at 9am         Noted, thanks.

## 2021-10-11 ENCOUNTER — OFFICE VISIT (OUTPATIENT)
Dept: CARDIOLOGY CLINIC | Age: 86
End: 2021-10-11
Payer: MEDICARE

## 2021-10-11 VITALS
WEIGHT: 225.1 LBS | HEIGHT: 67 IN | DIASTOLIC BLOOD PRESSURE: 68 MMHG | OXYGEN SATURATION: 97 % | BODY MASS INDEX: 35.33 KG/M2 | HEART RATE: 73 BPM | SYSTOLIC BLOOD PRESSURE: 122 MMHG | RESPIRATION RATE: 17 BRPM

## 2021-10-11 DIAGNOSIS — I10 ESSENTIAL HYPERTENSION: ICD-10-CM

## 2021-10-11 DIAGNOSIS — I25.118 CORONARY ARTERY DISEASE OF NATIVE ARTERY OF NATIVE HEART WITH STABLE ANGINA PECTORIS (HCC): Primary | ICD-10-CM

## 2021-10-11 DIAGNOSIS — E11.59 TYPE 2 DIABETES MELLITUS WITH OTHER CIRCULATORY COMPLICATION, WITHOUT LONG-TERM CURRENT USE OF INSULIN (HCC): ICD-10-CM

## 2021-10-11 DIAGNOSIS — E78.2 MIXED HYPERLIPIDEMIA: ICD-10-CM

## 2021-10-11 PROCEDURE — G8536 NO DOC ELDER MAL SCRN: HCPCS | Performed by: INTERNAL MEDICINE

## 2021-10-11 PROCEDURE — G8427 DOCREV CUR MEDS BY ELIG CLIN: HCPCS | Performed by: INTERNAL MEDICINE

## 2021-10-11 PROCEDURE — G8510 SCR DEP NEG, NO PLAN REQD: HCPCS | Performed by: INTERNAL MEDICINE

## 2021-10-11 PROCEDURE — 99214 OFFICE O/P EST MOD 30 MIN: CPT | Performed by: INTERNAL MEDICINE

## 2021-10-11 PROCEDURE — 1101F PT FALLS ASSESS-DOCD LE1/YR: CPT | Performed by: INTERNAL MEDICINE

## 2021-10-11 PROCEDURE — G8417 CALC BMI ABV UP PARAM F/U: HCPCS | Performed by: INTERNAL MEDICINE

## 2021-10-11 PROCEDURE — G0463 HOSPITAL OUTPT CLINIC VISIT: HCPCS | Performed by: INTERNAL MEDICINE

## 2021-10-11 NOTE — PROGRESS NOTES
1. Have you been to the ER, urgent care clinic since your last visit? Hospitalized since your last visit? No.    2. Have you seen or consulted any other health care providers outside of the [address] [place] [address] since your last visit? Include any pap smears or colon screening.    No.        Chief Complaint   Patient presents with    Results     discuss stress test results-Imdur making him nausea-Lipitor caused urinary issues-stopped Metoprolol for 2 days

## 2021-10-11 NOTE — PROGRESS NOTES
Facundo Ochoa NP         Subjective/HPI:     Eriberto Jo is a 80 y.o. male is here for a f/u appt. The patient has medical hx significant for CAD with cardiac cath in 2012  RCA, EF 60%, remote MI, DVT, lung cancer, and RBBB. He was seen in our office 9/30/21 reporting c/o chest tightness and sob. We started him on Imdur and obtained a NST which is indicating reversible ischemia. He reports about an hour after taking Imdur he is feeling lightheaded and can feel his heart pumping, tried reducing dose to 15mg and still having issue. He was having difficulty passing urine on statin, d/c med and this resolved. He is still having some chest tightness/sob. Current Outpatient Medications   Medication Sig    saw palmetto 450 mg cap Take 450 mg by mouth three (3) times daily.  nitroglycerin (NITROSTAT) 0.4 mg SL tablet TAKE ONE TABLET EVERY 5 MIN FOR CHEST PAIN X 3    indomethacin (INDOCIN) 50 mg capsule TAKE 1 CAPSULE BY MOUTH 3 TIMES A DAY AS NEEDED **TAKE WITH FOOD OR MILK    isosorbide mononitrate ER (IMDUR) 30 mg tablet Take 1 Tablet by mouth daily. To help prevent chest pain- call MD if significant headache (Patient taking differently: Take 15 mg by mouth daily. To help prevent chest pain- call MD if significant headache)    acetaminophen-codeine (TYLENOL-CODEINE #3) 300-30 mg per tablet Take 1 Tab by mouth every six (6) hours as needed for Pain.  acetaminophen (TYLENOL) 500 mg tablet Take 1,000 mg by mouth every six (6) hours as needed for Pain.  tamsulosin (FLOMAX) 0.4 mg capsule Take 0.4 mg by mouth daily.  enalapril (VASOTEC) 10 mg tablet Take 10 mg by mouth daily.  atorvastatin (LIPITOR) 20 mg tablet Take 1 Tablet by mouth daily. (Patient not taking: Reported on 10/11/2021)    Saccharomyces boulardii (PROBIOTIC, S.BOULARDII,) 250 mg capsule Take 1 Cap by mouth two (2) times a day.  (Patient not taking: Reported on 9/30/2021)    metoprolol succinate (TOPROL XL) 50 mg XL tablet Take 25 mg by mouth daily. (Patient not taking: Reported on 10/11/2021)     No current facility-administered medications for this visit. Vitals:    10/11/21 0851   Weight: 225 lb 1.6 oz (102.1 kg)   Height: 5' 7\" (1.702 m)       I have reviewed the nurses notes, vitals, problem list, allergy list, medical history, family, social history and medications. Review of Symptoms:  General: Pt denies excessive weight gain or loss. Pt is able to conduct ADL's  HEENT: Denies blurred vision, headaches, epistaxis and difficulty swallowing. Respiratory: Denies shortness of breath, +WOLF, denies wheezing or stridor. Cardiovascular: + precordial pain,denies palpitations, edema or PND  Gastrointestinal: Denies poor appetite, indigestion, abdominal pain or blood in stool  Urinary: Denies dysuria, pyuria  Musculoskeletal: Denies pain or swelling from muscles or joints  Neurologic: Denies tremor, paresthesias, or sensory motor disturbance  Skin: Denies rash, itching or texture change. Psych: Denies depression        Physical Exam:      General: Well developed, cooperative, alert in no acute distress, appears states age. HEENT: Supple, No carotid bruits, no JVD, trach is midline. PERRL, EOM intact  Heart:  Normal S1/S2 negative S3 or S4. Regular, no murmur, gallop or rub. Respiratory: Clear bilaterally x 4, no wheezing or rales  Abdomen:   Soft, non-tender, no masses, bowel sounds are active. Extremities:  No edema, normal cap refill, no cyanosis, atraumatic. Neuro: A&Ox3, speech clear, gait stable. Skin: Skin color is normal. No rashes or lesions. Non diaphoretic  Vascular: 2+ pulses symmetric in all extremities    Cardiographics      1/12/12  Cardiac cath   RCA over ~ 30-40 mm in mid. Antegrade = no stump; prox RCA goes smoothly into marginal.  Retrograde possibilities via septal perforators.   Small area of inferobasal akinesis; this does not appear to include the entire distal RCA distribution.        Assessment: Specialty Problems        Cardiology Problems    Chronic ischemic heart disease, unspecified        Coronary atherosclerosis of native coronary artery-- RCA with L>R collaterals 1/2012        Right bundle branch block/EZEQUIEL--bifascicular heart block        DVT of lower extremity (deep venous thrombosis) ([facility]              ICD-10-CM ICD-9-CM    1. Coronary artery disease of native artery of native heart with stable angina pectoris ([facility]  I25.118 414.01      413.9    2. Essential hypertension  I10 401.9    3. Mixed hyperlipidemia  E78.2 272.2    4. Type 2 diabetes mellitus with other circulatory complication, without long-term current use of insulin (HCC)  E11.59 250.70          PLAN:  1. Coronary artery disease  remote hx of MI 1985. Cardiac cath in 2012  of RCA. 8/2013 Lexiscan stress test no changes. Reported SOB and chest tightness at his appt 9/30/21. We started Imdur and obtain stress test 10/6/21 showing basal-mid segment of the lateral wall that is reversible. He is unable to tolerate Imdur, likely hypotensive. He is a candidate for a cardiac cath. Discussed risk vs benefits and he agrees to proceed. Cont BB, he reports he is not taking ASA d/t nose bleeds on ASA. 2. HTN  At goal on current medications    3. Hyperlipidemia  Managed by PCP. Not on statin. We started Atorvastatin 20mg daily at his appt 9/30, he had urinary retention on statin. Hold on any meds at this time. 4. DM  Increases risk of CAD progression    Ana Coronado NP    Patient was made aware during visit today that all testing completed would be instantaneously available on their MyChart for review. Discussed that these results will be made available to the provider at the same time. They were advised to wait at least 3 business days to allow for provider's interpretation of results with follow-up before calling our office with concerns about their results. Patient seen and examined by me with nurse practitioner.   I personally performed all components of the history, physical, and medical decision making and agree with the assessment and plan as noted. Intolerant to statin and imdur. Stress test reviewed with patient and family. H/o nose bleed with aspirin. Need for DAPT after PCI d/w patient. He is not sure about cardiac cath. Diagnostic option was also discussed. He is going to start low dose aspirin and think about cardiac cath. He will call back with update and his final decision. Continue other meds.      Marina Kwok MD

## 2022-01-04 ENCOUNTER — APPOINTMENT (OUTPATIENT)
Dept: GENERAL RADIOLOGY | Age: 87
End: 2022-01-04
Attending: EMERGENCY MEDICINE
Payer: MEDICARE

## 2022-01-04 ENCOUNTER — APPOINTMENT (OUTPATIENT)
Dept: CT IMAGING | Age: 87
End: 2022-01-04
Attending: EMERGENCY MEDICINE
Payer: MEDICARE

## 2022-01-04 ENCOUNTER — HOSPITAL ENCOUNTER (EMERGENCY)
Age: 87
Discharge: HOME | End: 2022-01-04
Attending: EMERGENCY MEDICINE
Payer: MEDICARE

## 2022-01-04 VITALS
HEIGHT: 67 IN | SYSTOLIC BLOOD PRESSURE: 157 MMHG | HEART RATE: 79 BPM | WEIGHT: 231.92 LBS | BODY MASS INDEX: 36.4 KG/M2 | TEMPERATURE: 98.6 F | OXYGEN SATURATION: 94 % | RESPIRATION RATE: 19 BRPM | DIASTOLIC BLOOD PRESSURE: 89 MMHG

## 2022-01-04 DIAGNOSIS — J20.9 ACUTE BRONCHITIS, UNSPECIFIED ORGANISM: Primary | ICD-10-CM

## 2022-01-04 LAB
ALBUMIN SERPL-MCNC: 3 G/DL (ref 3.5–5)
ALBUMIN/GLOB SERPL: 0.7 {RATIO} (ref 1.1–2.2)
ALP SERPL-CCNC: 56 U/L (ref 45–117)
ALT SERPL-CCNC: 22 U/L (ref 12–78)
ANION GAP SERPL CALC-SCNC: 4 MMOL/L (ref 5–15)
AST SERPL-CCNC: 16 U/L (ref 15–37)
BASOPHILS # BLD: 0 K/UL (ref 0–0.1)
BASOPHILS NFR BLD: 0 % (ref 0–1)
BILIRUB SERPL-MCNC: 0.4 MG/DL (ref 0.2–1)
BUN SERPL-MCNC: 20 MG/DL (ref 6–20)
BUN/CREAT SERPL: 18 (ref 12–20)
CALCIUM SERPL-MCNC: 9.5 MG/DL (ref 8.5–10.1)
CHLORIDE SERPL-SCNC: 102 MMOL/L (ref 97–108)
CO2 SERPL-SCNC: 31 MMOL/L (ref 21–32)
CREAT SERPL-MCNC: 1.13 MG/DL (ref 0.7–1.3)
DIFFERENTIAL METHOD BLD: ABNORMAL
EOSINOPHIL # BLD: 0.1 K/UL (ref 0–0.4)
EOSINOPHIL NFR BLD: 1 % (ref 0–7)
ERYTHROCYTE [DISTWIDTH] IN BLOOD BY AUTOMATED COUNT: 14.7 % (ref 11.5–14.5)
GLOBULIN SER CALC-MCNC: 4.6 G/DL (ref 2–4)
GLUCOSE SERPL-MCNC: 127 MG/DL (ref 65–100)
HCT VFR BLD AUTO: 45 % (ref 36.6–50.3)
HGB BLD-MCNC: 14.6 G/DL (ref 12.1–17)
IMM GRANULOCYTES # BLD AUTO: 0.1 K/UL (ref 0–0.04)
IMM GRANULOCYTES NFR BLD AUTO: 0 % (ref 0–0.5)
LYMPHOCYTES # BLD: 1.7 K/UL (ref 0.8–3.5)
LYMPHOCYTES NFR BLD: 15 % (ref 12–49)
MCH RBC QN AUTO: 31 PG (ref 26–34)
MCHC RBC AUTO-ENTMCNC: 32.4 G/DL (ref 30–36.5)
MCV RBC AUTO: 95.5 FL (ref 80–99)
MONOCYTES # BLD: 1.3 K/UL (ref 0–1)
MONOCYTES NFR BLD: 12 % (ref 5–13)
NEUTS SEG # BLD: 7.9 K/UL (ref 1.8–8)
NEUTS SEG NFR BLD: 72 % (ref 32–75)
NRBC # BLD: 0 K/UL (ref 0–0.01)
NRBC BLD-RTO: 0 PER 100 WBC
PLATELET # BLD AUTO: 216 K/UL (ref 150–400)
PMV BLD AUTO: 9.6 FL (ref 8.9–12.9)
POTASSIUM SERPL-SCNC: 4.4 MMOL/L (ref 3.5–5.1)
PROT SERPL-MCNC: 7.6 G/DL (ref 6.4–8.2)
RBC # BLD AUTO: 4.71 M/UL (ref 4.1–5.7)
SARS-COV-2, COV2: NORMAL
SODIUM SERPL-SCNC: 137 MMOL/L (ref 136–145)
TROPONIN-HIGH SENSITIVITY: 14 NG/L (ref 0–76)
WBC # BLD AUTO: 11.1 K/UL (ref 4.1–11.1)

## 2022-01-04 PROCEDURE — 80053 COMPREHEN METABOLIC PANEL: CPT

## 2022-01-04 PROCEDURE — 71275 CT ANGIOGRAPHY CHEST: CPT

## 2022-01-04 PROCEDURE — 71046 X-RAY EXAM CHEST 2 VIEWS: CPT

## 2022-01-04 PROCEDURE — 74011250637 HC RX REV CODE- 250/637: Performed by: EMERGENCY MEDICINE

## 2022-01-04 PROCEDURE — 85025 COMPLETE CBC W/AUTO DIFF WBC: CPT

## 2022-01-04 PROCEDURE — 74011000636 HC RX REV CODE- 636: Performed by: EMERGENCY MEDICINE

## 2022-01-04 PROCEDURE — 84484 ASSAY OF TROPONIN QUANT: CPT

## 2022-01-04 PROCEDURE — 99285 EMERGENCY DEPT VISIT HI MDM: CPT

## 2022-01-04 PROCEDURE — U0005 INFEC AGEN DETEC AMPLI PROBE: HCPCS

## 2022-01-04 PROCEDURE — 93005 ELECTROCARDIOGRAM TRACING: CPT

## 2022-01-04 RX ORDER — DOXYCYCLINE HYCLATE 100 MG
100 TABLET ORAL
Status: COMPLETED | OUTPATIENT
Start: 2022-01-04 | End: 2022-01-04

## 2022-01-04 RX ORDER — DOXYCYCLINE HYCLATE 100 MG
100 TABLET ORAL 2 TIMES DAILY
Qty: 14 TABLET | Refills: 0 | Status: SHIPPED | OUTPATIENT
Start: 2022-01-04 | End: 2022-01-11

## 2022-01-04 RX ADMIN — DOXYCYCLINE HYCLATE 100 MG: 100 TABLET, COATED ORAL at 23:24

## 2022-01-04 RX ADMIN — IOPAMIDOL 80 ML: 755 INJECTION, SOLUTION INTRAVENOUS at 21:38

## 2022-01-05 LAB
ATRIAL RATE: 92 BPM
CALCULATED P AXIS, ECG09: 39 DEGREES
CALCULATED R AXIS, ECG10: -72 DEGREES
CALCULATED T AXIS, ECG11: 74 DEGREES
DIAGNOSIS, 93000: NORMAL
P-R INTERVAL, ECG05: 196 MS
Q-T INTERVAL, ECG07: 384 MS
QRS DURATION, ECG06: 154 MS
QTC CALCULATION (BEZET), ECG08: 474 MS
VENTRICULAR RATE, ECG03: 92 BPM

## 2022-01-05 NOTE — ED NOTES
MD Castro reviewed discharge instructions with the patient. The patient verbalized understanding. Patient discharged home with stable vitals. Patient out of ED with discharge instructions in hand. Opportunity for questions and clarification provided. No further complaints noted at this time.

## 2022-01-06 LAB
SARS-COV-2, XPLCVT: NOT DETECTED
SOURCE, COVRS: NORMAL

## 2022-01-24 ENCOUNTER — OFFICE VISIT (OUTPATIENT)
Dept: CARDIOLOGY CLINIC | Age: 87
End: 2022-01-24
Payer: MEDICARE

## 2022-01-24 VITALS
OXYGEN SATURATION: 97 % | BODY MASS INDEX: 35.49 KG/M2 | HEIGHT: 67 IN | SYSTOLIC BLOOD PRESSURE: 122 MMHG | WEIGHT: 226.1 LBS | HEART RATE: 68 BPM | RESPIRATION RATE: 18 BRPM | DIASTOLIC BLOOD PRESSURE: 70 MMHG

## 2022-01-24 DIAGNOSIS — I45.10 RIGHT BUNDLE BRANCH BLOCK: ICD-10-CM

## 2022-01-24 DIAGNOSIS — Z78.9 STATIN INTOLERANCE: ICD-10-CM

## 2022-01-24 DIAGNOSIS — R94.39 ABNORMAL STRESS TEST: ICD-10-CM

## 2022-01-24 DIAGNOSIS — I25.10 ATHEROSCLEROSIS OF NATIVE CORONARY ARTERY OF NATIVE HEART WITHOUT ANGINA PECTORIS: Primary | ICD-10-CM

## 2022-01-24 PROCEDURE — G8417 CALC BMI ABV UP PARAM F/U: HCPCS | Performed by: INTERNAL MEDICINE

## 2022-01-24 PROCEDURE — 93010 ELECTROCARDIOGRAM REPORT: CPT | Performed by: INTERNAL MEDICINE

## 2022-01-24 PROCEDURE — G0463 HOSPITAL OUTPT CLINIC VISIT: HCPCS | Performed by: INTERNAL MEDICINE

## 2022-01-24 PROCEDURE — G8427 DOCREV CUR MEDS BY ELIG CLIN: HCPCS | Performed by: INTERNAL MEDICINE

## 2022-01-24 PROCEDURE — 1101F PT FALLS ASSESS-DOCD LE1/YR: CPT | Performed by: INTERNAL MEDICINE

## 2022-01-24 PROCEDURE — 93005 ELECTROCARDIOGRAM TRACING: CPT | Performed by: INTERNAL MEDICINE

## 2022-01-24 PROCEDURE — 99214 OFFICE O/P EST MOD 30 MIN: CPT | Performed by: INTERNAL MEDICINE

## 2022-01-24 PROCEDURE — G8510 SCR DEP NEG, NO PLAN REQD: HCPCS | Performed by: INTERNAL MEDICINE

## 2022-01-24 PROCEDURE — G8536 NO DOC ELDER MAL SCRN: HCPCS | Performed by: INTERNAL MEDICINE

## 2022-01-24 RX ORDER — METOPROLOL SUCCINATE 50 MG/1
TABLET, EXTENDED RELEASE ORAL DAILY
COMMUNITY

## 2022-01-24 RX ORDER — RANOLAZINE 500 MG/1
500 TABLET, EXTENDED RELEASE ORAL 2 TIMES DAILY
Qty: 60 TABLET | Refills: 4 | Status: SHIPPED | OUTPATIENT
Start: 2022-01-24 | End: 2022-04-01

## 2022-01-24 NOTE — PROGRESS NOTES
Chief Complaint   Patient presents with    Coronary Artery Disease     3 Month follow up - would like to discuss card cath today     Shortness of Breath     upon exertion      1. Have you been to the ER, urgent care clinic since your last visit? Hospitalized since your last visit? Yes 1/4/22 ED [facility] ER for sob - touch of pneumonia    2. Have you seen or consulted any other health care providers outside of the [address] [place] [address] since your last visit? Include any pap smears or colon screening.   No

## 2022-01-24 NOTE — PROGRESS NOTES
1/24/2022 9:41 AM      Subjective:     Lashell Tamayo denies chest pain, chest pressure/discomfort, dyspnea, palpitations, irregular heart beats, near-syncope, syncope, fatigue, orthopnea, paroxysmal nocturnal dyspnea, exertional chest pressure/discomfort, claudication, lower extremity edema. Visit Vitals  /70 (BP 1 Location: Left arm, BP Patient Position: Sitting, BP Cuff Size: Large adult)   Pulse 68   Resp 18   Ht 5' 7\" (1.702 m)   Wt 226 lb 1.6 oz (102.6 kg)   SpO2 97%   BMI 35.41 kg/m²     Current Outpatient Medications   Medication Sig    metoprolol succinate (TOPROL-XL) 50 mg XL tablet Take  by mouth daily. Takes 1/4 tab daily    ranolazine ER (Ranexa) 500 mg SR tablet Take 1 Tablet by mouth two (2) times a day.  acetaminophen (TYLENOL) 500 mg tablet Take 1,000 mg by mouth every six (6) hours as needed for Pain.  tamsulosin (FLOMAX) 0.4 mg capsule Take 0.4 mg by mouth daily.  enalapril (VASOTEC) 10 mg tablet Take 10 mg by mouth daily.  saw palmetto 450 mg cap Take 450 mg by mouth three (3) times daily. (Patient not taking: Reported on 1/24/2022)    nitroglycerin (NITROSTAT) 0.4 mg SL tablet TAKE ONE TABLET EVERY 5 MIN FOR CHEST PAIN X 3 (Patient not taking: Reported on 1/24/2022)    indomethacin (INDOCIN) 50 mg capsule TAKE 1 CAPSULE BY MOUTH 3 TIMES A DAY AS NEEDED **TAKE WITH FOOD OR MILK (Patient not taking: Reported on 1/24/2022)    acetaminophen-codeine (TYLENOL-CODEINE #3) 300-30 mg per tablet Take 1 Tab by mouth every six (6) hours as needed for Pain. (Patient not taking: Reported on 1/24/2022)     No current facility-administered medications for this visit.          Objective:      Visit Vitals  /70 (BP 1 Location: Left arm, BP Patient Position: Sitting, BP Cuff Size: Large adult)   Pulse 68   Resp 18   Ht 5' 7\" (1.702 m)   Wt 226 lb 1.6 oz (102.6 kg)   SpO2 97%   BMI 35.41 kg/m²       Past Medical History:   Diagnosis Date    Arthritis     CAD (coronary artery disease)     Chest pain, unspecified     Congestive heart failure (HCC)     Essential hypertension     Essential hypertension, benign     GERD (gastroesophageal reflux disease)     lower back    Hypertension     Lung cancer ([facility] [address]     lf lung 2014    Mixed hyperlipidemia     Pneumonia 2022    Recurrent kidney stones       Past Surgical History:   Procedure Laterality Date    HX APPENDECTOMY      HX HEART CATHETERIZATION      HX HEENT      tonsillectomy    HX ORTHOPAEDIC      back surgery x2 1977, right shoulder surgery    HX ORTHOPAEDIC      bilateral knee replacement    HX OTHER SURGICAL      neck or cancer removal    MD CHEST SURGERY PROCEDURE UNLISTED  9/3/13    left thoracoscopy; left upper lobectomy; left pericostal nerve block; lymph node dissection     Allergies   Allergen Reactions    Ciprofloxacin Swelling     In hands and feet    Other Medication Other (comments)     Pt states cough medicine afters his prostrate      Family History   Problem Relation Age of Onset    Diabetes Brother     Heart Disease Brother       Social History     Socioeconomic History    Marital status:      Spouse name: Not on file    Number of children: Not on file    Years of education: Not on file    Highest education level: Not on file   Occupational History    Not on file   Tobacco Use    Smoking status: Former Smoker     Packs/day: 1.00     Years: 20.00     Pack years: 20.00     Types: Cigarettes     Quit date: 1985     Years since quittin.0    Smokeless tobacco: Never Used   Vaping Use    Vaping Use: Never used   Substance and Sexual Activity    Alcohol use: No     Comment: not since     Drug use: No    Sexual activity: Not on file   Other Topics Concern    Not on file   Social History Narrative    Not on file     Social Determinants of Health     Financial Resource Strain:     Difficulty of Paying Living Expenses: Not on file   Food Insecurity:     Worried About [facility] in the Last Year: Not on file    Jaun of Food in the Last Year: Not on file   Transportation Needs:     Lack of Transportation (Medical): Not on file    Lack of Transportation (Non-Medical): Not on file   Physical Activity:     Days of Exercise per Week: Not on file    Minutes of Exercise per Session: Not on file   Stress:     Feeling of Stress : Not on file   Social Connections:     Frequency of Communication with Friends and Family: Not on file    Frequency of Social Gatherings with Friends and Family: Not on file    Attends Catholic Services: Not on file    Active Member of [address] [place] [address] or Organizations: Not on file    Attends Club or Organization Meetings: Not on file    Marital Status: Not on file   Intimate Partner Violence:     Fear of Current or Ex-Partner: Not on file    Emotionally Abused: Not on file    Physically Abused: Not on file    Sexually Abused: Not on file   Housing Stability:     Unable to Pay for Housing in the Last Year: Not on file    Number of [place] in the Last Year: Not on file    Unstable Housing in the Last Year: Not on file         Assessment:       ICD-10-CM ICD-9-CM    1. Atherosclerosis of native coronary artery of native heart without angina pectoris  I25.10 414.01 AMB POC EKG ROUTINE W/ 12 LEADS, INTER & REP   2. Right bundle branch block/EZEQUIEL--bifascicular heart block  I45.10 426.4    3. Abnormal stress test  R94.39 794.39    4. Statin intolerance  Z78.9 995.27        Plan:     1. Coronary artery disease  remote hx of MI 1985. Cardiac cath in 2012  of RCA. 8/2013. Last stress test noted. Still not interested in cardiac cath. Add ranexa. Will call if changes his mind.      2. HTN  Continue current meds.      3. Statin intolerant.

## 2022-01-25 ENCOUNTER — TELEPHONE (OUTPATIENT)
Dept: CARDIOLOGY CLINIC | Age: 87
End: 2022-01-25

## 2022-01-25 NOTE — TELEPHONE ENCOUNTER
Pt's son Kristie Garcia called in regards to scheduling a surgery for the pt with  . He wants to know what the next steps are as it was discussed during the pt's last appt .     Callback is [phone]    Thanks  Klaus Blevins

## 2022-01-25 NOTE — TELEPHONE ENCOUNTER
Returned call,verified Colletta Peacemaker on [facility], he just wanted to know if any testing could be done to look at the blockages in the heart w/o doing the cath-he is worried about anesthesia for pt. He did talk to  and understands there is a 1% risk with procedure. I advised there is no other test or procedure that can tell us the percentage of blockage or if pt has one. He stated pt is willing to go ahead with procedure. He wants to know if his heart is really giving him the symptoms he is experiencing. I advised I will send message to our  and she will call to get pt scheduled. Colletta Peacemaker verbalized understanding.

## 2022-01-26 DIAGNOSIS — R94.39 ABNORMAL STRESS TEST: Primary | ICD-10-CM

## 2022-01-26 DIAGNOSIS — I25.10 ATHEROSCLEROSIS OF NATIVE CORONARY ARTERY OF NATIVE HEART WITHOUT ANGINA PECTORIS: ICD-10-CM

## 2022-01-31 ENCOUNTER — TELEPHONE (OUTPATIENT)
Dept: CARDIOLOGY CLINIC | Age: 87
End: 2022-01-31

## 2022-01-31 NOTE — TELEPHONE ENCOUNTER
Pt son Davi Quiroga is calling to let you know Feb 16,2022 at 11:00am is ok. Juan Jose call him back at [phone].       Thanks Daphne

## 2022-02-10 LAB
ALBUMIN SERPL-MCNC: 3.8 G/DL (ref 3.5–4.6)
ALBUMIN/GLOB SERPL: 1.2 {RATIO} (ref 1.2–2.2)
ALP SERPL-CCNC: 65 IU/L (ref 44–121)
ALT SERPL-CCNC: 20 IU/L (ref 0–44)
AST SERPL-CCNC: 17 IU/L (ref 0–40)
BILIRUB SERPL-MCNC: 0.5 MG/DL (ref 0–1.2)
BUN SERPL-MCNC: 22 MG/DL (ref 10–36)
BUN/CREAT SERPL: 22 (ref 10–24)
CALCIUM SERPL-MCNC: 9.3 MG/DL (ref 8.6–10.2)
CHLORIDE SERPL-SCNC: 100 MMOL/L (ref 96–106)
CO2 SERPL-SCNC: 25 MMOL/L (ref 20–29)
CREAT SERPL-MCNC: 1.01 MG/DL (ref 0.76–1.27)
ERYTHROCYTE [DISTWIDTH] IN BLOOD BY AUTOMATED COUNT: 13.7 % (ref 11.6–15.4)
GLOBULIN SER CALC-MCNC: 3.2 G/DL (ref 1.5–4.5)
GLUCOSE SERPL-MCNC: 130 MG/DL (ref 65–99)
HCT VFR BLD AUTO: 47.7 % (ref 37.5–51)
HGB BLD-MCNC: 16 G/DL (ref 13–17.7)
INR PPP: 1 (ref 0.9–1.2)
MCH RBC QN AUTO: 31.3 PG (ref 26.6–33)
MCHC RBC AUTO-ENTMCNC: 33.5 G/DL (ref 31.5–35.7)
MCV RBC AUTO: 93 FL (ref 79–97)
PLATELET # BLD AUTO: 198 X10E3/UL (ref 150–450)
POTASSIUM SERPL-SCNC: 5.1 MMOL/L (ref 3.5–5.2)
PROT SERPL-MCNC: 7 G/DL (ref 6–8.5)
PROTHROMBIN TIME: 10.4 SEC (ref 9.1–12)
RBC # BLD AUTO: 5.11 X10E6/UL (ref 4.14–5.8)
SODIUM SERPL-SCNC: 139 MMOL/L (ref 134–144)
WBC # BLD AUTO: 8.3 X10E3/UL (ref 3.4–10.8)

## 2022-02-16 ENCOUNTER — HOSPITAL ENCOUNTER (OUTPATIENT)
Age: 87
Setting detail: OUTPATIENT SURGERY
Discharge: HOME | End: 2022-02-16
Attending: INTERNAL MEDICINE | Admitting: INTERNAL MEDICINE
Payer: MEDICARE

## 2022-02-16 VITALS
WEIGHT: 220 LBS | HEART RATE: 58 BPM | HEIGHT: 67 IN | OXYGEN SATURATION: 97 % | BODY MASS INDEX: 34.53 KG/M2 | RESPIRATION RATE: 16 BRPM | TEMPERATURE: 98 F | DIASTOLIC BLOOD PRESSURE: 72 MMHG | SYSTOLIC BLOOD PRESSURE: 164 MMHG

## 2022-02-16 DIAGNOSIS — R07.9 CHEST PAIN, UNSPECIFIED TYPE: ICD-10-CM

## 2022-02-16 DIAGNOSIS — R94.39 ABNORMAL STRESS TEST: ICD-10-CM

## 2022-02-16 PROCEDURE — 74011000250 HC RX REV CODE- 250: Performed by: INTERNAL MEDICINE

## 2022-02-16 PROCEDURE — 93458 L HRT ARTERY/VENTRICLE ANGIO: CPT | Performed by: INTERNAL MEDICINE

## 2022-02-16 PROCEDURE — C1894 INTRO/SHEATH, NON-LASER: HCPCS | Performed by: INTERNAL MEDICINE

## 2022-02-16 PROCEDURE — 99152 MOD SED SAME PHYS/QHP 5/>YRS: CPT | Performed by: INTERNAL MEDICINE

## 2022-02-16 PROCEDURE — 99153 MOD SED SAME PHYS/QHP EA: CPT | Performed by: INTERNAL MEDICINE

## 2022-02-16 PROCEDURE — 77030008543 HC TBNG MON PRSS MRTM -A: Performed by: INTERNAL MEDICINE

## 2022-02-16 PROCEDURE — 77030004549 HC CATH ANGI DX PRF MRTM -A: Performed by: INTERNAL MEDICINE

## 2022-02-16 PROCEDURE — 2709999900 HC NON-CHARGEABLE SUPPLY: Performed by: INTERNAL MEDICINE

## 2022-02-16 PROCEDURE — 77030010221 HC SPLNT WR POS TELE -B: Performed by: INTERNAL MEDICINE

## 2022-02-16 PROCEDURE — 77030015766: Performed by: INTERNAL MEDICINE

## 2022-02-16 PROCEDURE — 74011000636 HC RX REV CODE- 636: Performed by: INTERNAL MEDICINE

## 2022-02-16 PROCEDURE — 74011250636 HC RX REV CODE- 250/636: Performed by: INTERNAL MEDICINE

## 2022-02-16 PROCEDURE — 77030040934 HC CATH DIAG DXTERITY MEDT -A: Performed by: INTERNAL MEDICINE

## 2022-02-16 PROCEDURE — 77030042317 HC BND COMPR HEMSTAT -B: Performed by: INTERNAL MEDICINE

## 2022-02-16 PROCEDURE — C1769 GUIDE WIRE: HCPCS | Performed by: INTERNAL MEDICINE

## 2022-02-16 RX ORDER — HEPARIN SODIUM 200 [USP'U]/100ML
INJECTION, SOLUTION INTRAVENOUS
Status: COMPLETED | OUTPATIENT
Start: 2022-02-16 | End: 2022-02-16

## 2022-02-16 RX ORDER — SODIUM CHLORIDE 9 MG/ML
100 INJECTION, SOLUTION INTRAVENOUS CONTINUOUS
Status: DISCONTINUED | OUTPATIENT
Start: 2022-02-16 | End: 2022-02-16 | Stop reason: HOSPADM

## 2022-02-16 RX ORDER — MIDAZOLAM HYDROCHLORIDE 1 MG/ML
INJECTION, SOLUTION INTRAMUSCULAR; INTRAVENOUS AS NEEDED
Status: DISCONTINUED | OUTPATIENT
Start: 2022-02-16 | End: 2022-02-16 | Stop reason: HOSPADM

## 2022-02-16 RX ORDER — VERAPAMIL HYDROCHLORIDE 2.5 MG/ML
INJECTION, SOLUTION INTRAVENOUS AS NEEDED
Status: DISCONTINUED | OUTPATIENT
Start: 2022-02-16 | End: 2022-02-16 | Stop reason: HOSPADM

## 2022-02-16 RX ORDER — FENTANYL CITRATE 50 UG/ML
INJECTION, SOLUTION INTRAMUSCULAR; INTRAVENOUS AS NEEDED
Status: DISCONTINUED | OUTPATIENT
Start: 2022-02-16 | End: 2022-02-16 | Stop reason: HOSPADM

## 2022-02-16 RX ORDER — HEPARIN SODIUM 1000 [USP'U]/ML
INJECTION, SOLUTION INTRAVENOUS; SUBCUTANEOUS AS NEEDED
Status: DISCONTINUED | OUTPATIENT
Start: 2022-02-16 | End: 2022-02-16 | Stop reason: HOSPADM

## 2022-02-16 RX ORDER — LIDOCAINE HYDROCHLORIDE 10 MG/ML
INJECTION INFILTRATION; PERINEURAL AS NEEDED
Status: DISCONTINUED | OUTPATIENT
Start: 2022-02-16 | End: 2022-02-16 | Stop reason: HOSPADM

## 2022-02-16 NOTE — PROGRESS NOTES
PROCEDURE SITE CHECK:    Procedure Site check: No bleeding, no hematoma, no pain/discomfort reported. No change in patient status. Continue to monitor patient and status. TR BAND REMOVAL    TR Band released 3 ml. No bleeding, no hematoma, no change in radial or ulnar pulses. Continue to monitor site. Patient tolerated well. No change in status noted or reported.

## 2022-02-16 NOTE — DISCHARGE INSTRUCTIONS
[address] [place] [address]  [phone]        Patient ID:  Jake Poole  [id number]  73 y.o.  10/3/1931    Admit Date: 2/16/2022    Discharge Date: 2/16/2022     Admitting Physician: Shaina Ball MD     Discharge Physician: Shaina Ball MD    Admission Diagnoses:   Chest pain, unspecified type [R07.9]    Discharge Diagnoses: Active Problems:    * No active hospital problems. *      Discharge Condition: Good    Cardiology Procedures this Admission:  Diagnostic left heart catheterization    Disposition: home    Reference discharge instructions provided by nursing for diet and activity. Signed: Shaina Ball MD  2/16/2022  12:43 PM        Radial Cardiac Catheterization/Angiography Discharge Instructions    It is normal to feel tired the first couple days. Take it easy and follow the physicians instructions. CHECK THE CATHETER INSERTION SITE DAILY:    Remove the wrist dressing 24 hours after the procedure. You may shower 24 hours after the procedure. Wash with soap and water and pat dry. Gentle cleaning of the site with soap and water is sufficient, cover with a dry clean dressing or bandage. Do not apply creams or powders to the area. No soaking the wrist for 3 days. Leave the puncture site open to air after 24 hours post-procedure. CALL THE PHYSICIANS:     If the site becomes red, swollen or feels warm to the touch  If there is bleeding or drainage or if there is unusual pain at the radial site. If there is any minor oozing, you may apply a band-aid and remove after 12 hours. If the bleeding continues, hold pressure with the middle finger against the puncture site and the thumb against the back of the wrist,call 911 to be transported to the hospital.  DO NOT DRIVE YOURSELF, [facility]     ACTIVITY:   For the first 24 hours do not manipulate the wrist.  No lifting, pushing or pulling over 3-5 pounds with the affected wrist for 7 daysand no straining the insertion site. Do not life grocery bags or the garbage can, do not run the vacuum  or  for 7 days. Start with short walks as in the hospital and gradually increase as tolerated each day. It is recommended to walk 30 minutes 5-7 days per week. Follow your physicians instructions on activity. Avoid walking outside in extremes of heat or cold. Walk inside when it is cold and windy or hot and humid. Things to keep in mind:  No driving for at least 24 hours, or as designated by your physician. Limit the number of times you go up and down the stairs  Take rests and pace yourself with activity. Be careful and do not strain with bowel movements. MEDICATIONS:    Take all medications as prescribed  Call your physician if you have any questions  Keep an updated list of your medications with you at all times and give a list to your physician and pharmacist    SIGNS AND SYMPTOMS:   Be cautious of symptoms of angina or recurrent symptoms such as chest discomfort, unusual shortness of breath or fatigue. These could be symptoms of restenosis, a new blockage or a heart attack. If your symptoms are relieved with rest it is still recommended that you notify your physician of recurrent chest pain or discomfort. For CHEST PAIN or symptoms of angina not relieved with rest:  If the discomfort is not relieved with rest, and you have been prescribed Nitroglycerin, take as directed (taken under the tongue, one at a time 5 minutes apart for a total of 3 doses). If the discomfort is not relieved after the 3rd nitroglycerin, call 911. If you have not been prescribed Nitroglycerin  and your chest discomfort is not relieved with rest, call 911. AFTER CARE:   Follow up with your physician as instructed.   Follow a heart healthy diet with proper portion control, daily stress management, daily exercise, blood pressure and cholesterol control , and smoking cessation.

## 2022-02-16 NOTE — Clinical Note
TRANSFER - OUT REPORT:     Verbal report given to: Recovery RN, (at bedside). Report consisted of patient's Situation, Background, Assessment and   Recommendations(SBAR). Opportunity for questions and clarification was provided. Patient transported to: recovery.

## 2022-02-16 NOTE — PROGRESS NOTES
Cardiac Cath Lab Recovery Arrival Note:      Rosy Stout arrived to Cardiac Cath Lab, Recovery Area. Staff introduced to patient. Patient identifiers verified with NAME and DATE OF BIRTH. Procedure verified with patient. Consent forms reviewed and signed by patient or authorized representative and verified. Allergies verified. Patient and family oriented to department. Patient and family informed of procedure and plan of care. Questions answered with review. Patient prepped for procedure, per orders from physician, prior to arrival.    Patient on cardiac monitor, non-invasive blood pressure, SPO2 monitor. On room air. Patient is A&Ox 3. Patient reports no c/o. Patient in stretcher, in low position, with side rails up, call bell within reach, patient instructed to call if assistance as needed. Patient prep in: [address], [place]. Patient family has pager # none  Family in: [facility] waiting area.    Prep by: Natalie Hadley, JULIANNA and Rebecca Cagle RN

## 2022-02-16 NOTE — Clinical Note
Aspirin Registry Question:   Aspirin was discussed with physician prior to the procedure. Aspirin was not given prior to the procedure. Patient states it makes his nose bleed.

## 2022-02-16 NOTE — PROGRESS NOTES
PROCEDURE SITE CHECK:    Procedure Site check: No bleeding, no hematoma, no pain/discomfort reported. No change in patient status. Continue to monitor patient and status. TR BAND REMOVAL    TR Band REMOVED. dressing applied to site/ wrist guard remained. No bleeding, no hematoma, no change in radial or ulnar pulses. Continue to monitor site. Patient tolerated well. No change in status noted or reported. TRANSFER - OUT REPORT:    Verbal report given to JULIANNA Lynn [facility] routine progression of care       Report consisted of patients Situation, Background, Assessment and   Recommendations(SBAR). Information from the following report(s) Procedure Summary was reviewed with the receiving nurse. Opportunity for questions and clarification was provided.

## 2022-02-16 NOTE — PROGRESS NOTES
Primary Nurse Ana Minaya RN and Joseph Dominguez RN performed a dual skin assessment on this patient No impairment noted  Mamadou score is 23

## 2022-02-16 NOTE — PROGRESS NOTES
Ambulate with pt in zuniga to BR. Gait steady. Pt denies c/o. Right wrist dressing dry and intact. DC instructions reviewed with pt and son. Both verbalize understanding. SL dc'd without difficulty. Pt wheeled to front door to be driven home by son.

## 2022-02-16 NOTE — CARDIO/PULMONARY
Cardiac Rehab Note: chart review    Cardiac cath, without intervention, 2/16/22    Smoking history assessed. Patient is a non smoker. EF is unknown at this time      Patient not seen due to conditions listed above.  No MI or CAD noted

## 2022-02-16 NOTE — H&P
H&P reviewed  Patient examined  No changes have occurred  Patient due to development of symptoms, previous abnormal stress test and after d/w family decided to proceed with cardiac cath. I discussed the risks/benefits/alternatives of the procedure with the patient. Risks include (but are not limited to) bleeding, infection, cva/mi/tamponade/death. The patient understands and agrees to proceed.

## 2022-03-18 PROBLEM — N39.0 UTI (URINARY TRACT INFECTION): Status: ACTIVE | Noted: 2017-11-16

## 2022-03-18 PROBLEM — A41.9 SEPSIS (HCC): Status: ACTIVE | Noted: 2017-11-16

## 2022-03-18 PROBLEM — Z78.9 STATIN INTOLERANCE: Status: ACTIVE | Noted: 2022-01-24

## 2022-03-19 PROBLEM — R94.39 ABNORMAL STRESS TEST: Status: ACTIVE | Noted: 2022-01-24

## 2022-04-01 NOTE — PERIOP NOTES
[facility]  Preoperative Instructions        Surgery Date 4/13/22          Time of Arrival:  To Be Called  Contact # [phone] Pinky Arrieta, son)    1. On the day of your surgery, please report to the Surgical Services Registration Desk and sign in at your designated time. The Surgery Center is located to the right of the Emergency Room. 2. You must have someone with you to drive you home. You should not drive a car for 24 hours following surgery. Please make arrangements for a friend or family member to stay with you for the first 24 hours after your surgery. 3. Do not have anything to eat or drink (including water, gum, mints, coffee, juice) after midnight ? 4/12/22? Marques Kohler ? This may not apply to medications prescribed by your physician. ?(Please note below the special instructions with medications to take the morning of your procedure.)    4. We recommend you do not drink any alcoholic beverages for 24 hours before and after your surgery. 5. Contact your surgeons office for instructions on the following medications: non-steroidal anti-inflammatory drugs (i.e. Advil, Aleve), vitamins, and supplements. (Some surgeons will want you to stop these medications prior to surgery and others may allow you to take them)  **If you are currently taking Plavix, Coumadin, Aspirin and/or other blood-thinning agents, contact your surgeon for instructions. ** Your surgeon will partner with the physician prescribing these medications to determine if it is safe to stop or if you need to continue taking. Please do not stop taking these medications without instructions from your surgeon    6. Wear comfortable clothes. Wear glasses instead of contacts. Do not bring any money or jewelry. Please bring picture ID, insurance card, and any prearranged co-payment or hospital payment. Do not wear make-up, particularly mascara the morning of your surgery.   Do not wear nail polish, particularly if you are having foot /hand surgery. Wear your hair loose or down, no ponytails, buns, malika pins or clips. All body piercings must be removed. Please shower with antibacterial soap for three consecutive days before and on the morning of surgery, but do not apply any lotions, powders or deodorants after the shower on the day of surgery. Please use a fresh towels after each shower. Please sleep in clean clothes and change bed linens the night before surgery. Please do not shave for 48 hours prior to surgery. Shaving of the face is acceptable. 7. You should understand that if you do not follow these instructions your surgery may be cancelled. If your physical condition changes (I.e. fever, cold or flu) please contact your surgeon as soon as possible. 8. It is important that you be on time. If a situation occurs where you may be late, please call [phone] (OR Holding Area). 9. If you have any questions and or problems, please call [phone] (Pre-admission Testing). 10. Your surgery time may be subject to change. You will receive a phone call the evening prior if your time changes. 11.  If having outpatient surgery, you must have someone to drive you here, stay with you during the duration of your stay, and to drive you home at time of discharge. 12.  Due to current COVID restrictions, only ONE adult may accompany you the day of the procedure. We have limited seating available. If our waiting room is at capacity, your ride may be asked to remain in their vehicle. No children are allowed in the waiting room. Special Instructions:     TAKE ALL MEDICATIONS DAY OF SURGERY EXCEPT: [place]      I understand a pre-operative phone call will be made to verify my surgery time. In the event that I am not available, I give permission for a message to be left on my answering service and/or with another person?   yes         ___________________      __________   _________    Juan Cutting of Patient) (Witness)                (Date and Time)

## 2022-04-01 NOTE — PERIOP NOTES
During PAT phone call, pt gave verbal permission to discuss medical history and upcoming procedure (Dr. Safia Woodson) with son, Augustin Archibald.

## 2022-04-13 ENCOUNTER — ANESTHESIA EVENT (OUTPATIENT)
Dept: SURGERY | Age: 87
End: 2022-04-13
Payer: MEDICARE

## 2022-04-13 ENCOUNTER — ANESTHESIA (OUTPATIENT)
Dept: SURGERY | Age: 87
End: 2022-04-13
Payer: MEDICARE

## 2022-04-13 ENCOUNTER — HOSPITAL ENCOUNTER (OUTPATIENT)
Age: 87
Setting detail: OUTPATIENT SURGERY
Discharge: HOME | End: 2022-04-13
Attending: PLASTIC SURGERY | Admitting: PLASTIC SURGERY
Payer: MEDICARE

## 2022-04-13 VITALS
HEIGHT: 67 IN | TEMPERATURE: 98 F | HEART RATE: 76 BPM | SYSTOLIC BLOOD PRESSURE: 133 MMHG | RESPIRATION RATE: 14 BRPM | BODY MASS INDEX: 35.85 KG/M2 | DIASTOLIC BLOOD PRESSURE: 66 MMHG | WEIGHT: 228.4 LBS | OXYGEN SATURATION: 95 %

## 2022-04-13 DIAGNOSIS — C44.42 SQUAMOUS CELL CARCINOMA OF SCALP: Primary | ICD-10-CM

## 2022-04-13 PROCEDURE — 77030020143 HC AIRWY LARYN INTUB CGAS -A: Performed by: ANESTHESIOLOGY

## 2022-04-13 PROCEDURE — 74011250636 HC RX REV CODE- 250/636: Performed by: NURSE ANESTHETIST, CERTIFIED REGISTERED

## 2022-04-13 PROCEDURE — 77030002916 HC SUT ETHLN J&J -A: Performed by: PLASTIC SURGERY

## 2022-04-13 PROCEDURE — 76210000021 HC REC RM PH II 0.5 TO 1 HR: Performed by: PLASTIC SURGERY

## 2022-04-13 PROCEDURE — 74011000250 HC RX REV CODE- 250: Performed by: NURSE ANESTHETIST, CERTIFIED REGISTERED

## 2022-04-13 PROCEDURE — 77030031139 HC SUT VCRL2 J&J -A: Performed by: PLASTIC SURGERY

## 2022-04-13 PROCEDURE — 88331 PATH CONSLTJ SURG 1 BLK 1SPC: CPT

## 2022-04-13 PROCEDURE — 2709999900 HC NON-CHARGEABLE SUPPLY: Performed by: PLASTIC SURGERY

## 2022-04-13 PROCEDURE — 76010000149 HC OR TIME 1 TO 1.5 HR: Performed by: PLASTIC SURGERY

## 2022-04-13 PROCEDURE — 74011250637 HC RX REV CODE- 250/637: Performed by: PLASTIC SURGERY

## 2022-04-13 PROCEDURE — 76210000006 HC OR PH I REC 0.5 TO 1 HR: Performed by: PLASTIC SURGERY

## 2022-04-13 PROCEDURE — 74011000250 HC RX REV CODE- 250: Performed by: PLASTIC SURGERY

## 2022-04-13 PROCEDURE — 88305 TISSUE EXAM BY PATHOLOGIST: CPT

## 2022-04-13 PROCEDURE — 76060000033 HC ANESTHESIA 1 TO 1.5 HR: Performed by: PLASTIC SURGERY

## 2022-04-13 PROCEDURE — 74011250636 HC RX REV CODE- 250/636: Performed by: ANESTHESIOLOGY

## 2022-04-13 PROCEDURE — 77030002986 HC SUT PROL J&J -A: Performed by: PLASTIC SURGERY

## 2022-04-13 RX ORDER — FENTANYL CITRATE 50 UG/ML
25 INJECTION, SOLUTION INTRAMUSCULAR; INTRAVENOUS
Status: CANCELLED | OUTPATIENT
Start: 2022-04-13

## 2022-04-13 RX ORDER — CEFAZOLIN SODIUM 1 G/3ML
INJECTION, POWDER, FOR SOLUTION INTRAMUSCULAR; INTRAVENOUS AS NEEDED
Status: DISCONTINUED | OUTPATIENT
Start: 2022-04-13 | End: 2022-04-13 | Stop reason: HOSPADM

## 2022-04-13 RX ORDER — FENTANYL CITRATE 50 UG/ML
INJECTION, SOLUTION INTRAMUSCULAR; INTRAVENOUS AS NEEDED
Status: DISCONTINUED | OUTPATIENT
Start: 2022-04-13 | End: 2022-04-13 | Stop reason: HOSPADM

## 2022-04-13 RX ORDER — SODIUM CHLORIDE, SODIUM LACTATE, POTASSIUM CHLORIDE, CALCIUM CHLORIDE 600; 310; 30; 20 MG/100ML; MG/100ML; MG/100ML; MG/100ML
25 INJECTION, SOLUTION INTRAVENOUS CONTINUOUS
Status: CANCELLED | OUTPATIENT
Start: 2022-04-13

## 2022-04-13 RX ORDER — PROPOFOL 10 MG/ML
INJECTION, EMULSION INTRAVENOUS
Status: DISCONTINUED | OUTPATIENT
Start: 2022-04-13 | End: 2022-04-13 | Stop reason: HOSPADM

## 2022-04-13 RX ORDER — ONDANSETRON 2 MG/ML
INJECTION INTRAMUSCULAR; INTRAVENOUS AS NEEDED
Status: DISCONTINUED | OUTPATIENT
Start: 2022-04-13 | End: 2022-04-13 | Stop reason: HOSPADM

## 2022-04-13 RX ORDER — PROPOFOL 10 MG/ML
INJECTION, EMULSION INTRAVENOUS AS NEEDED
Status: DISCONTINUED | OUTPATIENT
Start: 2022-04-13 | End: 2022-04-13 | Stop reason: HOSPADM

## 2022-04-13 RX ORDER — SODIUM CHLORIDE, SODIUM LACTATE, POTASSIUM CHLORIDE, CALCIUM CHLORIDE 600; 310; 30; 20 MG/100ML; MG/100ML; MG/100ML; MG/100ML
25 INJECTION, SOLUTION INTRAVENOUS CONTINUOUS
Status: DISCONTINUED | OUTPATIENT
Start: 2022-04-13 | End: 2022-04-13 | Stop reason: HOSPADM

## 2022-04-13 RX ORDER — LIDOCAINE HYDROCHLORIDE 20 MG/ML
INJECTION, SOLUTION EPIDURAL; INFILTRATION; INTRACAUDAL; PERINEURAL AS NEEDED
Status: DISCONTINUED | OUTPATIENT
Start: 2022-04-13 | End: 2022-04-13 | Stop reason: HOSPADM

## 2022-04-13 RX ORDER — HYDROCODONE BITARTRATE AND ACETAMINOPHEN 5; 325 MG/1; MG/1
1 TABLET ORAL
Qty: 10 TABLET | Refills: 0 | Status: SHIPPED | OUTPATIENT
Start: 2022-04-13 | End: 2022-04-16

## 2022-04-13 RX ORDER — HYDROMORPHONE HYDROCHLORIDE 1 MG/ML
.2-.5 INJECTION, SOLUTION INTRAMUSCULAR; INTRAVENOUS; SUBCUTANEOUS
Status: CANCELLED | OUTPATIENT
Start: 2022-04-13

## 2022-04-13 RX ORDER — EPHEDRINE SULFATE/0.9% NACL/PF 50 MG/5 ML
SYRINGE (ML) INTRAVENOUS AS NEEDED
Status: DISCONTINUED | OUTPATIENT
Start: 2022-04-13 | End: 2022-04-13 | Stop reason: HOSPADM

## 2022-04-13 RX ORDER — FENTANYL CITRATE 50 UG/ML
50 INJECTION, SOLUTION INTRAMUSCULAR; INTRAVENOUS AS NEEDED
Status: DISCONTINUED | OUTPATIENT
Start: 2022-04-13 | End: 2022-04-13 | Stop reason: HOSPADM

## 2022-04-13 RX ORDER — MIDAZOLAM HYDROCHLORIDE 1 MG/ML
1 INJECTION, SOLUTION INTRAMUSCULAR; INTRAVENOUS AS NEEDED
Status: DISCONTINUED | OUTPATIENT
Start: 2022-04-13 | End: 2022-04-13 | Stop reason: HOSPADM

## 2022-04-13 RX ORDER — PHENYLEPHRINE HCL IN 0.9% NACL 0.4MG/10ML
SYRINGE (ML) INTRAVENOUS AS NEEDED
Status: DISCONTINUED | OUTPATIENT
Start: 2022-04-13 | End: 2022-04-13 | Stop reason: HOSPADM

## 2022-04-13 RX ORDER — GLYCOPYRROLATE 0.2 MG/ML
INJECTION INTRAMUSCULAR; INTRAVENOUS AS NEEDED
Status: DISCONTINUED | OUTPATIENT
Start: 2022-04-13 | End: 2022-04-13 | Stop reason: HOSPADM

## 2022-04-13 RX ORDER — HYDROMORPHONE HYDROCHLORIDE 2 MG/ML
INJECTION, SOLUTION INTRAMUSCULAR; INTRAVENOUS; SUBCUTANEOUS AS NEEDED
Status: DISCONTINUED | OUTPATIENT
Start: 2022-04-13 | End: 2022-04-13 | Stop reason: HOSPADM

## 2022-04-13 RX ORDER — ONDANSETRON 2 MG/ML
4 INJECTION INTRAMUSCULAR; INTRAVENOUS AS NEEDED
Status: CANCELLED | OUTPATIENT
Start: 2022-04-13

## 2022-04-13 RX ORDER — LIDOCAINE HYDROCHLORIDE 10 MG/ML
0.1 INJECTION, SOLUTION EPIDURAL; INFILTRATION; INTRACAUDAL; PERINEURAL AS NEEDED
Status: DISCONTINUED | OUTPATIENT
Start: 2022-04-13 | End: 2022-04-13 | Stop reason: HOSPADM

## 2022-04-13 RX ORDER — LIDOCAINE HYDROCHLORIDE AND EPINEPHRINE 10; 10 MG/ML; UG/ML
INJECTION, SOLUTION INFILTRATION; PERINEURAL AS NEEDED
Status: DISCONTINUED | OUTPATIENT
Start: 2022-04-13 | End: 2022-04-13 | Stop reason: HOSPADM

## 2022-04-13 RX ORDER — DEXMEDETOMIDINE HYDROCHLORIDE 100 UG/ML
INJECTION, SOLUTION INTRAVENOUS AS NEEDED
Status: DISCONTINUED | OUTPATIENT
Start: 2022-04-13 | End: 2022-04-13 | Stop reason: HOSPADM

## 2022-04-13 RX ADMIN — Medication 3 AMPULE: at 11:33

## 2022-04-13 RX ADMIN — Medication 200 MCG: at 13:24

## 2022-04-13 RX ADMIN — GLYCOPYRROLATE 0.1 MG: 0.2 INJECTION, SOLUTION INTRAMUSCULAR; INTRAVENOUS at 12:29

## 2022-04-13 RX ADMIN — SODIUM CHLORIDE, POTASSIUM CHLORIDE, SODIUM LACTATE AND CALCIUM CHLORIDE 25 ML/HR: 600; 310; 30; 20 INJECTION, SOLUTION INTRAVENOUS at 11:33

## 2022-04-13 RX ADMIN — DEXMEDETOMIDINE HYDROCHLORIDE 10 MCG: 100 INJECTION, SOLUTION, CONCENTRATE INTRAVENOUS at 12:29

## 2022-04-13 RX ADMIN — Medication 100 MCG: at 12:42

## 2022-04-13 RX ADMIN — Medication 10 MG: at 13:08

## 2022-04-13 RX ADMIN — Medication 100 MCG: at 13:14

## 2022-04-13 RX ADMIN — FENTANYL CITRATE 100 MCG: 50 INJECTION, SOLUTION INTRAMUSCULAR; INTRAVENOUS at 12:29

## 2022-04-13 RX ADMIN — PROPOFOL 50 MG: 10 INJECTION, EMULSION INTRAVENOUS at 12:35

## 2022-04-13 RX ADMIN — Medication 20 MG: at 12:53

## 2022-04-13 RX ADMIN — LIDOCAINE HYDROCHLORIDE 40 MG: 20 INJECTION, SOLUTION EPIDURAL; INFILTRATION; INTRACAUDAL; PERINEURAL at 12:35

## 2022-04-13 RX ADMIN — ONDANSETRON HYDROCHLORIDE 4 MG: 2 INJECTION, SOLUTION INTRAMUSCULAR; INTRAVENOUS at 12:29

## 2022-04-13 RX ADMIN — Medication 100 MCG: at 12:53

## 2022-04-13 RX ADMIN — PROPOFOL 100 MCG/KG/MIN: 10 INJECTION, EMULSION INTRAVENOUS at 12:35

## 2022-04-13 RX ADMIN — CEFAZOLIN 2 G: 1 INJECTION, POWDER, FOR SOLUTION INTRAMUSCULAR; INTRAVENOUS; PARENTERAL at 12:38

## 2022-04-13 RX ADMIN — Medication 100 MCG: at 13:08

## 2022-04-13 RX ADMIN — HYDROMORPHONE HYDROCHLORIDE 0.5 MG: 2 INJECTION, SOLUTION INTRAMUSCULAR; INTRAVENOUS; SUBCUTANEOUS at 13:45

## 2022-04-13 NOTE — ANESTHESIA PREPROCEDURE EVALUATION
Relevant Problems   CARDIOVASCULAR   (+) Coronary atherosclerosis of native coronary artery-- RCA with L>R collaterals 1/2012   (+) Right bundle branch block/EZEQUIEL--bifascicular heart block      RENAL FAILURE   (+) Recurrent kidney stones      ENDOCRINE   (+) Morbid obesity (HCC)      PERSONAL HX & FAMILY HX OF CANCER   (+) Lung cancer (HCC)       Anesthetic History   No history of anesthetic complications            Review of Systems / Medical History  Patient summary reviewed, nursing notes reviewed and pertinent labs reviewed    Pulmonary  Within defined limits                 Neuro/Psych   Within defined limits           Cardiovascular    Hypertension        Dysrhythmias   CAD    Exercise tolerance: >4 METS     GI/Hepatic/Renal     GERD           Endo/Other        Morbid obesity and arthritis     Other Findings   Comments: Mixed hyperlipidemia  Recurrent kidney stones  Congestive heart failure   Pneumonia  Lung cancer           Physical Exam    Airway  Mallampati: II  TM Distance: 4 - 6 cm  Neck ROM: normal range of motion   Mouth opening: Normal     Cardiovascular  Regular rate and rhythm,  S1 and S2 normal,  no murmur, click, rub, or gallop  Rhythm: regular  Rate: normal         Dental  No notable dental hx       Pulmonary  Breath sounds clear to auscultation               Abdominal  GI exam deferred       Other Findings            Anesthetic Plan    ASA: 3  Anesthesia type: MAC          Induction: Intravenous  Anesthetic plan and risks discussed with: Patient

## 2022-04-13 NOTE — DISCHARGE INSTRUCTIONS
May shower tomorrow and wash hair. Keep incision clean. No heavy lifting or vigorous activity for a few days. DISCHARGE SUMMARY from Nurse    PATIENT INSTRUCTIONS:    After general anesthesia or intravenous sedation, for 24 hours or while taking prescription narcotics:  · Limit your activities  · Do not drive and operate hazardous machinery  · Do not make important personal or business decisions  · Do not drink alcoholic beverages  · If you have not urinated within 8 hours after discharge, please contact your surgeon on call. Report the following to your surgeon:  · Excessive pain, swelling, redness or odor of or around the surgical area  · Temperature over 100.5  · Nausea and vomiting lasting longer than 4 hours or if unable to take medications  · Any signs of decreased circulation or nerve impairment to extremity: change in color, persistent numbness, tingling, coldness or increase pain  · Any questions    These are general instructions for a healthy lifestyle (if applicable): No smoking/ No tobacco products/ Avoid exposure to secondhand smoke  Surgeon General's Warning:  Quitting smoking now greatly reduces serious risk to your health. Obesity, smoking, and sedentary lifestyle greatly increases your risk for illness    A healthy diet, regular physical exercise & weight monitoring are important for maintaining a healthy lifestyle    You may be retaining fluid if you have a history of heart failure or if you experience any of the following symptoms:  Weight gain of 3 pounds or more overnight or 5 pounds in a week, increased swelling in our hands or feet or shortness of breath while lying flat in bed. Please call your doctor as soon as you notice any of these symptoms; do not wait until your next office visit. A common side effect of anesthesia following surgery is nausea and/or vomiting.  In order to decrease symptoms, it is wise to avoid foods that are high in fat, greasy foods, milk products, and spicy foods for the first 24 hours. Acceptable foods for the first 24 hours following surgery include but are not limited to:    · soup  · broth  ·  toast   · crackers   · applesauce  ·  bananas   · mashed potatoes,  · soft or scrambled eggs  · oatmeal  ·  jello    It is important to eat when taking your pain medication. This will help to prevent nausea. If possible, please try to time your meals with your medications. It is very important to stay hydrated following surgery. Sip fluids frequently while awake. Avoid acidic drinks such as citrus juices and soda for 24 hours. Carbonated beverages may cause bloating and gas. Acceptable fluids include:    · water (flavor packets may add variety)  · coffee or tea (in moderation)  · Gatorade  · Mika-Aid  · apple juice  · cranberry juice    You are encouraged to cough and deep breathe every hour when awake. This will help to prevent respiratory complications following anesthesia. You may want to hug a pillow when coughing and sneezing to add additional support to the surgical area and to decrease discomfort if you had abdominal or chest surgery. If you are discharged home with support stockings, you may remove them after 24 hours. Support stockings are used to help prevent blood clots in the legs following surgery. TO PREVENT AN INFECTION      1. [address] YOUR HANDS     To prevent infection, good handwashing is the most important thing you or your caregiver can do.  Wash your hands with soap and water or use the hand  we gave you before you touch any wounds. 2. SHOWER     Use the antibacterial soap we gave you when you take a shower.  Shower with this soap until your wounds are healed.  To reach all areas of your body, you may need someone to help you.  Dont forget to clean your belly button with every shower. 3.  USE CLEAN SHEETS     Use freshly cleaned sheets on your bed after surgery.       To keep the surgery site clean, do not allow pets to sleep with you while your wound is still healing. 4. STOP SMOKING     Stop smoking, or at least cut back on smoking     Smoking slows your healing. 5.  CONTROL YOUR BLOOD SUGAR     High blood sugars slow wound healing.  If you are diabetic, control your blood sugar levels before and after your surgery. Please take time to review all of your Home Care Instructions and Medication Information sheets provided in your discharge packet. If you have any questions, please contact your surgeon's office. Thank you. The discharge information has been reviewed with the patient and instruction recipient. The patient and instruction recipient verbalized understanding. Discharge medications reviewed with the patient and instruction recipient and appropriate educational materials and side effects teaching were provided. Patient Education      Hydrocodone/Acetaminophen (Vicodin, Norco, Lortab) - (By mouth)   Why this medicine is used:   Treats pain. Contact a nurse or doctor right away if you have:  · Blistering, peeling, red skin rash  · Fast or slow heartbeat, shallow breathing, blue lips, fingernails, or skin  · Anxiety, restlessness, muscle spasms, twitching, seeing or hearing things that are not there  · Dark urine or pale stools, yellow skin or eyes  · Extreme weakness, sweating, seizures, cold or clammy skin  · Lightheadedness, dizziness, fainting, fever, sweating     Common side effects:  · Constipation, nausea, vomiting, loss of appetite, stomach pain  · Tiredness or sleepiness  © 2017 [facility] Information is for End User's use only and may not be sold, redistributed or otherwise used for commercial purposes.

## 2022-04-13 NOTE — PERIOP NOTES
Handoff Report from Operating Room to PACU    Report received from [address] [place] [address] Sofía and PAT Dalton CRNA regarding Ely Jodee. Surgeon(s):  Elaine Brown MD  And Procedure(s) (LRB):  EXCISION SCALP SKIN CANCER AND ROTATION FLAP (N/A)  confirmed   with allergies, dressings and local anesthetic discussed. Anesthesia type, drugs, patient history, complications, estimated blood loss, vital signs, intake and output, and last pain medication, lines and temperature were reviewed.

## 2022-04-13 NOTE — ANESTHESIA POSTPROCEDURE EVALUATION
Procedure(s):  EXCISION SCALP SKIN CANCER AND ROTATION FLAP. MAC, general    Anesthesia Post Evaluation      Multimodal analgesia: multimodal analgesia used between 6 hours prior to anesthesia start to PACU discharge  Patient location during evaluation: PACU  Level of consciousness: sleepy but conscious and responsive to verbal stimuli  Pain score: 0  Pain management: adequate  Airway patency: patent  Anesthetic complications: no  Cardiovascular status: acceptable  Respiratory status: acceptable  Hydration status: acceptable  Comments: +Post-Anesthesia Evaluation and Assessment    Patient: Pacheco Arellano MRN: [id number]  SSN: [id number]   YOB: 1931  Age: 80 y.o. Sex: male      Cardiovascular Function/Vital Signs    /64   Pulse 78   Temp 36.5 °C (97.7 °F)   Resp 19   Ht 5' 7\" (1.702 m)   Wt 103.6 kg (228 lb 6.3 oz)   SpO2 95%   BMI 35.77 kg/m²     Patient is status post Procedure(s):  EXCISION SCALP SKIN CANCER AND ROTATION FLAP. Nausea/Vomiting: Controlled. Postoperative hydration reviewed and adequate. Pain:  Pain Scale 1: Numeric (0 - 10) (04/13/22 1117)  Pain Intensity 1: 0 (04/13/22 1117)   Managed. Neurological Status:   Neuro (WDL): Exceptions to WDL (04/13/22 1401)   At baseline. Mental Status and Level of Consciousness: Arousable. Pulmonary Status:   O2 Device: Nasal cannula (04/13/22 1401)   Adequate oxygenation and airway patent. Complications related to anesthesia: None    Post-anesthesia assessment completed. No concerns. Signed By: Meseret Esparza MD    4/13/2022  Post anesthesia nausea and vomiting:  controlled  Final Post Anesthesia Temperature Assessment:  Normothermia (36.0-37.5 degrees C)      INITIAL Post-op Vital signs:   Vitals Value Taken Time   /66 04/13/22 1430   Temp 36.5 °C (97.7 °F) 04/13/22 1401   Pulse 73 04/13/22 1433   Resp 22 04/13/22 1433   SpO2 95 % 04/13/22 1433   Vitals shown include unvalidated device data.

## 2022-04-13 NOTE — BRIEF OP NOTE
Brief Postoperative Note    Patient: Eve Ramirez  YOB: 1931  MRN: [id number]    Date of Procedure: 4/13/2022     Pre-Op Diagnosis: SCALP SKIN CANCER    Post-Op Diagnosis: Same as preoperative diagnosis.       Procedure(s):  EXCISION SCALP SCC 2.5 x 4cm with rotation flap reconstruction 6 x 7cm    Surgeon(s):  Eren Larsen MD    Surgical Assistant: None    Anesthesia: General     Estimated Blood Loss (mL): less than 50     Complications: None    Specimens:   ID Type Source Tests Collected by Time Destination   1 : scalp scamous cell carcinoma Frozen Section Scalp  Eren Larsen MD 4/13/2022 1253 Pathology        Implants: * No implants in log *    Drains: * No LDAs found *    Findings: see note    Electronically Signed by Florina Fabian MD on 4/13/2022 at 1:19 PM

## 2022-04-15 NOTE — OP NOTES
[address]  OPERATIVE REPORT    Name:  Urban Marinelli  MR#:  [id number]  :  10/03/1931  ACCOUNT #:  [de-identified]  DATE OF SERVICE:  2022    PREOPERATIVE DIAGNOSIS:  Squamous carcinoma of scalp. POSTOPERATIVE DIAGNOSIS:  Squamous carcinoma of scalp. PROCEDURES PERFORMED:  1. Excision of scalp vertex squamous carcinoma measuring 2.5 x 4 cm. 2.  Rotation flap reconstruction of scalp measuring 6 x 7 cm. SURGEON:  Florina Fabian MD    ASSISTANT:  None. ANESTHESIA:  General via laryngeal mask airway. COMPLICATIONS:  None. SPECIMENS REMOVED:  1.  Scalp squamous carcinoma. 2.  Posterior margin. IMPLANTS:  None. ESTIMATED BLOOD LOSS:  Less than 50 mL. INDICATIONS:  This 80-year-old male presented with biopsy-proven skin cancer as described that required excision and reconstruction. PROCEDURE:  After informed consent was obtained and under anesthesia provided by sedation and laryngeal mask airway, the operative site was infiltrated with 1% lidocaine with epinephrine and then prepped and draped. Under loupe magnification, the visible keratotic lesion of the scalp vertex was completely excised, leaving 5 mm margins peripherally and extending down to pericranium. The specimen was marked with a single stitch anteriorly and sent for frozen section, which showed free margins. However, there was some actinic change near the posterior margin, and an additional 5 mm rim of tissue was taken from this area. Attention was then turned to the reconstruction. This was a large defect that could not be closed primarily. A posterolaterally based rotation flap was designed and incised by making a curvilinear incision extending from the anterior aspect of the defect. The flap was sharply raised in the subgaleal plane, as were the margins of the defect. The galea of the flap was scored in a checkerboard fashion to allow it to expand in all directions.   Hemostasis was obtained and the wound was irrigated. The flap was rotated into position and secured with 3-0 Vicryl and 3-0 Prolene. The donor site was closed primarily in similar fashion. Antibiotic ointment and dry dressings were applied. He tolerated the procedure well and was transferred to the recovery room in good condition.       MD BRAD [email or web address]  D:  04/15/2022 9:50  T:  04/15/2022 11:11  JOB #:  [id number]  CC:  MD Brody Kumar MD

## 2022-11-23 ENCOUNTER — APPOINTMENT (OUTPATIENT)
Dept: GENERAL RADIOLOGY | Age: 87
End: 2022-11-23
Attending: EMERGENCY MEDICINE
Payer: MEDICARE

## 2022-11-23 ENCOUNTER — APPOINTMENT (OUTPATIENT)
Dept: CT IMAGING | Age: 87
End: 2022-11-23
Attending: EMERGENCY MEDICINE
Payer: MEDICARE

## 2022-11-23 ENCOUNTER — HOSPITAL ENCOUNTER (EMERGENCY)
Age: 87
Discharge: HOME | End: 2022-11-23
Attending: EMERGENCY MEDICINE
Payer: MEDICARE

## 2022-11-23 VITALS
DIASTOLIC BLOOD PRESSURE: 82 MMHG | HEIGHT: 67 IN | RESPIRATION RATE: 18 BRPM | SYSTOLIC BLOOD PRESSURE: 140 MMHG | TEMPERATURE: 98.4 F | OXYGEN SATURATION: 95 % | WEIGHT: 232.37 LBS | BODY MASS INDEX: 36.47 KG/M2 | HEART RATE: 74 BPM

## 2022-11-23 DIAGNOSIS — R53.83 FATIGUE, UNSPECIFIED TYPE: ICD-10-CM

## 2022-11-23 DIAGNOSIS — U07.1 COVID-19: Primary | ICD-10-CM

## 2022-11-23 LAB
ALBUMIN SERPL-MCNC: 3.1 G/DL (ref 3.5–5)
ALBUMIN/GLOB SERPL: 0.8 {RATIO} (ref 1.1–2.2)
ALP SERPL-CCNC: 52 U/L (ref 45–117)
ALT SERPL-CCNC: 45 U/L (ref 12–78)
ANION GAP SERPL CALC-SCNC: 5 MMOL/L (ref 5–15)
APPEARANCE UR: CLEAR
AST SERPL-CCNC: 26 U/L (ref 15–37)
BACTERIA URNS QL MICRO: NEGATIVE /HPF
BASOPHILS # BLD: 0 K/UL (ref 0–0.1)
BASOPHILS NFR BLD: 0 % (ref 0–1)
BILIRUB SERPL-MCNC: 0.4 MG/DL (ref 0.2–1)
BILIRUB UR QL: NEGATIVE
BUN SERPL-MCNC: 25 MG/DL (ref 6–20)
BUN/CREAT SERPL: 23 (ref 12–20)
CALCIUM SERPL-MCNC: 8.7 MG/DL (ref 8.5–10.1)
CHLORIDE SERPL-SCNC: 104 MMOL/L (ref 97–108)
CO2 SERPL-SCNC: 27 MMOL/L (ref 21–32)
COLOR UR: ABNORMAL
CREAT SERPL-MCNC: 1.07 MG/DL (ref 0.7–1.3)
D DIMER PPP FEU-MCNC: 5.09 MG/L FEU (ref 0–0.65)
DIFFERENTIAL METHOD BLD: ABNORMAL
EOSINOPHIL # BLD: 0.1 K/UL (ref 0–0.4)
EOSINOPHIL NFR BLD: 1 % (ref 0–7)
EPITH CASTS URNS QL MICRO: ABNORMAL /LPF
ERYTHROCYTE [DISTWIDTH] IN BLOOD BY AUTOMATED COUNT: 14.5 % (ref 11.5–14.5)
FLUAV AG NPH QL IA: NEGATIVE
FLUBV AG NOSE QL IA: NEGATIVE
GLOBULIN SER CALC-MCNC: 3.8 G/DL (ref 2–4)
GLUCOSE SERPL-MCNC: 117 MG/DL (ref 65–100)
GLUCOSE UR STRIP.AUTO-MCNC: NEGATIVE MG/DL
HCT VFR BLD AUTO: 44.6 % (ref 36.6–50.3)
HGB BLD-MCNC: 15.1 G/DL (ref 12.1–17)
HGB UR QL STRIP: NEGATIVE
IMM GRANULOCYTES # BLD AUTO: 0.1 K/UL (ref 0–0.04)
IMM GRANULOCYTES NFR BLD AUTO: 1 % (ref 0–0.5)
KETONES UR QL STRIP.AUTO: NEGATIVE MG/DL
LEUKOCYTE ESTERASE UR QL STRIP.AUTO: ABNORMAL
LYMPHOCYTES # BLD: 1.2 K/UL (ref 0.8–3.5)
LYMPHOCYTES NFR BLD: 11 % (ref 12–49)
MCH RBC QN AUTO: 31 PG (ref 26–34)
MCHC RBC AUTO-ENTMCNC: 33.9 G/DL (ref 30–36.5)
MCV RBC AUTO: 91.6 FL (ref 80–99)
MONOCYTES # BLD: 1.4 K/UL (ref 0–1)
MONOCYTES NFR BLD: 13 % (ref 5–13)
NEUTS SEG # BLD: 8.1 K/UL (ref 1.8–8)
NEUTS SEG NFR BLD: 74 % (ref 32–75)
NITRITE UR QL STRIP.AUTO: NEGATIVE
NRBC # BLD: 0 K/UL (ref 0–0.01)
NRBC BLD-RTO: 0 PER 100 WBC
PH UR STRIP: 5.5 [PH] (ref 5–8)
PLATELET # BLD AUTO: 196 K/UL (ref 150–400)
PMV BLD AUTO: 9.8 FL (ref 8.9–12.9)
POTASSIUM SERPL-SCNC: 4.1 MMOL/L (ref 3.5–5.1)
PROT SERPL-MCNC: 6.9 G/DL (ref 6.4–8.2)
PROT UR STRIP-MCNC: NEGATIVE MG/DL
RBC # BLD AUTO: 4.87 M/UL (ref 4.1–5.7)
RBC #/AREA URNS HPF: ABNORMAL /HPF (ref 0–5)
SODIUM SERPL-SCNC: 136 MMOL/L (ref 136–145)
SP GR UR REFRACTOMETRY: 1.02
TROPONIN-HIGH SENSITIVITY: 16 NG/L (ref 0–76)
TROPONIN-HIGH SENSITIVITY: 21 NG/L (ref 0–76)
UA: UC IF INDICATED,UAUC: ABNORMAL
UROBILINOGEN UR QL STRIP.AUTO: 0.2 EU/DL (ref 0.2–1)
WBC # BLD AUTO: 10.9 K/UL (ref 4.1–11.1)
WBC URNS QL MICRO: ABNORMAL /HPF (ref 0–4)

## 2022-11-23 PROCEDURE — 99285 EMERGENCY DEPT VISIT HI MDM: CPT

## 2022-11-23 PROCEDURE — 87077 CULTURE AEROBIC IDENTIFY: CPT

## 2022-11-23 PROCEDURE — 71275 CT ANGIOGRAPHY CHEST: CPT

## 2022-11-23 PROCEDURE — 87804 INFLUENZA ASSAY W/OPTIC: CPT

## 2022-11-23 PROCEDURE — 87186 SC STD MICRODIL/AGAR DIL: CPT

## 2022-11-23 PROCEDURE — 84484 ASSAY OF TROPONIN QUANT: CPT

## 2022-11-23 PROCEDURE — 87086 URINE CULTURE/COLONY COUNT: CPT

## 2022-11-23 PROCEDURE — 74011000636 HC RX REV CODE- 636: Performed by: EMERGENCY MEDICINE

## 2022-11-23 PROCEDURE — 71045 X-RAY EXAM CHEST 1 VIEW: CPT

## 2022-11-23 PROCEDURE — 85379 FIBRIN DEGRADATION QUANT: CPT

## 2022-11-23 PROCEDURE — 85025 COMPLETE CBC W/AUTO DIFF WBC: CPT

## 2022-11-23 PROCEDURE — 80053 COMPREHEN METABOLIC PANEL: CPT

## 2022-11-23 PROCEDURE — 36415 COLL VENOUS BLD VENIPUNCTURE: CPT

## 2022-11-23 PROCEDURE — 93005 ELECTROCARDIOGRAM TRACING: CPT

## 2022-11-23 PROCEDURE — 81001 URINALYSIS AUTO W/SCOPE: CPT

## 2022-11-23 RX ADMIN — IOPAMIDOL 100 ML: 755 INJECTION, SOLUTION INTRAVENOUS at 19:40

## 2022-11-23 NOTE — ED PROVIDER NOTES
EMERGENCY DEPARTMENT HISTORY AND PHYSICAL EXAM      Date: 11/23/2022  Patient Name: Junior Mariee  Patient Age and Sex: 80 y.o. male     History of Presenting Illness     Chief Complaint   Patient presents with    Post-COVID Symptoms     Patient presents to triage ambulatory complaining of fatigue after completing his COVID medication. Patient tested positive last week. Patient denies any pain, shortness of breath, fever or chills at this time. History Provided By: Patient    HPI: Junior Mariee Is a 19-year-old history CHF hypertension CAD, former tobacco abuse presenting with fatigue. Patient states for the past week approximately he has had significant fatigue with activity. Reports intermittent right-sided chest pain as well as intermittent dyspnea. He denies any chest pain at time presentation and no dyspnea. He reports he took Paxlovid, completed his course of this. He tested negative for COVID earlier this week after testing positive last week. There are no other complaints, changes, or physical findings at this time. PCP: Jason Kaufman MD    No current facility-administered medications on file prior to encounter. Current Outpatient Medications on File Prior to Encounter   Medication Sig Dispense Refill    metoprolol succinate (TOPROL-XL) 50 mg XL tablet Take  by mouth daily. Takes 1/4 tab daily      saw palmetto 450 mg cap Take 450 mg by mouth two (2) times a day.  nitroglycerin (NITROSTAT) 0.4 mg SL tablet TAKE ONE TABLET EVERY 5 MIN FOR CHEST PAIN X 3      acetaminophen-codeine (TYLENOL-CODEINE #3) 300-30 mg per tablet Take 1 Tablet by mouth every six (6) hours as needed for Pain.  acetaminophen (TYLENOL) 500 mg tablet Take 1,000 mg by mouth every six (6) hours as needed for Pain.  tamsulosin (FLOMAX) 0.4 mg capsule Take 0.4 mg by mouth daily.  enalapril (VASOTEC) 10 mg tablet Take 10 mg by mouth daily.          Past History     Past Medical History:  Past Medical History:   Diagnosis Date    Arthritis     back, legs    CAD (coronary artery disease)     no stents    Chest pain, unspecified     Congestive heart failure (HCC)     Essential hypertension     Essential hypertension, benign     GERD (gastroesophageal reflux disease)     diet controlled    Hypertension     Lung cancer ([facility] [address]     lf lung 2014    Mixed hyperlipidemia     Pneumonia 2022    Recurrent kidney stones     [facility]    Right leg     Past Surgical History:  Past Surgical History:   Procedure Laterality Date    HX APPENDECTOMY      HX HEART CATHETERIZATION      HX HEENT      tonsillectomy    HX ORTHOPAEDIC      back surgery x2 , right shoulder surgery    HX ORTHOPAEDIC Bilateral     bilateral knee replacement    HX OTHER SURGICAL      neck or cancer removal    TN CHEST SURGERY PROCEDURE UNLISTED  9/3/13    left thoracoscopy; left upper lobectomy; left pericostal nerve block; lymph node dissection       Family History:  Family History   Problem Relation Age of Onset    Diabetes Brother     Heart Disease Brother        Social History:  Social History     Tobacco Use    Smoking status: Former     Packs/day: 1.00     Years: 20.00     Pack years: 20.00     Types: Cigarettes     Quit date: 1985     Years since quittin.8    Smokeless tobacco: Never   Vaping Use    Vaping Use: Never used   Substance Use Topics    Alcohol use: No     Comment: not since     Drug use: No     Allergies: Allergies   Allergen Reactions    Ciprofloxacin Swelling     In hands and feet     Review of Systems   Review of Systems   Constitutional:  Negative for chills and fever. HENT:  Negative for congestion and rhinorrhea. Respiratory:  Positive for shortness of breath. Cardiovascular:  Positive for chest pain. Gastrointestinal:  Negative for abdominal pain, diarrhea, nausea and vomiting. Genitourinary:  Negative for dysuria and frequency. Musculoskeletal:  Negative for myalgias. Skin:  Negative for rash. Neurological:  Negative for weakness and numbness. All other systems reviewed and are negative. Physical Exam   Physical Exam  Vitals and nursing note reviewed. Constitutional:       General: He is not in acute distress. Appearance: Normal appearance. He is obese. He is not ill-appearing. HENT:      Head: Normocephalic and atraumatic. Mouth/Throat:      Mouth: Mucous membranes are moist.   Eyes:      Conjunctiva/sclera: Conjunctivae normal.   Cardiovascular:      Rate and Rhythm: Normal rate and regular rhythm. Pulses: Normal pulses. Heart sounds: Normal heart sounds. Pulmonary:      Effort: Pulmonary effort is normal.      Comments: End expiratory wheezing noted in all lung fields posteriorly  Abdominal:      General: Abdomen is flat. Palpations: Abdomen is soft. Tenderness: There is no abdominal tenderness. Musculoskeletal:         General: No deformity. Right lower leg: No edema. Left lower leg: No edema. Skin:     General: Skin is warm and dry. Neurological:      Mental Status: He is alert and oriented to person, place, and time. Mental status is at baseline. Psychiatric:         Behavior: Behavior normal.         Thought Content:  Thought content normal.        Diagnostic Study Results     Labs -     Recent Results (from the past 12 hour(s))   EKG, 12 LEAD, INITIAL    Collection Time: 11/23/22  4:41 PM   Result Value Ref Range    Ventricular Rate 67 BPM    Atrial Rate 67 BPM    P-R Interval 210 ms    QRS Duration 166 ms    Q-T Interval 454 ms    QTC Calculation (Bezet) 479 ms    Calculated P Axis 29 degrees    Calculated R Axis -77 degrees    Calculated T Axis 30 degrees    Diagnosis       Sinus rhythm with 1st degree AV block  Right bundle branch block  Left anterior fascicular block  ** Bifascicular block **  Voltage criteria for left ventricular hypertrophy  Cannot rule out Septal infarct (cited on or before 23-NOV-2022)  When compared with ECG of 04-JAN-2022 19:48,  Serial changes of Septal infarct present     CBC WITH AUTOMATED DIFF    Collection Time: 11/23/22  4:55 PM   Result Value Ref Range    WBC 10.9 4.1 - 11.1 K/uL    RBC 4.87 4.10 - 5.70 M/uL    HGB 15.1 12.1 - 17.0 g/dL    HCT 44.6 36.6 - 50.3 %    MCV 91.6 80.0 - 99.0 FL    MCH 31.0 26.0 - 34.0 PG    MCHC 33.9 30.0 - 36.5 g/dL    RDW 14.5 11.5 - 14.5 %    PLATELET 660 022 - 396 K/uL    MPV 9.8 8.9 - 12.9 FL    NRBC 0.0 0  WBC    ABSOLUTE NRBC 0.00 0.00 - 0.01 K/uL    NEUTROPHILS 74 32 - 75 %    LYMPHOCYTES 11 (L) 12 - 49 %    MONOCYTES 13 5 - 13 %    EOSINOPHILS 1 0 - 7 %    BASOPHILS 0 0 - 1 %    IMMATURE GRANULOCYTES 1 (H) 0.0 - 0.5 %    ABS. NEUTROPHILS 8.1 (H) 1.8 - 8.0 K/UL    ABS. LYMPHOCYTES 1.2 0.8 - 3.5 K/UL    ABS. MONOCYTES 1.4 (H) 0.0 - 1.0 K/UL    ABS. EOSINOPHILS 0.1 0.0 - 0.4 K/UL    ABS. BASOPHILS 0.0 0.0 - 0.1 K/UL    ABS. IMM. GRANS. 0.1 (H) 0.00 - 0.04 K/UL    DF AUTOMATED     METABOLIC PANEL, COMPREHENSIVE    Collection Time: 11/23/22  4:55 PM   Result Value Ref Range    Sodium 136 136 - 145 mmol/L    Potassium 4.1 3.5 - 5.1 mmol/L    Chloride 104 97 - 108 mmol/L    CO2 27 21 - 32 mmol/L    Anion gap 5 5 - 15 mmol/L    Glucose 117 (H) 65 - 100 mg/dL    BUN 25 (H) 6 - 20 MG/DL    Creatinine 1.07 0.70 - 1.30 MG/DL    BUN/Creatinine ratio 23 (H) 12 - 20      eGFR >60 >60 ml/min/1.73m2    Calcium 8.7 8.5 - 10.1 MG/DL    Bilirubin, total 0.4 0.2 - 1.0 MG/DL    ALT (SGPT) 45 12 - 78 U/L    AST (SGOT) 26 15 - 37 U/L    Alk.  phosphatase 52 45 - 117 U/L    Protein, total 6.9 6.4 - 8.2 g/dL    Albumin 3.1 (L) 3.5 - 5.0 g/dL    Globulin 3.8 2.0 - 4.0 g/dL    A-G Ratio 0.8 (L) 1.1 - 2.2     TROPONIN-HIGH SENSITIVITY    Collection Time: 11/23/22  4:55 PM   Result Value Ref Range    Troponin-High Sensitivity 16 0 - 76 ng/L   URINALYSIS W/ REFLEX CULTURE    Collection Time: 11/23/22  4:55 PM    Specimen: Urine Result Value Ref Range    Color YELLOW/STRAW      Appearance CLEAR CLEAR      Specific gravity 1.018      pH (UA) 5.5 5.0 - 8.0      Protein Negative NEG mg/dL    Glucose Negative NEG mg/dL    Ketone Negative NEG mg/dL    Bilirubin Negative NEG      Blood Negative NEG      Urobilinogen 0.2 0.2 - 1.0 EU/dL    Nitrites Negative NEG      Leukocyte Esterase LARGE (A) NEG      WBC 10-20 0 - 4 /hpf    RBC 5-10 0 - 5 /hpf    Epithelial cells MODERATE (A) FEW /lpf    Bacteria Negative NEG /hpf    UA:UC IF INDICATED URINE CULTURE ORDERED (A) CNI     D DIMER    Collection Time: 11/23/22  4:59 PM   Result Value Ref Range    D-dimer 5.09 (H) 0.00 - 0.65 mg/L FEU   INFLUENZA A+B VIRAL AGS    Collection Time: 11/23/22  5:15 PM   Result Value Ref Range    Influenza A Antigen Negative NEG      Influenza B Antigen Negative NEG     TROPONIN-HIGH SENSITIVITY    Collection Time: 11/23/22  7:58 PM   Result Value Ref Range    Troponin-High Sensitivity 21 0 - 76 ng/L     Radiologic Studies  CTA CHEST W OR W WO CONT   Final Result   1. No visualized pulmonary embolus. 2. Status post left upper lobe lobectomy. 3. Incidental findings as above. XR CHEST PORT   Final Result   No Acute Disease. CT Results  (Last 48 hours)                 11/23/22 1939  CTA CHEST W OR W WO CONT Final result    Impression:  1. No visualized pulmonary embolus. 2. Status post left upper lobe lobectomy. 3. Incidental findings as above. Narrative:  EXAM:  CTA CHEST W OR W WO CONT   INDICATION:  None provided. Additional history:   COMPARISON: CT of the chest, 1/4/2022 and 6/1/2017. [employer] TECHNIQUE:    Precontrast  images were obtained to localize the volume for acquisition. Multislice helical CT arteriography was performed from the diaphragm to the   thoracic inlet during uneventful rapid bolus intravenous contrast   administration. Lung and soft tissue windows were generated.   Coronal and   sagittal images were generated and 3D post processing consisting of coronal   maximum intensity images was performed. CT dose reduction was achieved through use of a standardized protocol tailored   for this examination and automatic exposure control for dose modulation. Calixto Amend FINDINGS:   CHEST:   Chest wall/thoracic inlet: Within normal limits. Thyroid: Within normal limits. Mediastinum/christine: Patulous esophagus. Heart/vessels: No visible pulmonary embolus. Calcifications throughout the   aortic valve. Lungs/Pleura: Status post left upper lobe lobectomy. Emphysema. .   INCIDENTALLY IMAGED ABDOMEN:   Pancreatic atrophy diverticulosis in the large bowel    . MSK:    Degenerative changes throughout the spine. 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 .             CXR Results  (Last 48 hours)                 11/23/22 1702  XR CHEST PORT Final result    Impression:  No Acute Disease. Narrative:  EXAM: Portable CXR. 1656 hours         INDICATION: dyspnea       FINDINGS:   The lungs appear clear. Heart is normal in size. There is no pulmonary edema. There is no evident pneumothorax or pleural effusion. Medical Decision Making   I am the first provider for this patient. I reviewed the vital signs, available nursing notes, past medical history, past surgical history, family history and social history. Vital Signs-Reviewed the patient's vital signs.   Patient Vitals for the past 12 hrs:   Temp Pulse Resp BP SpO2   11/23/22 1956 98.4 °F (36.9 °C) 74 18 (!) 140/82 95 %   11/23/22 1729 -- 65 16 (!) 140/88 96 %   11/23/22 1700 -- -- -- -- 95 %   11/23/22 1559 98.4 °F (36.9 °C) 82 18 (!) 156/75 98 %       Records Reviewed: Nursing Notes and Old Medical Records    Provider Notes (Medical Decision Making):   Ddx PE, superimposed bacterial pneumonia, ongoing COVID pneumonia, metabolic changes including GIOVANNY, hyponatremia    Will obtain CBC, CMP, d dimer, CXR, EKG troponin. ED Course:   Initial assessment performed. The patients presenting problems have been discussed, and they are in agreement with the care plan formulated and outlined with them. I have encouraged them to ask questions as they arise throughout their visit. ED Course as of 11/23/22 2313   Wed Nov 23, 2022   1646 Low risk of PE, not tachycardic not tachypneic O2 sats 90% however patient with history of thromboembolism in the past, with chest pain and dyspnea will obtain D-dimer [WB]   1701 EKG shows sinus rhythm at a rate of 67 left axis with right bundle and left anterior fascicular block, first-degree AV block with a TN interval of 210. No ischemic changes. Compared EKG January of this year, it is unchanged apart from mildly increased TN interval however bifascicular block noted at that time [WB]   1713 Chest x-ray shows clear lungs, normal heart size no pulmonary edema no effusion [WB]   1745 D-dimer 5, flu negative troponin 16 UA negative will obtain CTA chest [WB]   1919 UA shows large leukocyte esterase, moderate epithelial cells with 10-20 white blood cells no urinary symptoms, sent for culture, will discuss empiric treatment with family [WB]   2151 As discussed with patient and family, asking to defer treatment for UTI at this time will await culture results [WB]      ED Course User Index  [WB] Vicente Rajan MD     Disposition:  Discharge Note:  The patient has been re-evaluated and is ready for discharge. Reviewed available results with patient. Counseled patient on diagnosis and care plan. Patient has expressed understanding, and all questions have been answered. Patient agrees with plan and agrees to follow up as recommended, or to return to the ED if their symptoms worsen. Discharge instructions have been provided and explained to the patient, along with reasons to return to the ED. PLAN:  Discharge Medication List as of 11/23/2022  9:51 PM        2. Follow-up Information       Follow up With Specialties Details Why Contact Info    [facility] EMERGENCY DEPT Emergency Medicine  As needed, If symptoms worsen [address] [place] [address] Drive  [address] [facility]  [phone]          3. Return to ED if worse     Diagnosis     Clinical Impression:   1. COVID-19    2. Fatigue, unspecified type        Attestations:    Tammy Stahl M.D. Please note that this dictation was completed with [employer], the computer voice recognition software. Quite often unanticipated grammatical, syntax, homophones, and other interpretive errors are inadvertently transcribed by the computer software. Please disregard these errors. Please excuse any errors that have escaped final proofreading. Thank you.

## 2022-11-24 LAB
ATRIAL RATE: 67 BPM
CALCULATED P AXIS, ECG09: 29 DEGREES
CALCULATED R AXIS, ECG10: -77 DEGREES
CALCULATED T AXIS, ECG11: 30 DEGREES
DIAGNOSIS, 93000: NORMAL
P-R INTERVAL, ECG05: 210 MS
Q-T INTERVAL, ECG07: 454 MS
QRS DURATION, ECG06: 166 MS
QTC CALCULATION (BEZET), ECG08: 479 MS
VENTRICULAR RATE, ECG03: 67 BPM

## 2022-11-26 LAB
BACTERIA SPEC CULT: ABNORMAL
BACTERIA SPEC CULT: ABNORMAL
CC UR VC: ABNORMAL
SERVICE CMNT-IMP: ABNORMAL

## 2022-11-29 RX ORDER — CEPHALEXIN 500 MG/1
500 CAPSULE ORAL 2 TIMES DAILY
Qty: 14 CAPSULE | Refills: 0 | Status: SHIPPED | OUTPATIENT
Start: 2022-11-29 | End: 2022-12-06

## 2022-11-29 NOTE — PROGRESS NOTES
No abx. Called and left voicemail with patient's son/daughter in law as listed under instructions in the chart.

## 2023-10-17 ENCOUNTER — APPOINTMENT (OUTPATIENT)
Facility: HOSPITAL | Age: 88
DRG: 243 | End: 2023-10-17
Payer: MEDICARE

## 2023-10-17 ENCOUNTER — HOSPITAL ENCOUNTER (INPATIENT)
Facility: HOSPITAL | Age: 88
LOS: 3 days | Discharge: HOME | DRG: 243 | End: 2023-10-20
Attending: EMERGENCY MEDICINE | Admitting: STUDENT IN AN ORGANIZED HEALTH CARE EDUCATION/TRAINING PROGRAM
Payer: MEDICARE

## 2023-10-17 DIAGNOSIS — R00.1 BRADYCARDIA: Primary | ICD-10-CM

## 2023-10-17 DIAGNOSIS — T82.9XXA DISORDER OF CARDIAC PACEMAKER SYSTEM, INITIAL ENCOUNTER: ICD-10-CM

## 2023-10-17 DIAGNOSIS — T82.9XXA PACEMAKER COMPLICATIONS: ICD-10-CM

## 2023-10-17 LAB
ALBUMIN SERPL-MCNC: 3.4 G/DL (ref 3.5–5)
ALBUMIN/GLOB SERPL: 0.9 (ref 1.1–2.2)
ALP SERPL-CCNC: 58 U/L (ref 45–117)
ALT SERPL-CCNC: 28 U/L (ref 12–78)
ANION GAP SERPL CALC-SCNC: 3 MMOL/L (ref 5–15)
AST SERPL-CCNC: 18 U/L (ref 15–37)
BASOPHILS # BLD: 0 K/UL (ref 0–0.1)
BASOPHILS NFR BLD: 1 % (ref 0–1)
BILIRUB SERPL-MCNC: 0.6 MG/DL (ref 0.2–1)
BUN SERPL-MCNC: 28 MG/DL (ref 6–20)
BUN/CREAT SERPL: 25 (ref 12–20)
CALCIUM SERPL-MCNC: 9.2 MG/DL (ref 8.5–10.1)
CHLORIDE SERPL-SCNC: 105 MMOL/L (ref 97–108)
CO2 SERPL-SCNC: 28 MMOL/L (ref 21–32)
CREAT SERPL-MCNC: 1.13 MG/DL (ref 0.7–1.3)
DIFFERENTIAL METHOD BLD: ABNORMAL
EOSINOPHIL # BLD: 0.1 K/UL (ref 0–0.4)
EOSINOPHIL NFR BLD: 1 % (ref 0–7)
ERYTHROCYTE [DISTWIDTH] IN BLOOD BY AUTOMATED COUNT: 15 % (ref 11.5–14.5)
GLOBULIN SER CALC-MCNC: 4 G/DL (ref 2–4)
GLUCOSE SERPL-MCNC: 127 MG/DL (ref 65–100)
HCT VFR BLD AUTO: 48.3 % (ref 36.6–50.3)
HGB BLD-MCNC: 15.8 G/DL (ref 12.1–17)
IMM GRANULOCYTES # BLD AUTO: 0 K/UL (ref 0–0.04)
IMM GRANULOCYTES NFR BLD AUTO: 0 % (ref 0–0.5)
LYMPHOCYTES # BLD: 1.6 K/UL (ref 0.8–3.5)
LYMPHOCYTES NFR BLD: 20 % (ref 12–49)
MCH RBC QN AUTO: 31.5 PG (ref 26–34)
MCHC RBC AUTO-ENTMCNC: 32.7 G/DL (ref 30–36.5)
MCV RBC AUTO: 96.2 FL (ref 80–99)
MONOCYTES # BLD: 0.8 K/UL (ref 0–1)
MONOCYTES NFR BLD: 10 % (ref 5–13)
NEUTS SEG # BLD: 5.5 K/UL (ref 1.8–8)
NEUTS SEG NFR BLD: 68 % (ref 32–75)
NRBC # BLD: 0 K/UL (ref 0–0.01)
NRBC BLD-RTO: 0 PER 100 WBC
NT PRO BNP: 610 PG/ML
PLATELET # BLD AUTO: 196 K/UL (ref 150–400)
PMV BLD AUTO: 10.4 FL (ref 8.9–12.9)
POTASSIUM SERPL-SCNC: 4.5 MMOL/L (ref 3.5–5.1)
PROT SERPL-MCNC: 7.4 G/DL (ref 6.4–8.2)
RBC # BLD AUTO: 5.02 M/UL (ref 4.1–5.7)
SODIUM SERPL-SCNC: 136 MMOL/L (ref 136–145)
TROPONIN I SERPL HS-MCNC: 45 NG/L (ref 0–76)
WBC # BLD AUTO: 8.1 K/UL (ref 4.1–11.1)

## 2023-10-17 PROCEDURE — 99285 EMERGENCY DEPT VISIT HI MDM: CPT

## 2023-10-17 PROCEDURE — 36415 COLL VENOUS BLD VENIPUNCTURE: CPT

## 2023-10-17 PROCEDURE — 85025 COMPLETE CBC W/AUTO DIFF WBC: CPT

## 2023-10-17 PROCEDURE — 71045 X-RAY EXAM CHEST 1 VIEW: CPT

## 2023-10-17 PROCEDURE — 84484 ASSAY OF TROPONIN QUANT: CPT

## 2023-10-17 PROCEDURE — 2580000003 HC RX 258: Performed by: STUDENT IN AN ORGANIZED HEALTH CARE EDUCATION/TRAINING PROGRAM

## 2023-10-17 PROCEDURE — 70450 CT HEAD/BRAIN W/O DYE: CPT

## 2023-10-17 PROCEDURE — 93005 ELECTROCARDIOGRAM TRACING: CPT | Performed by: STUDENT IN AN ORGANIZED HEALTH CARE EDUCATION/TRAINING PROGRAM

## 2023-10-17 PROCEDURE — 2060000000 HC ICU INTERMEDIATE R&B

## 2023-10-17 PROCEDURE — 6370000000 HC RX 637 (ALT 250 FOR IP): Performed by: STUDENT IN AN ORGANIZED HEALTH CARE EDUCATION/TRAINING PROGRAM

## 2023-10-17 PROCEDURE — 80053 COMPREHEN METABOLIC PANEL: CPT

## 2023-10-17 PROCEDURE — 83880 ASSAY OF NATRIURETIC PEPTIDE: CPT

## 2023-10-17 RX ORDER — LISINOPRIL 10 MG/1
10 TABLET ORAL DAILY
Status: DISCONTINUED | OUTPATIENT
Start: 2023-10-17 | End: 2023-10-20 | Stop reason: HOSPADM

## 2023-10-17 RX ORDER — SODIUM CHLORIDE 0.9 % (FLUSH) 0.9 %
5-40 SYRINGE (ML) INJECTION EVERY 12 HOURS SCHEDULED
Status: DISCONTINUED | OUTPATIENT
Start: 2023-10-17 | End: 2023-10-20 | Stop reason: HOSPADM

## 2023-10-17 RX ORDER — ONDANSETRON 4 MG/1
4 TABLET, ORALLY DISINTEGRATING ORAL EVERY 8 HOURS PRN
Status: DISCONTINUED | OUTPATIENT
Start: 2023-10-17 | End: 2023-10-20 | Stop reason: HOSPADM

## 2023-10-17 RX ORDER — ACETAMINOPHEN 325 MG/1
650 TABLET ORAL EVERY 6 HOURS PRN
Status: DISCONTINUED | OUTPATIENT
Start: 2023-10-17 | End: 2023-10-20 | Stop reason: HOSPADM

## 2023-10-17 RX ORDER — HYDRALAZINE HYDROCHLORIDE 20 MG/ML
10 INJECTION INTRAMUSCULAR; INTRAVENOUS EVERY 6 HOURS PRN
Status: DISCONTINUED | OUTPATIENT
Start: 2023-10-17 | End: 2023-10-18 | Stop reason: HOSPADM

## 2023-10-17 RX ORDER — POLYETHYLENE GLYCOL 3350 17 G/17G
17 POWDER, FOR SOLUTION ORAL DAILY PRN
Status: DISCONTINUED | OUTPATIENT
Start: 2023-10-17 | End: 2023-10-20 | Stop reason: HOSPADM

## 2023-10-17 RX ORDER — SODIUM CHLORIDE 9 MG/ML
INJECTION, SOLUTION INTRAVENOUS PRN
Status: DISCONTINUED | OUTPATIENT
Start: 2023-10-17 | End: 2023-10-20 | Stop reason: HOSPADM

## 2023-10-17 RX ORDER — TAMSULOSIN HYDROCHLORIDE 0.4 MG/1
0.4 CAPSULE ORAL DAILY
Status: DISCONTINUED | OUTPATIENT
Start: 2023-10-17 | End: 2023-10-20 | Stop reason: HOSPADM

## 2023-10-17 RX ORDER — ACETAMINOPHEN 650 MG/1
650 SUPPOSITORY RECTAL EVERY 6 HOURS PRN
Status: DISCONTINUED | OUTPATIENT
Start: 2023-10-17 | End: 2023-10-20 | Stop reason: HOSPADM

## 2023-10-17 RX ORDER — SODIUM CHLORIDE 0.9 % (FLUSH) 0.9 %
5-40 SYRINGE (ML) INJECTION PRN
Status: DISCONTINUED | OUTPATIENT
Start: 2023-10-17 | End: 2023-10-20 | Stop reason: HOSPADM

## 2023-10-17 RX ORDER — ONDANSETRON 2 MG/ML
4 INJECTION INTRAMUSCULAR; INTRAVENOUS EVERY 6 HOURS PRN
Status: DISCONTINUED | OUTPATIENT
Start: 2023-10-17 | End: 2023-10-20 | Stop reason: HOSPADM

## 2023-10-17 RX ADMIN — SODIUM CHLORIDE, PRESERVATIVE FREE 10 ML: 5 INJECTION INTRAVENOUS at 22:06

## 2023-10-17 ASSESSMENT — LIFESTYLE VARIABLES
HOW MANY STANDARD DRINKS CONTAINING ALCOHOL DO YOU HAVE ON A TYPICAL DAY: 1 OR 2
HOW OFTEN DO YOU HAVE A DRINK CONTAINING ALCOHOL: MONTHLY OR LESS

## 2023-10-17 ASSESSMENT — PAIN DESCRIPTION - LOCATION: LOCATION: CHEST

## 2023-10-17 ASSESSMENT — PAIN SCALES - GENERAL
PAINLEVEL_OUTOF10: 0

## 2023-10-17 NOTE — ED NOTES
Report given to Carrie Saeed by Wilberto Alba. Nurse was informed of reason for arrival, vitals, labs, medications, orders, procedures, results, anything left pending and current plan of action. Questions were asked and received prior to departure from the patient.        Gareth Centeno RN  10/17/23 8920

## 2023-10-18 ENCOUNTER — APPOINTMENT (OUTPATIENT)
Facility: HOSPITAL | Age: 88
DRG: 243 | End: 2023-10-18
Payer: MEDICARE

## 2023-10-18 PROBLEM — I44.1 MOBITZ II: Chronic | Status: ACTIVE | Noted: 2023-10-18

## 2023-10-18 LAB
ANION GAP SERPL CALC-SCNC: 5 MMOL/L (ref 5–15)
BASOPHILS # BLD: 0.1 K/UL (ref 0–0.1)
BASOPHILS NFR BLD: 1 % (ref 0–1)
BUN SERPL-MCNC: 23 MG/DL (ref 6–20)
BUN/CREAT SERPL: 21 (ref 12–20)
CALCIUM SERPL-MCNC: 8.8 MG/DL (ref 8.5–10.1)
CHLORIDE SERPL-SCNC: 106 MMOL/L (ref 97–108)
CO2 SERPL-SCNC: 27 MMOL/L (ref 21–32)
CREAT SERPL-MCNC: 1.08 MG/DL (ref 0.7–1.3)
DIFFERENTIAL METHOD BLD: ABNORMAL
ECHO BSA: 2.27 M2
EOSINOPHIL # BLD: 0.1 K/UL (ref 0–0.4)
EOSINOPHIL NFR BLD: 1 % (ref 0–7)
ERYTHROCYTE [DISTWIDTH] IN BLOOD BY AUTOMATED COUNT: 15 % (ref 11.5–14.5)
GLUCOSE SERPL-MCNC: 120 MG/DL (ref 65–100)
HCT VFR BLD AUTO: 48.3 % (ref 36.6–50.3)
HGB BLD-MCNC: 15.5 G/DL (ref 12.1–17)
IMM GRANULOCYTES # BLD AUTO: 0 K/UL (ref 0–0.04)
IMM GRANULOCYTES NFR BLD AUTO: 1 % (ref 0–0.5)
LYMPHOCYTES # BLD: 2 K/UL (ref 0.8–3.5)
LYMPHOCYTES NFR BLD: 25 % (ref 12–49)
MCH RBC QN AUTO: 30.4 PG (ref 26–34)
MCHC RBC AUTO-ENTMCNC: 32.1 G/DL (ref 30–36.5)
MCV RBC AUTO: 94.7 FL (ref 80–99)
MONOCYTES # BLD: 0.9 K/UL (ref 0–1)
MONOCYTES NFR BLD: 10 % (ref 5–13)
NEUTS SEG # BLD: 5.2 K/UL (ref 1.8–8)
NEUTS SEG NFR BLD: 62 % (ref 32–75)
NRBC # BLD: 0 K/UL (ref 0–0.01)
NRBC BLD-RTO: 0 PER 100 WBC
PLATELET # BLD AUTO: 190 K/UL (ref 150–400)
PMV BLD AUTO: 10.8 FL (ref 8.9–12.9)
POTASSIUM SERPL-SCNC: 4.2 MMOL/L (ref 3.5–5.1)
RBC # BLD AUTO: 5.1 M/UL (ref 4.1–5.7)
SODIUM SERPL-SCNC: 138 MMOL/L (ref 136–145)
TSH SERPL DL<=0.05 MIU/L-ACNC: 2.34 UIU/ML (ref 0.36–3.74)
WBC # BLD AUTO: 8.3 K/UL (ref 4.1–11.1)

## 2023-10-18 PROCEDURE — C1894 INTRO/SHEATH, NON-LASER: HCPCS | Performed by: INTERNAL MEDICINE

## 2023-10-18 PROCEDURE — 02HK3JZ INSERTION OF PACEMAKER LEAD INTO RIGHT VENTRICLE, PERCUTANEOUS APPROACH: ICD-10-PCS | Performed by: INTERNAL MEDICINE

## 2023-10-18 PROCEDURE — 84443 ASSAY THYROID STIM HORMONE: CPT

## 2023-10-18 PROCEDURE — 99153 MOD SED SAME PHYS/QHP EA: CPT | Performed by: INTERNAL MEDICINE

## 2023-10-18 PROCEDURE — 93005 ELECTROCARDIOGRAM TRACING: CPT | Performed by: STUDENT IN AN ORGANIZED HEALTH CARE EDUCATION/TRAINING PROGRAM

## 2023-10-18 PROCEDURE — C1785 PMKR, DUAL, RATE-RESP: HCPCS | Performed by: INTERNAL MEDICINE

## 2023-10-18 PROCEDURE — 36415 COLL VENOUS BLD VENIPUNCTURE: CPT

## 2023-10-18 PROCEDURE — 6360000002 HC RX W HCPCS: Performed by: INTERNAL MEDICINE

## 2023-10-18 PROCEDURE — 6370000000 HC RX 637 (ALT 250 FOR IP): Performed by: STUDENT IN AN ORGANIZED HEALTH CARE EDUCATION/TRAINING PROGRAM

## 2023-10-18 PROCEDURE — 2709999900 HC NON-CHARGEABLE SUPPLY: Performed by: INTERNAL MEDICINE

## 2023-10-18 PROCEDURE — 2700000000 HC OXYGEN THERAPY PER DAY

## 2023-10-18 PROCEDURE — 99152 MOD SED SAME PHYS/QHP 5/>YRS: CPT | Performed by: INTERNAL MEDICINE

## 2023-10-18 PROCEDURE — 2580000003 HC RX 258: Performed by: INTERNAL MEDICINE

## 2023-10-18 PROCEDURE — A4217 STERILE WATER/SALINE, 500 ML: HCPCS | Performed by: INTERNAL MEDICINE

## 2023-10-18 PROCEDURE — 2500000003 HC RX 250 WO HCPCS: Performed by: INTERNAL MEDICINE

## 2023-10-18 PROCEDURE — 33208 INSRT HEART PM ATRIAL & VENT: CPT | Performed by: INTERNAL MEDICINE

## 2023-10-18 PROCEDURE — 80048 BASIC METABOLIC PNL TOTAL CA: CPT

## 2023-10-18 PROCEDURE — 0JH606Z INSERTION OF PACEMAKER, DUAL CHAMBER INTO CHEST SUBCUTANEOUS TISSUE AND FASCIA, OPEN APPROACH: ICD-10-PCS | Performed by: INTERNAL MEDICINE

## 2023-10-18 PROCEDURE — 85025 COMPLETE CBC W/AUTO DIFF WBC: CPT

## 2023-10-18 PROCEDURE — 2580000003 HC RX 258: Performed by: STUDENT IN AN ORGANIZED HEALTH CARE EDUCATION/TRAINING PROGRAM

## 2023-10-18 PROCEDURE — 02H63JZ INSERTION OF PACEMAKER LEAD INTO RIGHT ATRIUM, PERCUTANEOUS APPROACH: ICD-10-PCS | Performed by: INTERNAL MEDICINE

## 2023-10-18 PROCEDURE — 71045 X-RAY EXAM CHEST 1 VIEW: CPT

## 2023-10-18 PROCEDURE — 2060000000 HC ICU INTERMEDIATE R&B

## 2023-10-18 PROCEDURE — C1892 INTRO/SHEATH,FIXED,PEEL-AWAY: HCPCS | Performed by: INTERNAL MEDICINE

## 2023-10-18 PROCEDURE — C1898 LEAD, PMKR, OTHER THAN TRANS: HCPCS | Performed by: INTERNAL MEDICINE

## 2023-10-18 DEVICE — PACING LEAD
Type: IMPLANTABLE DEVICE | Status: FUNCTIONAL
Brand: TENDRIL™

## 2023-10-18 DEVICE — DR PACEMAKER DDDR
Type: IMPLANTABLE DEVICE | Status: FUNCTIONAL
Brand: ASSURITY MRI™

## 2023-10-18 DEVICE — PACING LEAD
Type: IMPLANTABLE DEVICE
Brand: TENDRIL™

## 2023-10-18 RX ORDER — SODIUM CHLORIDE 9 MG/ML
INJECTION, SOLUTION INTRAVENOUS PRN
Status: DISCONTINUED | OUTPATIENT
Start: 2023-10-18 | End: 2023-10-20 | Stop reason: HOSPADM

## 2023-10-18 RX ORDER — CASTOR OIL AND BALSAM, PERU 788; 87 MG/G; MG/G
OINTMENT TOPICAL 2 TIMES DAILY
Status: DISCONTINUED | OUTPATIENT
Start: 2023-10-19 | End: 2023-10-20 | Stop reason: HOSPADM

## 2023-10-18 RX ORDER — SODIUM CHLORIDE 0.9 % (FLUSH) 0.9 %
5-40 SYRINGE (ML) INJECTION PRN
Status: DISCONTINUED | OUTPATIENT
Start: 2023-10-18 | End: 2023-10-20 | Stop reason: HOSPADM

## 2023-10-18 RX ORDER — FENTANYL CITRATE 50 UG/ML
INJECTION, SOLUTION INTRAMUSCULAR; INTRAVENOUS PRN
Status: DISCONTINUED | OUTPATIENT
Start: 2023-10-18 | End: 2023-10-18 | Stop reason: HOSPADM

## 2023-10-18 RX ORDER — HEPARIN SODIUM 10000 [USP'U]/ML
INJECTION, SOLUTION INTRAVENOUS; SUBCUTANEOUS PRN
Status: DISCONTINUED | OUTPATIENT
Start: 2023-10-18 | End: 2023-10-18 | Stop reason: HOSPADM

## 2023-10-18 RX ORDER — LIDOCAINE HYDROCHLORIDE 10 MG/ML
INJECTION, SOLUTION EPIDURAL; INFILTRATION; INTRACAUDAL; PERINEURAL PRN
Status: DISCONTINUED | OUTPATIENT
Start: 2023-10-18 | End: 2023-10-18 | Stop reason: HOSPADM

## 2023-10-18 RX ORDER — MIDAZOLAM HYDROCHLORIDE 1 MG/ML
INJECTION INTRAMUSCULAR; INTRAVENOUS PRN
Status: DISCONTINUED | OUTPATIENT
Start: 2023-10-18 | End: 2023-10-18 | Stop reason: HOSPADM

## 2023-10-18 RX ORDER — SODIUM CHLORIDE 0.9 % (FLUSH) 0.9 %
5-40 SYRINGE (ML) INJECTION EVERY 12 HOURS SCHEDULED
Status: DISCONTINUED | OUTPATIENT
Start: 2023-10-18 | End: 2023-10-20 | Stop reason: HOSPADM

## 2023-10-18 RX ADMIN — TAMSULOSIN HYDROCHLORIDE 0.4 MG: 0.4 CAPSULE ORAL at 08:51

## 2023-10-18 RX ADMIN — SODIUM CHLORIDE, PRESERVATIVE FREE 10 ML: 5 INJECTION INTRAVENOUS at 20:55

## 2023-10-18 RX ADMIN — ACETAMINOPHEN 650 MG: 325 TABLET ORAL at 17:30

## 2023-10-18 RX ADMIN — LISINOPRIL 10 MG: 10 TABLET ORAL at 08:51

## 2023-10-18 RX ADMIN — ACETAMINOPHEN 325 MG: 325 TABLET ORAL at 23:12

## 2023-10-18 RX ADMIN — SODIUM CHLORIDE, PRESERVATIVE FREE 10 ML: 5 INJECTION INTRAVENOUS at 09:00

## 2023-10-18 ASSESSMENT — PAIN DESCRIPTION - ORIENTATION: ORIENTATION: LEFT

## 2023-10-18 ASSESSMENT — PAIN DESCRIPTION - LOCATION: LOCATION: INCISION

## 2023-10-18 ASSESSMENT — PAIN SCALES - GENERAL: PAINLEVEL_OUTOF10: 1

## 2023-10-18 ASSESSMENT — PAIN DESCRIPTION - DESCRIPTORS: DESCRIPTORS: SORE

## 2023-10-18 NOTE — PROGRESS NOTES
4 Eyes Skin Assessment     NAME:  Gracie Jacobsen  YOB: 1931  MEDICAL RECORD NUMBER:  [id number]    The patient is being assessed for  Admission    I agree that at least one RN has performed a thorough Head to Toe Skin Assessment on the patient. ALL assessment sites listed below have been assessed. Areas assessed by both nurses:    Head, Face, Ears, Shoulders, Back, Chest, Arms, Elbows, Hands, Sacrum. Buttock, Coccyx, Ischium, Legs. Feet and Heels, and Under Medical Devices         Does the Patient have a Wound?  No noted wound(s); blanchable redness to sacrum       Yomi Prevention initiated by RN: Yes  Wound Care Orders initiated by RN: No    Pressure Injury (Stage 3,4, Unstageable, DTI, NWPT, and Complex wounds) if present, place Wound referral order by RN under : No    New Ostomies, if present place, Ostomy referral order under : No     Nurse 1 eSignature: Electronically signed by Saulo David RN on 10/17/23 at 11:41 PM EDT    **SHARE this note so that the co-signing nurse can place an eSignature**    Nurse 2 eSignature: Electronically signed by Trever Randall RN on 10/18/23 at 6:50 AM EDT

## 2023-10-18 NOTE — PROGRESS NOTES
2220 Report received from [facility], ED RN. All questions/concerns addressed. 2240 Pt arrived to unit. HR in the 30's at rest, up to 60's with activity. Remains asymptomatic of bradycardia. Denies pain or SOB. Pt oriented to room, standard safety measures in place. 0114 HR sustaining primarily in the low 30's, dropping as low as 28 while as rest, in a 2nd degree type II. /64. Pt remains asymptomatic. Notified Balta Huber NP.    Elli Gustafson NP at bedside to round on pt. Will notify if bradycardia worsens or if pt becomes symptomatic.     0441 Pt asking about saw palmetto supplement he usually takes BID for BPH. Reached out to Balta Huber NP to see if this is something we are able to add to [facility]. Per NP, primary physician will need to authorize it's use and pt would need to provide his own supply. Pt has his own supple available - will relay to dayshift RN he would like it added to [facility]. End of Shift Note    Bedside shift change report given to Sherryle Halls, RN (oncoming nurse) by Qamar Sofia RN (offgoing nurse). Report included the following information SBAR, Kardex, Intake/Output, MAR, Recent Results, and Cardiac Rhythm 2nd  degree AVB Type II    Shift worked:  1900 - 0700     Shift summary and any significant changes:     Pt remains in 2nd degree type II AVB. HR drops as low as upper 20's while sleeping, sustaining primarily in the 30's while at rest. HR in the 50's - 60's with minimal activity, tolerating standing at bedside to void with staff present. Remains asymptomatic of bradycardia - easy to arouse when sleeping and no c/o dizziness. BP stable/slightly hypertensive. Placed on 2L NC while sleeping to maintain O2 sats >90%. Denies pain or SOB. Remains NPO for possible procedure today. Concerns for physician to address:  See above.      Zone phone for oncoming shift:          Activity:     Number times ambulated in hallways past shift: 0  Number of times OOB to chair past shift: 0    Cardiac:   Cardiac Monitoring: Yes           Access:  Current line(s): PIV     Genitourinary:   Urinary status: voiding    Respiratory:      Chronic home O2 use?: NO  Incentive spirometer at bedside: N/A       GI:     Current diet:  Diet NPO  Passing flatus: YES  Tolerating current diet: YES       Pain Management:   Patient states pain is manageable on current regimen: YES    Skin:     Interventions: float heels, increase time out of bed, and nutritional support    Patient Safety:  Fall Score:    Interventions: bed/chair alarm, gripper socks, pt to call before getting OOB, and stay with me (per policy)       Length of Stay:  Expected LOS: 2  Actual LOS: 1      Carla Mckeon RN

## 2023-10-18 NOTE — PLAN OF CARE
Problem: Pain  Goal: Verbalizes/displays adequate comfort level or baseline comfort level  Outcome: Progressing  Flowsheets (Taken 10/17/2023 2244)  Verbalizes/displays adequate comfort level or baseline comfort level:   Encourage patient to monitor pain and request assistance   Assess pain using appropriate pain scale   Administer analgesics based on type and severity of pain and evaluate response   Implement non-pharmacological measures as appropriate and evaluate response   Notify Licensed Independent Practitioner if interventions unsuccessful or patient reports new pain     Problem: Skin/Tissue Integrity  Goal: Absence of new skin breakdown  Description: 1. Monitor for areas of redness and/or skin breakdown  2. Assess vascular access sites hourly  3. Every 4-6 hours minimum:  Change oxygen saturation probe site  4. Every 4-6 hours:  If on nasal continuous positive airway pressure, respiratory therapy assess nares and determine need for appliance change or resting period.   Outcome: Progressing     Problem: Safety - Adult  Goal: Free from fall injury  Outcome: Progressing  Flowsheets (Taken 10/17/2023 2244)  Free From Fall Injury: Instruct family/caregiver on patient safety     Problem: ABCDS Injury Assessment  Goal: Absence of physical injury  Outcome: Progressing  Flowsheets (Taken 10/17/2023 2244)  Absence of Physical Injury: Implement safety measures based on patient assessment

## 2023-10-18 NOTE — PLAN OF CARE
Problem: Pain  Goal: Verbalizes/displays adequate comfort level or baseline comfort level  10/18/2023 1611 by Chely Ware RN  Outcome: Progressing  10/18/2023 0319 by Eric Murillo RN  Outcome: Progressing  Flowsheets (Taken 10/17/2023 2244)  Verbalizes/displays adequate comfort level or baseline comfort level:   Encourage patient to monitor pain and request assistance   Assess pain using appropriate pain scale   Administer analgesics based on type and severity of pain and evaluate response   Implement non-pharmacological measures as appropriate and evaluate response   Notify Licensed Independent Practitioner if interventions unsuccessful or patient reports new pain     Problem: Discharge Planning  Goal: Discharge to home or other facility with appropriate resources  Outcome: Progressing     Problem: Skin/Tissue Integrity  Goal: Absence of new skin breakdown  Description: 1. Monitor for areas of redness and/or skin breakdown  2. Assess vascular access sites hourly  3. Every 4-6 hours minimum:  Change oxygen saturation probe site  4. Every 4-6 hours:  If on nasal continuous positive airway pressure, respiratory therapy assess nares and determine need for appliance change or resting period.   10/18/2023 0319 by Eric Murillo RN  Outcome: Progressing     Problem: Safety - Adult  Goal: Free from fall injury  10/18/2023 1611 by Chely Ware RN  Outcome: Progressing  10/18/2023 0319 by Eric Murillo RN  Outcome: Progressing  Flowsheets (Taken 10/17/2023 2244)  Free From Fall Injury: Instruct family/caregiver on patient safety     Problem: ABCDS Injury Assessment  Goal: Absence of physical injury  10/18/2023 1611 by Chely Ware RN  Outcome: Progressing  10/18/2023 0319 by Eric Murillo RN  Outcome: Progressing  Flowsheets (Taken 10/17/2023 2244)  Absence of Physical Injury: Implement safety measures based on patient assessment     Problem: Chronic Conditions and Co-morbidities  Goal: Patient's chronic conditions and co-morbidity symptoms are monitored and maintained or improved  Outcome: Progressing

## 2023-10-18 NOTE — CONSULTS
GABY FLORES  [facility]  [address] [place] [address], [address] [place] [address]  [phone]  [employer]. [employer]     PLEASE NOTE THAT WE CONFIRMED WITH THE REFERRING PHYSICIAN PRIOR TO SEEING THE PATIENT THAT THE PATIENT IS BEING REFERRED FOR INITIAL HOSPITAL EVALUATION AND FOR LONG-TERM ONGOING CARDIAC CARE. Date of  Admission: 10/17/2023  6:26 PM     Admission type:Emergency    Dariela Frost is a 80 y.o. male admitted for Bradycardia [R00.1]. Pt presented with 4 days of headache. Found to be in 2:1 av block and htn.  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    DJD (degenerative joint disease) 07/09/2012    Coronary atherosclerosis of native coronary artery 07/09/2012    Aortic ectasia, 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[address]) 07/09/2012    Benign prostatic hyperplasia without lower urinary tract symptoms 07/09/2012    Right bundle branch block 07/09/2012    Gout, unspecified 07/09/2012    Chronic back pain 07/09/2012    Radiculopathy 10/11/2011      Myrtle Singleton MD  Past Medical History:   Diagnosis Date    Arthritis     back, legs    CAD (coronary artery disease)     no stents    Chest pain, unspecified     Congestive heart failure ([address])     Essential hypertension     Essential hypertension, benign     GERD (gastroesophageal reflux disease)     diet controlled    Hypertension     Lung cancer ([address])     lf lung 2014    Mixed hyperlipidemia     Pneumonia 01/2022    Recurrent kidney stones     Thromboembolus ([address]) 2014    Right leg negative  Gastrointestinal: negative  Genitourinary: negative  Musculoskeletal: negative  Neurological: headache  Behvioral/Psych: negative  Endocrine: negative     Subjective:      Vitals:    10/18/23 0900   BP: (!) 176/70   Pulse:    Resp:    Temp:    SpO2:        Physical Exam:  General:  Alert, cooperative, well noursished, well developed, appears stated age   Eyes:  Sclera anicteric. Pupils equally round and reactive to light. Mouth/Throat: Mucous membranes normal, oral pharynx clear   Neck: Supple   Lungs:   Clear to auscultation bilaterally, good effort   CV:  Joel, Regular rate and rhythm,no murmur, click, rub or gallop   Abdomen:   Soft, non-tender. bowel sounds normal. non-distended   Extremities: No cyanosis or edema   Skin: Skin color, texture, turgor normal. no acute rash or lesions   Lymph nodes: Cervical and supraclavicular normal   Musculoskeletal: No swelling or deformity       Psych: Alert and oriented, normal mood affect given the setting         Cardiographics    Telemetry: nsr with 2:1 av block, rbbb      Labs:  Recent Labs     10/17/23  1841 10/18/23  0308   WBC 8.1 8.3   HGB 15.8 15.5   HCT 48.3 48.3    190     Recent Labs     10/17/23  1841 10/18/23  0308    138   K 4.5 4.2    106   CO2 28 27   BUN 28* 23*   ALT 28  --        No results for input(s): \"CPK\", \"CKMB\" in the last 72 hours. Invalid input(s): \"TROIQ\"      Intake/Output Summary (Last 24 hours) at 10/18/2023 1027  Last data filed at 10/18/2023 0541  Gross per 24 hour   Intake --   Output 1675 ml   Net -1675 ml         Assessment:     Assessment:       Principal Problem:    Bradycardia  Active Problems:    Mobitz II    Right bundle branch block  Resolved Problems:    * No resolved hospital problems. *       Plan:     Gena Adams is a pleasant gentleman in 2:1 av block with rbbb. He was on minimal bb therapy (12.5 mg toprol).  He is a candidate for dc ppm. I discussed the risks/benefits/alternatives of the

## 2023-10-18 NOTE — CARE COORDINATION
Care Management Initial Assessment       RUR: 11% (Low RUR)  Readmission? No  1st IM letter given? Yes - 10/17  1st  letter given: No     10/18/23 0863   Service Assessment   Patient Orientation Alert and Oriented   Cognition Alert   History Provided By Patient   Primary Caregiver Self   Accompanied By/Relationship no one   Support Systems Children  (Marivel Reddy Child [phone])   Patient's Healthcare Decision Maker is: Named in [facility] Seg[facility] Pedersen   PCP Verified by CM Yes   Last Visit to PCP Within last 3 months   Prior Functional Level Independent in ADLs/IADLs   Current Functional Level Independent in ADLs/IADLs   Can patient return to prior living arrangement Yes   Ability to make needs known: Good   Family able to assist with home care needs: Yes   Would you like for me to discuss the discharge plan with any other family members/significant others, and if so, who? Yes   Financial Resources Medicare   Community Resources None   Social/Functional History   Lives With Alone   Type of Home House   Home Layout Two level   Home Access Level entry   Home Equipment Cane;Walker, rolling  (shower seat)   Active  Yes   Discharge Planning   Type of Residence House   Current Services Prior To Admission None   Patient expects to be discharged to: [facility]     CM met the patient at the bedside, introduced self, role of CM related to discharge planning and transition of care and verified demographics and insurance/payor information. The patient refused to discuss ACP. He is alert, able to make needs known, able to perform ADLS/IADLS, and able to drive. He has no history of home health or SNF in the past but has been to [employer]. The patient lives along in a house but has a son who is his support system, Krystle Grimaldo [phone] and a daughter in law. He has a cane, walker and shower seat. The patient's son will  upon discharge.      Tomas Rojas RN  Case Management  [phone]

## 2023-10-18 NOTE — ED NOTES
TRANSFER - OUT REPORT:    Verbal report given to [address] [place] [address] Road on [employer]  being transferred to PCU for routine progression of patient care       Report consisted of patient's Situation, Background, Assessment and   Recommendations(SBAR). Information from the following report(s) Nurse Handoff Report, ED SBAR, and Cardiac Rhythm Bradycardic  was reviewed with the receiving nurse. Lines:   Peripheral IV 10/17/23 Right Antecubital (Active)   Site Assessment Clean, dry & intact 10/17/23 1843   Line Status Blood return noted; Flushed;Specimen collected 10/17/23 [address] checked and tightened 10/17/23 1843   Phlebitis Assessment No symptoms 10/17/23 1843   Infiltration Assessment 0 10/17/23 1843   Alcohol Cap Used No 10/17/23 1843   Dressing Status New dressing applied;Clean, dry & intact 10/17/23 1843   Dressing Type Transparent 10/17/23 1843        Opportunity for questions and clarification was provided.       Patient transported with:  Monitor and Registered Nurse           Pennie Roque  10/17/23 8015

## 2023-10-18 NOTE — PROGRESS NOTES
Sp dc ppm    Cxr pending    Ok for dc this afternoon after 4 pm     F/u in 2 weeks as outpt    Thank you for allowing me to participate in this patients care.     Jhoan Barrios MD, [facility]oss[facility]

## 2023-10-18 NOTE — H&P
Hospitalist Admission Note    NAME:   Elvia Jackson   : 10/3/1931   MRN: [id number]     Date/Time: 10/17/2023 8:29 PM    Patient PCP: Reggie Long MD    ______________________________________________________________________  Given the patient's current clinical presentation, I have a high level of concern for decompensation if discharged from the emergency department. Complex decision making was performed, which includes reviewing the patient's available past medical records, laboratory results, and x-ray films. My assessment of this patient's clinical condition and my plan of care is as follows. Assessment / Plan:    Bradycardia    EKG shows second-degree block  Hold metoprolol  ED discussed with cardiology, recommended n.p.o. after midnight for possible pacemaker  Check TSH  Keep pacer pads on  Atropine as needed for symptomatic bradycardia  Also complains of headache, uncontrolled hypertension, will do CT head    Hypertension  Continue enalapril  Hydralazine as needed  Avoid AV richie agents    BPH  Continue Flomax    Medical Decision Making:   I personally reviewed labs: CBC, BMP  I personally reviewed imaging: Chest x-ray  I personally reviewed EKG:  Toxic drug monitoring: None  Discussed case with: ED provider. After discussion I am in agreement that acuity of patient's medical condition necessitates hospital stay. Code Status: DNR as per his wishes  DVT Prophylaxis: No AC given plan for pacemaker  GI Prophylaxis: None  Baseline: Ambulatory from home    Subjective:   CHIEF COMPLAINT: Shortness of breath    HISTORY OF PRESENT ILLNESS:     Melinda Woodard is a 80 y.o.  male with PMHx significant as below presented with shortness of breath for last 4 days and some chest tightness. His symptoms got worse today which prompted him to come to ED. He denies any dizziness, loss of consciousness, change in bladder bowel habits, lower extremity edema or tenderness, abdominal pain.   Complains of

## 2023-10-18 NOTE — PROGRESS NOTES
I was paged last night about this patient but when calling back was told the page was a mistake as patient has an established relationship with Dr. Kavon Wright. I will remove my name from the chart.        Laisha Marcus MD  Clinical Cardiac Electrophysiology  [facility]

## 2023-10-19 ENCOUNTER — APPOINTMENT (OUTPATIENT)
Facility: HOSPITAL | Age: 88
DRG: 243 | End: 2023-10-19
Payer: MEDICARE

## 2023-10-19 LAB
ECHO BSA: 2.27 M2
EKG ATRIAL RATE: 35 BPM
EKG DIAGNOSIS: NORMAL
EKG P AXIS: 49 DEGREES
EKG P-R INTERVAL: 206 MS
EKG Q-T INTERVAL: 552 MS
EKG QRS DURATION: 152 MS
EKG QTC CALCULATION (BAZETT): 421 MS
EKG R AXIS: -66 DEGREES
EKG T AXIS: -15 DEGREES
EKG VENTRICULAR RATE: 35 BPM

## 2023-10-19 PROCEDURE — 2060000000 HC ICU INTERMEDIATE R&B

## 2023-10-19 PROCEDURE — 02WA3MZ REVISION OF CARDIAC LEAD IN HEART, PERCUTANEOUS APPROACH: ICD-10-PCS | Performed by: INTERNAL MEDICINE

## 2023-10-19 PROCEDURE — C1889 IMPLANT/INSERT DEVICE, NOC: HCPCS | Performed by: INTERNAL MEDICINE

## 2023-10-19 PROCEDURE — 2580000003 HC RX 258: Performed by: INTERNAL MEDICINE

## 2023-10-19 PROCEDURE — 33208 INSRT HEART PM ATRIAL & VENT: CPT | Performed by: INTERNAL MEDICINE

## 2023-10-19 PROCEDURE — 71045 X-RAY EXAM CHEST 1 VIEW: CPT

## 2023-10-19 PROCEDURE — 33215 REPOSITION PACING-DEFIB LEAD: CPT | Performed by: INTERNAL MEDICINE

## 2023-10-19 PROCEDURE — 97116 GAIT TRAINING THERAPY: CPT

## 2023-10-19 PROCEDURE — A4217 STERILE WATER/SALINE, 500 ML: HCPCS | Performed by: INTERNAL MEDICINE

## 2023-10-19 PROCEDURE — 97162 PT EVAL MOD COMPLEX 30 MIN: CPT

## 2023-10-19 PROCEDURE — 2500000003 HC RX 250 WO HCPCS: Performed by: INTERNAL MEDICINE

## 2023-10-19 PROCEDURE — 2700000000 HC OXYGEN THERAPY PER DAY

## 2023-10-19 PROCEDURE — 6370000000 HC RX 637 (ALT 250 FOR IP): Performed by: STUDENT IN AN ORGANIZED HEALTH CARE EDUCATION/TRAINING PROGRAM

## 2023-10-19 PROCEDURE — 99152 MOD SED SAME PHYS/QHP 5/>YRS: CPT | Performed by: INTERNAL MEDICINE

## 2023-10-19 PROCEDURE — 99153 MOD SED SAME PHYS/QHP EA: CPT | Performed by: INTERNAL MEDICINE

## 2023-10-19 PROCEDURE — 2709999900 HC NON-CHARGEABLE SUPPLY: Performed by: INTERNAL MEDICINE

## 2023-10-19 PROCEDURE — 6360000002 HC RX W HCPCS: Performed by: INTERNAL MEDICINE

## 2023-10-19 PROCEDURE — 2580000003 HC RX 258: Performed by: STUDENT IN AN ORGANIZED HEALTH CARE EDUCATION/TRAINING PROGRAM

## 2023-10-19 RX ORDER — FENTANYL CITRATE 50 UG/ML
INJECTION, SOLUTION INTRAMUSCULAR; INTRAVENOUS PRN
Status: DISCONTINUED | OUTPATIENT
Start: 2023-10-19 | End: 2023-10-19 | Stop reason: HOSPADM

## 2023-10-19 RX ORDER — SODIUM CHLORIDE 0.9 % (FLUSH) 0.9 %
5-40 SYRINGE (ML) INJECTION EVERY 12 HOURS SCHEDULED
Status: DISCONTINUED | OUTPATIENT
Start: 2023-10-19 | End: 2023-10-20 | Stop reason: HOSPADM

## 2023-10-19 RX ORDER — MIDAZOLAM HYDROCHLORIDE 1 MG/ML
INJECTION INTRAMUSCULAR; INTRAVENOUS PRN
Status: DISCONTINUED | OUTPATIENT
Start: 2023-10-19 | End: 2023-10-19 | Stop reason: HOSPADM

## 2023-10-19 RX ORDER — SODIUM CHLORIDE 0.9 % (FLUSH) 0.9 %
5-40 SYRINGE (ML) INJECTION PRN
Status: DISCONTINUED | OUTPATIENT
Start: 2023-10-19 | End: 2023-10-20 | Stop reason: HOSPADM

## 2023-10-19 RX ORDER — HEPARIN SODIUM 10000 [USP'U]/ML
INJECTION, SOLUTION INTRAVENOUS; SUBCUTANEOUS PRN
Status: DISCONTINUED | OUTPATIENT
Start: 2023-10-19 | End: 2023-10-19 | Stop reason: HOSPADM

## 2023-10-19 RX ORDER — LIDOCAINE HYDROCHLORIDE 10 MG/ML
INJECTION, SOLUTION EPIDURAL; INFILTRATION; INTRACAUDAL; PERINEURAL PRN
Status: DISCONTINUED | OUTPATIENT
Start: 2023-10-19 | End: 2023-10-19 | Stop reason: HOSPADM

## 2023-10-19 RX ORDER — SODIUM CHLORIDE 9 MG/ML
INJECTION, SOLUTION INTRAVENOUS PRN
Status: DISCONTINUED | OUTPATIENT
Start: 2023-10-19 | End: 2023-10-20 | Stop reason: HOSPADM

## 2023-10-19 RX ADMIN — SODIUM CHLORIDE, PRESERVATIVE FREE 20 ML: 5 INJECTION INTRAVENOUS at 09:10

## 2023-10-19 RX ADMIN — ACETAMINOPHEN 325 MG: 325 TABLET ORAL at 21:23

## 2023-10-19 RX ADMIN — SODIUM CHLORIDE, PRESERVATIVE FREE 10 ML: 5 INJECTION INTRAVENOUS at 09:22

## 2023-10-19 RX ADMIN — LISINOPRIL 10 MG: 10 TABLET ORAL at 09:11

## 2023-10-19 RX ADMIN — SODIUM CHLORIDE, PRESERVATIVE FREE 10 ML: 5 INJECTION INTRAVENOUS at 21:31

## 2023-10-19 RX ADMIN — ACETAMINOPHEN 650 MG: 325 TABLET ORAL at 14:58

## 2023-10-19 RX ADMIN — CASTOR OIL AND BALSAM, PERU: 788; 87 OINTMENT TOPICAL at 10:07

## 2023-10-19 RX ADMIN — TAMSULOSIN HYDROCHLORIDE 0.4 MG: 0.4 CAPSULE ORAL at 09:11

## 2023-10-19 RX ADMIN — CASTOR OIL AND BALSAM, PERU: 788; 87 OINTMENT TOPICAL at 21:32

## 2023-10-19 ASSESSMENT — PAIN SCALES - GENERAL
PAINLEVEL_OUTOF10: 2
PAINLEVEL_OUTOF10: 0
PAINLEVEL_OUTOF10: 1
PAINLEVEL_OUTOF10: 3

## 2023-10-19 ASSESSMENT — PAIN DESCRIPTION - LOCATION
LOCATION: CHEST
LOCATION: CHEST

## 2023-10-19 ASSESSMENT — PAIN DESCRIPTION - DESCRIPTORS
DESCRIPTORS: ACHING;TENDER
DESCRIPTORS: ACHING;TENDER

## 2023-10-19 ASSESSMENT — PAIN DESCRIPTION - ORIENTATION
ORIENTATION: LEFT
ORIENTATION: LEFT

## 2023-10-19 NOTE — PLAN OF CARE
Problem: Pain  Goal: Verbalizes/displays adequate comfort level or baseline comfort level  10/19/2023 1009 by Amber Underwood RN  Outcome: Progressing  10/19/2023 0240 by Tesfaye Brambila RN  Outcome: Progressing     Problem: Discharge Planning  Goal: Discharge to home or other facility with appropriate resources  10/19/2023 1009 by Amber Underwood RN  Outcome: Progressing  10/19/2023 0240 by Tesfaye Brambila RN  Outcome: Progressing     Problem: Skin/Tissue Integrity  Goal: Absence of new skin breakdown  Description: 1. Monitor for areas of redness and/or skin breakdown  2. Assess vascular access sites hourly  3. Every 4-6 hours minimum:  Change oxygen saturation probe site  4. Every 4-6 hours:  If on nasal continuous positive airway pressure, respiratory therapy assess nares and determine need for appliance change or resting period.   10/19/2023 1009 by Amber Underwood RN  Outcome: Progressing  10/19/2023 0240 by Tesfaye Brambila RN  Outcome: Progressing     Problem: Safety - Adult  Goal: Free from fall injury  10/19/2023 1009 by Amber Underwood RN  Outcome: Progressing  10/19/2023 0240 by Tesfaye Brambila RN  Outcome: Progressing     Problem: ABCDS Injury Assessment  Goal: Absence of physical injury  10/19/2023 1009 by Amber Underwood RN  Outcome: Progressing  10/19/2023 0240 by Tesfaye Barmbila RN  Outcome: Progressing     Problem: Chronic Conditions and Co-morbidities  Goal: Patient's chronic conditions and co-morbidity symptoms are monitored and maintained or improved  10/19/2023 1009 by Amber Underwood RN  Outcome: Progressing  10/19/2023 0240 by Tesfaye Brambila RN  Outcome: Progressing tinnitus  RESP:  Denies any cough, dyspnea, or wheezing. CV:  Denies any chest pain with exertion or at rest, palpitations, syncope, or edema. GI:  Denies any dysphagia, nausea, vomiting, abdominal pain, heartburn, changes in bowel habit, melena or rectal bleeding  MUSCULOSKELETAL:  Denies any joint swelling, joint pain, or loss of range of motion. NEURO:  Denies any headaches, tremors, dizziness, vertigo, memory loss, confusion, weakness, numbness or tingling. PSYCH:  Denies any sleeping problems, history of abuse, marital discord. HEME/LYMPHATIC/IMMUNO:  Denies , bruising, bleeding abnormalities   ENDO:  Denies any heat or cold intolerance, panemiaolyuria or polydipsia. Objective:   No intake or output data in the 24 hours ending 09/09/19 1141   Vitals:   Vitals:    09/09/19 1119   BP: (!) 134/90   Pulse: 77   Resp: 18   Temp: 97.6 °F (36.4 °C)   SpO2: 96%     Physical Exam:   Gen:  awake, alert, cooperative, no apparent distress  EYES:Lids and lashes normal, pupils equal, round ,extra ocular muscles intact, sclera clear, conjunctiva normal  ENT:  Normocephalic, oral pharynx with moist mucus membranes  NECK:  Supple, symmetrical, trachea midline, no adenopathy,  LUNGS:  Diminshed bilaterally, no rales ronchi or wheezing noted. CARDIOVASCULAR:  regular rate and rhythm, normal S1 and S2,no murmur noted, peripheral pulses 2+, non pitting edema  ABDOMEN: Normal BS, Non tender, non distended, no HSM noted. MUSCULOSKELETAL:  ROM of all extremities grossly wnl  NEUROLOGIC: AOx 3,  Cranial nerves II-XII are grossly intact. Motor is 5 out of 5 bilaterally. Sensory is intact, no lateralizing findings.    SKIN:  no bruising or bleeding, normal skin color, turgor, no redness, warmth, or swelling      Past Medical History:      Past Medical History:   Diagnosis Date    Abnormal EKG     CAD (coronary artery disease)     Cardiomyopathy (HCC)     EF 27%    [facility]congestive heart failure) ([facility])     Grade II per week: Not on file     Minutes per session: Not on file    Stress: Not on file   Relationships    Social connections:     Talks on phone: Not on file     Gets together: Not on file     Attends Jewish service: Not on file     Active member of club or organization: Not on file     Attends meetings of clubs or organizations: Not on file     Relationship status: Not on file    Intimate partner violence:     Fear of current or ex partner: Not on file     Emotionally abused: Not on file     Physically abused: Not on file     Forced sexual activity: Not on file   Other Topics Concern    Not on file   Social History Narrative    August 2019: seeing PCP Dr. Patel sauceda [place]       Electronically signed by CHRISTA Loo CNP on 9/9/2019 at 11:41 AM

## 2023-10-19 NOTE — PROGRESS NOTES
RV lead must have had a microdislodgement bc it had not moved from yesterday    S/p lead revision    Cxr pending. [facility] for dc in the evening    Thank you for allowing me to participate in this patients care.     Tmaar Anderson MD, [facility]

## 2023-10-19 NOTE — PROGRESS NOTES
1900 Bedside and Verbal shift change report given to Pennie Freeman (oncoming nurse) by ADDIS Pham (offgoing nurse). Report included the following information Nurse Handoff Report, Index, Intake/Output, MAR, Recent Results, and Cardiac Rhythm AV Paced . Jimmie Marcio will be charting on this pt.     2218 Noted HR in the 30's after pt standed to void. /67. Remains asymptomatic of bradycardia. PM appears to be failing to capture. [facility], NP notified of above by [facility], [address] [facility] Pt c/o soreness at incision site. Requesting to only have 325 mg of PRN Tylenol dose. KAMERON Sarabia aware. 8667 Cardiology paged by Katie Thomas RN. Notified Josie Ocampo MD of change in rhythm and drop in HR. Pt remains asymptomatic. BP most recently 139/92. Order received for EKG and cardiology will follow up in AM.    2352 EKG obtained. I have reviewed and agree with charting by Jose Tavarez RN. End of Shift Note    Bedside shift change report given to ADDIS Pham (oncoming nurse) by Salome Tucker RN (offgoing nurse). Report included the following information SBAR, Kardex, Intake/Output, MAR, Recent Results, and Cardiac Rhythm Pacer not capturing    Shift worked:  1900 - 0700     Shift summary and any significant changes:     See above. Pacer continues to fail to capture appropriately. HR sustaining primarily in 30's while at rest. Pt remains asymptomatic. BP remains stable. Hospitalist and cardiology made aware - cardiology coming to reevaluate pt this AM. Placed on 2L NC to maintain O2 sats >90% while sleeping. Concerns for physician to address:  See above.       Zone phone for oncoming shift:          Activity:     Number times ambulated in hallways past shift: 0  Number of times OOB to chair past shift: 0    Cardiac:   Cardiac Monitoring: Yes           Access:  Current line(s): PIV     Genitourinary:   Urinary status: voiding    Respiratory:      Chronic home O2 use?: NO  Incentive spirometer at

## 2023-10-19 NOTE — PROGRESS NOTES
Pacer was not captEnd of Shift Note    Bedside shift change report given to  Freddy Lancaster RN (oncoming nurse) by Shalonda Guerrero RN (offgoing nurse). Report included the following information Nurse Handoff Report, Intake/Output, Recent Results, Cardiac Rhythm  , and Alarm Parameters      Shift worked:  7PM-7AM   Shift summary and any significant changes:     V- Paced s/p  pace maker placement. until 22:18 hours when pacer was not capturing with HR in the 30's however, patient remain asymptomatic. NP contacted to review patient regarding pace not capturing. Cardiac team was also contacted via phone, Nurse spoke with Dr. Marifer Snow at 23.30 hours, regarding pacer. EKG was done as ordered by Dr. Marifer Snow. Monitored patient within shift. Dr. Marifer Snow informed nurse cardiac team will see patient in the morning. Surgical site remain dry and intact , open to air. IV line patent and flushing well, patient offered tylenol on request for pain, made comfortable, sling on left arm. Call bell and phone within reach of patient. Able to make needs known. Concerns for physician to address: Pacer not capturing.    Zone phone for oncoming shift:        Patient Information  Ventura Fajardo  80 y.o.  10/17/2023  6:26 PM by Clint Rose MD. Ventura Fajardo was admitted from Home    Problem List  Patient Active Problem List    Diagnosis Date Noted    Mobitz II 10/18/2023    Bradycardia 10/17/2023    Statin intolerance 01/24/2022    Abnormal stress test 01/24/2022    UTI (urinary tract infection) 11/16/2017    Sepsis ([address]) 11/16/2017    DVT of lower extremity (deep venous thrombosis) ([address]) 04/09/2014    Preoperative clearance 08/19/2013    Lung cancer ([address]) 08/14/2013    Recurrent kidney stones     Morbid obesity ([address]) 07/19/2012    Chronic ischemic heart disease, unspecified 07/09/2012    DJD (degenerative joint disease) 07/09/2012    Coronary atherosclerosis of native coronary artery 07/09/2012    Aortic ectasia, 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[address])

## 2023-10-19 NOTE — PROGRESS NOTES
Called by nurse Ms. Jie Velasco for bradycardia. Patient's heart rate in the 30s status post pacemaker today for heart block. Blood pressure stable and no symptoms reported attributable to the slow heart rate. Saved rhythm strips past 11 pm and 12 lead electrocardiogram confirmed high grade heart block, ventricular pacing at ~ 30 bpm. Suspect failure to sense in the atrium causing atrial pacing during refractory period (far field oversensing in the ventricle causing failure to pace). This bradycardia appears to be replaced dual AV pacing in subsequent telemetry strips. Continue to monitor closely for any hypotension or lightheadedness overnight. Will reconsult cardiac electrophysiology. Thanks, we will follow.

## 2023-10-19 NOTE — PROGRESS NOTES
Nurse contacted Cardiology team at 23:30 regarding HR changes s/p pace maker placement. Spoke with Dr. Candy Guillaume and nurse informed about patient having HR rate of 35. Received an order for 12 lead EKG, to monitor patient and nurse was informed cardiology team will come see patient in the morning. EKG ordered and initiated.

## 2023-10-19 NOTE — PLAN OF CARE
Problem: Pain  Goal: Verbalizes/displays adequate comfort level or baseline comfort level  10/19/2023 0240 by Yuval Read RN  Outcome: Progressing  10/18/2023 1611 by Ky Roman RN  Outcome: Progressing     Problem: Discharge Planning  Goal: Discharge to home or other facility with appropriate resources  10/19/2023 0240 by Yuval Read RN  Outcome: Progressing  10/18/2023 1611 by Ky Roman RN  Outcome: Progressing     Problem: Skin/Tissue Integrity  Goal: Absence of new skin breakdown  Description: 1. Monitor for areas of redness and/or skin breakdown  2. Assess vascular access sites hourly  3. Every 4-6 hours minimum:  Change oxygen saturation probe site  4. Every 4-6 hours:  If on nasal continuous positive airway pressure, respiratory therapy assess nares and determine need for appliance change or resting period.   Outcome: Progressing     Problem: Safety - Adult  Goal: Free from fall injury  10/19/2023 0240 by Yuval Read RN  Outcome: Progressing  10/18/2023 1611 by Ky Roman RN  Outcome: Progressing     Problem: ABCDS Injury Assessment  Goal: Absence of physical injury  10/19/2023 0240 by Yuval Read RN  Outcome: Progressing  10/18/2023 1611 by Ky Roman RN  Outcome: Progressing     Problem: Chronic Conditions and Co-morbidities  Goal: Patient's chronic conditions and co-morbidity symptoms are monitored and maintained or improved  10/19/2023 0240 by Yuval Read RN  Outcome: Progressing  10/18/2023 1611 by Ky Roman RN  Outcome: Progressing

## 2023-10-20 VITALS
SYSTOLIC BLOOD PRESSURE: 142 MMHG | HEART RATE: 75 BPM | TEMPERATURE: 98 F | RESPIRATION RATE: 23 BRPM | HEIGHT: 67 IN | OXYGEN SATURATION: 91 % | BODY MASS INDEX: 37.67 KG/M2 | WEIGHT: 240 LBS | DIASTOLIC BLOOD PRESSURE: 68 MMHG

## 2023-10-20 LAB
EKG ATRIAL RATE: 51 BPM
EKG DIAGNOSIS: NORMAL
EKG P AXIS: 97 DEGREES
EKG P-R INTERVAL: 160 MS
EKG Q-T INTERVAL: 592 MS
EKG QRS DURATION: 200 MS
EKG QTC CALCULATION (BAZETT): 545 MS
EKG R AXIS: -81 DEGREES
EKG T AXIS: 89 DEGREES
EKG VENTRICULAR RATE: 51 BPM

## 2023-10-20 PROCEDURE — 97116 GAIT TRAINING THERAPY: CPT

## 2023-10-20 PROCEDURE — 97535 SELF CARE MNGMENT TRAINING: CPT

## 2023-10-20 PROCEDURE — 6370000000 HC RX 637 (ALT 250 FOR IP): Performed by: STUDENT IN AN ORGANIZED HEALTH CARE EDUCATION/TRAINING PROGRAM

## 2023-10-20 PROCEDURE — 2700000000 HC OXYGEN THERAPY PER DAY

## 2023-10-20 PROCEDURE — 97166 OT EVAL MOD COMPLEX 45 MIN: CPT

## 2023-10-20 RX ADMIN — ACETAMINOPHEN 650 MG: 325 TABLET ORAL at 06:41

## 2023-10-20 RX ADMIN — LISINOPRIL 10 MG: 10 TABLET ORAL at 09:51

## 2023-10-20 RX ADMIN — TAMSULOSIN HYDROCHLORIDE 0.4 MG: 0.4 CAPSULE ORAL at 09:51

## 2023-10-20 ASSESSMENT — PAIN DESCRIPTION - ORIENTATION: ORIENTATION: LEFT;POSTERIOR

## 2023-10-20 ASSESSMENT — PAIN SCALES - GENERAL
PAINLEVEL_OUTOF10: 6
PAINLEVEL_OUTOF10: 2

## 2023-10-20 ASSESSMENT — PAIN DESCRIPTION - LOCATION: LOCATION: NECK;CHEST

## 2023-10-20 ASSESSMENT — PAIN DESCRIPTION - DESCRIPTORS: DESCRIPTORS: ACHING;CRAMPING

## 2023-10-20 NOTE — DISCHARGE SUMMARY
OF PRESENT ILLNESS:     Vida Moran is a 80 y.o.  male with PMHx significant as below presented with shortness of breath for last 4 days and some chest tightness. His symptoms got worse today which prompted him to come to ED. He denies any dizziness, loss of consciousness, change in bladder bowel habits, lower extremity edema or tenderness, abdominal pain. Complains of having headache earlier, which has resolved. We were asked to admit for work up and evaluation of the above problems. Past Medical History[]Expand by Default        Past Medical History:   Diagnosis Date    Arthritis       back, legs    CAD (coronary artery disease)       no stents    Chest pain, unspecified      Congestive heart failure ([address])      Essential hypertension      Essential hypertension, benign      GERD (gastroesophageal reflux disease)       diet controlled    Hypertension      Lung cancer ([address])       lf lung 2014    Mixed hyperlipidemia      Pneumonia 01/2022    Recurrent kidney stones      Thromboembolus ([address]) 2014     Right leg        pCXR read by radiology FINDINGS: Cardiomegaly is unchanged. The pulmonary vasculature is within normal limits. The lungs and pleural spaces are clear. The visualized bones and upper abdomen  are age-appropriate   IMPRESSION:  No acute process on portable chest.    Head CT read by radiology FINDINGS:  The ventricles and sulci are normal in size, shape and configuration. There is  no significant white matter disease. There is no intracranial hemorrhage,  extra-axial collection, or mass effect. The basilar cisterns are open. No CT  evidence of acute infarct. The bone windows demonstrate no abnormalities. The visualized portions of the  paranasal sinuses and mastoid air cells are clear. IMPRESSION:  No acute intracranial process. Hospital course:       Mobitz type 2 second degree heart block s/p PPM placement 10/18/2023 POA  Pacemaker lead dislodgment requiring RV lead revision

## 2023-10-20 NOTE — PLAN OF CARE
Problem: Physical Therapy - Adult  Goal: By Discharge: Performs mobility at highest level of function for planned discharge setting. See evaluation for individualized goals. Description: FUNCTIONAL STATUS PRIOR TO ADMISSION: Patient was independent and active without use of DME.    HOME SUPPORT PRIOR TO ADMISSION: The patient lives alone, 2-story house but 1st floor set-up, no steps to enter from outside. Daughter and son-in-law check on patient regularly. Physical Therapy Goals  Initiated 10/19/2023  1. Patient will move from supine to sit and sit to supine in bed with supervision/set-up within 7 day(s). 2.  Patient will perform sit to stand with supervision/set-up within 7 day(s). 3.  Patient will transfer from bed to chair and chair to bed with supervision/set-up using the least restrictive device within 7 day(s). 4.  Patient will ambulate with supervision/set-up for 150 feet with the least restrictive device within 7 day(s). Outcome: Progressing    PHYSICAL THERAPY TREATMENT    Patient: Solange Summers (19 y.o. male)  Date: 10/20/2023  Diagnosis: Bradycardia [R00.1] Bradycardia  Procedure(s) (LRB):  Lead reposition (N/A)  Insert PPM dual (N/A) 1 Day Post-Op  Precautions: Fall Risk           Sternal Precautions: No Pushing, No Pulling, 5# Lifting Restrictions        ASSESSMENT:  Patient continues to benefit from skilled PT services and is progressing towards goals. Pt was received sitting up in the chair and cleared by nursing to mobilize. Attempted to ambulate without oxygen and saturation dropped to 84%. Ambulated around the room and needed cues to maintain precautions. He would benefit from use of a [facility] for longer distance and endurance. PLAN:  Patient continues to benefit from skilled intervention to address the above impairments. Continue treatment per established plan of care. Recommendation for discharge: (in order for the patient to meet his/her long term goals):  Therapy 2

## 2023-10-20 NOTE — PROGRESS NOTES
Hospitalist Discharge Note    NAME:   Jameel Shankar   : 10/3/1931   MRN: [id number]     Admit date: 10/17/2023    Discharge date: 10/20/23    PCP: Mackenzie Rivera MD    Discharge Diagnoses:    Discharge Medications:  Current Discharge Medication List        CONTINUE these medications which have NOT CHANGED    Details   acetaminophen (TYLENOL) 500 MG tablet Take by mouth every 6 hours as needed      enalapril (VASOTEC) 10 MG tablet Take by mouth daily      metoprolol succinate (TOPROL XL) 50 MG extended release tablet Take 12.5 mg by mouth daily      tamsulosin (FLOMAX) 0.4 MG capsule Take by mouth daily           STOP taking these medications       acetaminophen-codeine (TYLENOL #3) 300-30 MG per tablet Comments:   Reason for Stopping:         nitroGLYCERIN (NITROSTAT) 0.4 MG SL tablet Comments:   Reason for Stopping:                Follow-up Information       Follow up With Specialties Details [facility], MD [facility], General Practice Schedule an appointment as soon as possible for a visit  [address]  [phone]      Toby Burton MD Electrophysiology, Cardiology, Cardiothoracic Surgery, Internal Medicine Schedule an appointment as soon as possible for a visit in 2 week(s)  [address] [address] [phone]              Time spent on discharge:   I spent 38 minutes on discharge, seeing and examining the patient, reconciling home meds and new meds, coordinating care with case management, doing the discharge papers and the D/C summary    Discharge disposition:     Discharge Condition: Stable    Summary of admission H+P(copied from Yuliya Pacheco MD Note):       Hospital course:      Bradycardia  Pacemaker lead dislodgment     EKG shows second-degree block  Hold metoprolol  S/p pacemaker today  TSH normal  Also had headache on admission, CT head no acute pathology  Pt/ot evaluation  Currently 92-93%

## 2023-10-20 NOTE — DISCHARGE INSTRUCTIONS
Resume home medications    Use tylenol for pain     Use the oxygen 2 liters while walking and when you sleep, can leave it off when resting    Not lifting > 10 lbs with the left arm until you see Dr Berto Sousa, contact his office if you have any questions regarding wound care    Christopher De Leon MD  [address] [place] [address]  [phone]  Schedule an appointment as soon as possible for a visit      Geovanny Murray MD  [address] [address] [phone]    Schedule an appointment as soon as possible for a visit in 2 week(s)

## 2023-10-20 NOTE — PROGRESS NOTES
Pulse oximetry assessment   94% at rest on room air (if 88% or less, skip next steps)  85% while ambulating on room air  97% at rest on 2LPM  91% while ambulating on 2LPM       Per night shift report, patient required 2L O2 throughout the night r/t oxygen saturation dropping into mid 80's. Patient also noted to have periods of apnea during the night. Patient also apneic while napping this morning. Periods of apnea lasting lasting up to 24 seconds. Spoke with patient and his son about what was observed and results of O2 challenge.

## 2023-10-20 NOTE — PROGRESS NOTES
Pulse Oximetry Assessment    94% at rest on room air  84% while ambulating on room air      Recovered in sitting within a few seconds. Oxygen dropped with walking and dressing. Good pleth.

## 2023-10-20 NOTE — CARE COORDINATION
10/20/23 1355   [address] Discharge   Transition of Care Consult (CM Consult) Home Health   Internal Home Health No   Services At/After Discharge Home Health   Mode of Transport at Discharge Other (see comment)   Confirm Follow Up Transport Family   Condition of Participation: Discharge Planning   The Plan for Transition of Care is related to the following treatment goals: PCP and Specialist appt follow up. Home Health, has home 02   [place] of Choice list was provided with basic dialogue that supports the patient's individualized plan of care/goals, treatment preferences, and shares the quality data associated with the providers? Yes     Patient ready for DC. Have discussed his new 02 and gave him information on [employer] contact to get the concentrator delivered. He has Home health with [address].      [facility] CM   1998

## 2023-10-20 NOTE — PLAN OF CARE
Problem: Occupational Therapy - Adult  Goal: By Discharge: Performs self-care activities at highest level of function for planned discharge setting. See evaluation for individualized goals. Description: FUNCTIONAL STATUS PRIOR TO ADMISSION:  Patient was independent in ADLs and functional mobility PTA.    , ADL Assistance: Independent,  ,  ,  ,  ,  ,  , Ambulation Assistance: Independent, Transfer Assistance: Independent, Active : Yes     HOME SUPPORT: Patient lived alone but has very supportive family who check on him and are planning to stay at discharge. Occupational Therapy Goals:  Initiated 10/20/2023  1. Patient will perform grooming standing at sink with Supervision within 7 day(s). 2.  Patient will perform upper body dressing while maintaining PPM precautions with Supervision within 7 day(s). 3.  Patient will perform lower body dressing while maintaining PPM precautions with Supervision within 7 day(s). 4.  Patient will perform toilet transfers with Supervision  within 7 day(s). 5.  Patient will perform all aspects of toileting with Supervision within 7 day(s). 6.  Patient will participate in upper extremity therapeutic exercise/activities while maintaining PPM precautions with Supervision for 3 minutes within 7 day(s). Outcome: Progressing   OCCUPATIONAL THERAPY EVALUATION    Patient: Bing Todd (6 y.o. male)  Date: 10/20/2023  Primary Diagnosis: Bradycardia [R00.1]  Procedure(s) (LRB):  Lead reposition (N/A)  Insert PPM dual (N/A) 1 Day Post-Op     Precautions: Fall Risk           Sternal Precautions: No Pushing, No Pulling, 5# Lifting Restrictions      ASSESSMENT :  The patient is limited by decreased functional mobility, independence in ADLs, high-level IADLs, ROM, strength, body mechanics, activity tolerance, endurance, safety awareness, coordination, balance, posture.     Based on the impairments listed above patient is functioning below his baseline for ADLs and functional

## 2023-10-20 NOTE — PROGRESS NOTES
2127 Pt given half dose of tylenol 325mg, pt stated he does not like to take too many medications. Pt's pain is at the left chest site where pace maker was placed. 0150 Pt up to the bathroom, voided and returned to bed.     0610 Pt called out and asked for tylenol. 4058 RN pulled partial dose 325mg from [facility]. 0275 RN went to give partial dose and pt requested full dose 650mg.     0300 RN returned partial dose 325mg to [employer], and pulled full dose 650mg.     0641 Pt up to recliner.

## 2023-11-01 ENCOUNTER — HOSPITAL ENCOUNTER (OUTPATIENT)
Facility: HOSPITAL | Age: 88
Discharge: HOME | End: 2023-11-03
Payer: MEDICARE

## 2023-11-01 DIAGNOSIS — M79.89 SWELLING OF LEFT UPPER EXTREMITY: ICD-10-CM

## 2023-11-01 PROCEDURE — 93971 EXTREMITY STUDY: CPT

## 2024-05-31 NOTE — PROGRESS NOTES
Oncology Interdisciplinary rounds were held today to discuss patient plan of care and outcomes. The following members were present: Nursing and Case Management.     ALOS: 1  DRG GLOS: 3.7    Plan of Care Discharge Disposition   León removed  IV fluids  IV antibiotics  CXR 11/18- Home [Feeling Poorly] : feeling poorly [Feeling Tired] : feeling tired [Arthralgias] : arthralgias [Joint Pain] : joint pain [Joint Swelling] : joint swelling [As Noted in HPI] : as noted in HPI [Skin Lesions] : skin lesion [Negative] : Heme/Lymph [Fever] : no fever [Chills] : no chills [Recent Weight Gain (___ Lbs)] : no recent weight gain [Recent Weight Loss (___ Lbs)] : no recent weight loss [Joint Stiffness] : no joint stiffness [Limb Pain] : no limb pain [Limb Swelling] : no limb swelling

## 2025-02-27 RX ORDER — M-VIT,TX,IRON,MINS/CALC/FOLIC 27MG-0.4MG
1 TABLET ORAL DAILY
COMMUNITY

## 2025-02-27 NOTE — PERIOP NOTE
[facility]  Preoperative Instructions        Surgery Date 3/7/25  Time of Arrival:  To Be Called  Contact # [phone] (Charanjit, son)    On the day of your surgery, please report to Surgical Services Registration Desk and sign in at your designated time.  The Surgery Center is located to the right of the Emergency Room.     2. You must have someone with you to drive you home. You should not drive a car for 24 hours following surgery. Please make arrangements for a friend or family member to stay with you for the first 24 hours after your surgery.    3. Do not have anything to eat or drink (including water, gum, mints, coffee, juice) after midnight ?3/6/25?      .?This may not apply to medications prescribed by your physician. ?(Please note below the special instructions with medications to take the morning of your procedure.)    4. We recommend you do not drink any alcoholic beverages for 24 hours before and after your surgery.    5. Contact your surgeon's office for instructions on the following medications: non-steroidal anti-inflammatory drugs (i.e. Advil, Aleve), vitamins, and supplements. (Some surgeon's will want you to stop these medications prior to surgery and others may allow you to take them)  **If you are currently taking Plavix, Coumadin, Aspirin and/or other blood-thinning agents, contact your surgeon for instructions.** Your surgeon will partner with the physician prescribing these medications to determine if it is safe to stop or if you need to continue taking.  Please do not stop taking these medications without instructions from your surgeon    6. Wear comfortable clothes.  Wear glasses instead of contacts.  Do not bring any money or jewelry. Please bring picture ID, insurance card, and any prearranged co-payment or hospital payment.  Do not wear make-up, particularly mascara the morning of your surgery.  Do not wear nail polish, particularly if you are having foot /hand

## 2025-03-07 ENCOUNTER — ANESTHESIA EVENT (OUTPATIENT)
Facility: HOSPITAL | Age: 89
End: 2025-03-07
Payer: MEDICARE

## 2025-03-07 ENCOUNTER — ANESTHESIA (OUTPATIENT)
Facility: HOSPITAL | Age: 89
End: 2025-03-07
Payer: MEDICARE

## 2025-03-07 ENCOUNTER — HOSPITAL ENCOUNTER (OUTPATIENT)
Facility: HOSPITAL | Age: 89
Setting detail: OUTPATIENT SURGERY
Discharge: HOME | End: 2025-03-07
Attending: PLASTIC SURGERY | Admitting: PLASTIC SURGERY
Payer: MEDICARE

## 2025-03-07 VITALS
HEART RATE: 69 BPM | WEIGHT: 239.86 LBS | SYSTOLIC BLOOD PRESSURE: 158 MMHG | RESPIRATION RATE: 14 BRPM | BODY MASS INDEX: 37.65 KG/M2 | HEIGHT: 67 IN | TEMPERATURE: 98 F | OXYGEN SATURATION: 95 % | DIASTOLIC BLOOD PRESSURE: 80 MMHG

## 2025-03-07 DIAGNOSIS — D04.4: Primary | ICD-10-CM

## 2025-03-07 PROCEDURE — 2709999900 HC NON-CHARGEABLE SUPPLY: Performed by: PLASTIC SURGERY

## 2025-03-07 PROCEDURE — 2580000003 HC RX 258: Performed by: ANESTHESIOLOGY

## 2025-03-07 PROCEDURE — 6360000002 HC RX W HCPCS: Performed by: ANESTHESIOLOGY

## 2025-03-07 PROCEDURE — 3600000002 HC SURGERY LEVEL 2 BASE: Performed by: PLASTIC SURGERY

## 2025-03-07 PROCEDURE — 2580000003 HC RX 258: Performed by: NURSE ANESTHETIST, CERTIFIED REGISTERED

## 2025-03-07 PROCEDURE — 6370000000 HC RX 637 (ALT 250 FOR IP): Performed by: ANESTHESIOLOGY

## 2025-03-07 PROCEDURE — 3600000012 HC SURGERY LEVEL 2 ADDTL 15MIN: Performed by: PLASTIC SURGERY

## 2025-03-07 PROCEDURE — 7100000000 HC PACU RECOVERY - FIRST 15 MIN: Performed by: PLASTIC SURGERY

## 2025-03-07 PROCEDURE — 6360000002 HC RX W HCPCS: Performed by: PLASTIC SURGERY

## 2025-03-07 PROCEDURE — 3700000000 HC ANESTHESIA ATTENDED CARE: Performed by: PLASTIC SURGERY

## 2025-03-07 PROCEDURE — 7100000001 HC PACU RECOVERY - ADDTL 15 MIN: Performed by: PLASTIC SURGERY

## 2025-03-07 PROCEDURE — 3700000001 HC ADD 15 MINUTES (ANESTHESIA): Performed by: PLASTIC SURGERY

## 2025-03-07 PROCEDURE — 6370000000 HC RX 637 (ALT 250 FOR IP): Performed by: PLASTIC SURGERY

## 2025-03-07 PROCEDURE — 6360000002 HC RX W HCPCS: Performed by: NURSE ANESTHETIST, CERTIFIED REGISTERED

## 2025-03-07 PROCEDURE — 2500000003 HC RX 250 WO HCPCS: Performed by: PLASTIC SURGERY

## 2025-03-07 RX ORDER — ONDANSETRON 2 MG/ML
4 INJECTION INTRAMUSCULAR; INTRAVENOUS ONCE
Status: COMPLETED | OUTPATIENT
Start: 2025-03-07 | End: 2025-03-07

## 2025-03-07 RX ORDER — LIDOCAINE HYDROCHLORIDE AND EPINEPHRINE 10; 10 MG/ML; UG/ML
INJECTION, SOLUTION INFILTRATION; PERINEURAL PRN
Status: DISCONTINUED | OUTPATIENT
Start: 2025-03-07 | End: 2025-03-07 | Stop reason: ALTCHOICE

## 2025-03-07 RX ORDER — PHENYLEPHRINE HCL IN 0.9% NACL 0.4MG/10ML
SYRINGE (ML) INTRAVENOUS
Status: DISCONTINUED | OUTPATIENT
Start: 2025-03-07 | End: 2025-03-07 | Stop reason: SDUPTHER

## 2025-03-07 RX ORDER — ONDANSETRON 2 MG/ML
INJECTION INTRAMUSCULAR; INTRAVENOUS
Status: DISCONTINUED | OUTPATIENT
Start: 2025-03-07 | End: 2025-03-07 | Stop reason: SDUPTHER

## 2025-03-07 RX ORDER — PROPOFOL 10 MG/ML
INJECTION, EMULSION INTRAVENOUS
Status: DISCONTINUED | OUTPATIENT
Start: 2025-03-07 | End: 2025-03-07 | Stop reason: SDUPTHER

## 2025-03-07 RX ORDER — FENTANYL CITRATE 50 UG/ML
INJECTION, SOLUTION INTRAMUSCULAR; INTRAVENOUS
Status: DISCONTINUED | OUTPATIENT
Start: 2025-03-07 | End: 2025-03-07 | Stop reason: SDUPTHER

## 2025-03-07 RX ORDER — SODIUM CHLORIDE, SODIUM LACTATE, POTASSIUM CHLORIDE, CALCIUM CHLORIDE 600; 310; 30; 20 MG/100ML; MG/100ML; MG/100ML; MG/100ML
INJECTION, SOLUTION INTRAVENOUS ONCE
Status: COMPLETED | OUTPATIENT
Start: 2025-03-07 | End: 2025-03-07

## 2025-03-07 RX ORDER — OXYCODONE HYDROCHLORIDE 5 MG/1
5 TABLET ORAL ONCE
Status: COMPLETED | OUTPATIENT
Start: 2025-03-07 | End: 2025-03-07

## 2025-03-07 RX ORDER — HYDROCODONE BITARTRATE AND ACETAMINOPHEN 5; 325 MG/1; MG/1
1 TABLET ORAL EVERY 6 HOURS PRN
Qty: 10 TABLET | Refills: 0 | Status: SHIPPED | OUTPATIENT
Start: 2025-03-07 | End: 2025-03-10

## 2025-03-07 RX ORDER — SODIUM CHLORIDE 9 MG/ML
INJECTION, SOLUTION INTRAVENOUS
Status: DISCONTINUED | OUTPATIENT
Start: 2025-03-07 | End: 2025-03-07 | Stop reason: SDUPTHER

## 2025-03-07 RX ADMIN — Medication 80 MCG: at 11:15

## 2025-03-07 RX ADMIN — Medication 3 AMPULE: at 09:06

## 2025-03-07 RX ADMIN — FENTANYL CITRATE 25 MCG: 50 INJECTION, SOLUTION INTRAMUSCULAR; INTRAVENOUS at 11:42

## 2025-03-07 RX ADMIN — PHENYLEPHRINE HYDROCHLORIDE 30 MCG/MIN: 10 INJECTION INTRAVENOUS at 11:26

## 2025-03-07 RX ADMIN — SODIUM CHLORIDE, SODIUM LACTATE, POTASSIUM CHLORIDE, AND CALCIUM CHLORIDE: .6; .31; .03; .02 INJECTION, SOLUTION INTRAVENOUS at 09:05

## 2025-03-07 RX ADMIN — OXYCODONE HYDROCHLORIDE 5 MG: 5 TABLET ORAL at 12:24

## 2025-03-07 RX ADMIN — ONDANSETRON 4 MG: 2 INJECTION INTRAMUSCULAR; INTRAVENOUS at 11:41

## 2025-03-07 RX ADMIN — SODIUM CHLORIDE: 9 INJECTION, SOLUTION INTRAVENOUS at 10:57

## 2025-03-07 RX ADMIN — ONDANSETRON 4 MG: 2 INJECTION, SOLUTION INTRAMUSCULAR; INTRAVENOUS at 12:15

## 2025-03-07 RX ADMIN — Medication 80 MCG: at 11:22

## 2025-03-07 RX ADMIN — WATER 2000 MG: 1 INJECTION INTRAMUSCULAR; INTRAVENOUS; SUBCUTANEOUS at 11:14

## 2025-03-07 RX ADMIN — PROPOFOL 50 MCG/KG/MIN: 10 INJECTION, EMULSION INTRAVENOUS at 11:02

## 2025-03-07 RX ADMIN — Medication 80 MCG: at 11:36

## 2025-03-07 RX ADMIN — FENTANYL CITRATE 25 MCG: 50 INJECTION, SOLUTION INTRAMUSCULAR; INTRAVENOUS at 11:02

## 2025-03-07 ASSESSMENT — PAIN DESCRIPTION - PAIN TYPE: TYPE: ACUTE PAIN

## 2025-03-07 ASSESSMENT — PAIN SCALES - GENERAL: PAINLEVEL_OUTOF10: 8

## 2025-03-07 ASSESSMENT — PAIN DESCRIPTION - DESCRIPTORS: DESCRIPTORS: SHARP;DISCOMFORT

## 2025-03-07 ASSESSMENT — PAIN DESCRIPTION - ORIENTATION: ORIENTATION: ANTERIOR

## 2025-03-07 ASSESSMENT — PAIN DESCRIPTION - LOCATION: LOCATION: HEAD

## 2025-03-07 NOTE — DISCHARGE INSTRUCTIONS
May shower tomorrow and get incisions wet. Leave yellow bolster dressing in place.    Follow up with Dr. Frazier in 10-14 days. Call [phone] to make appt.    DISCHARGE SUMMARY from Nurse    PATIENT INSTRUCTIONS:    After general anesthesia or intravenous sedation, for 24 hours or while taking prescription narcotics:    Have someone responsible help you with your care  Limit your activities  Do not drive and operate hazardous machinery  Do not make important personal, legal or business decisions  Do not drink alcoholic beverages  If you have not urinated within 8 hours after discharge, please contact your surgeon on call  Resume your medications unless otherwise instructed    From general anesthesia, intravenous sedation, or while taking prescription narcotics, you may experience:    Drowsiness, dizziness, sleepiness, or confusion  Difficulty remembering or delayed reaction times  Difficulty with your balance, especially while walking, move slowly and carefully, do not make sudden position changes  Difficulty focusing or blurred vision    You may not be aware of slight changes in your behavior and/or your reaction time because of the medication used during and after your procedure.    Report the following to your surgeon:  Excessive pain, swelling, redness or odor of or around the surgical area  Temperature over 100.5  Nausea and vomiting lasting longer than 4 hours or if unable to take medications  Any signs of decreased circulation or nerve impairment to extremity: change in color, persistent numbness, tingling, coldness or increase pain  Any questions or concerns         IF YOU REPORT TO AN EMERGENCY ROOM, DOCTOR'S OFFICE OR HOSPITAL WITHIN 24 HOURS AFTER YOUR PROCEDURE, BRING THIS SHEET AND YOUR AFTER VISIT SUMMARY WITH YOU AND GIVE IT TO THE PHYSICIAN OR NURSE ATTENDING YOU.    These are general instructions for a healthy lifestyle (if applicable):    No smoking/ No tobacco products/ Avoid exposure to secondhand  surgery.    If you are discharged home with support stockings, you may remove them after 24 hours. Support stockings are used to help prevent blood clots in the legs following surgery.    TO PREVENT AN INFECTION      WASH YOUR HANDS    To prevent infection, good handwashing is the most important thing you or your caregiver can do.      Wash your hands with soap and water or use the hand  we gave you before you touch any wounds.    SHOWER    Use the antibacterial soap we gave you when you take a shower.     Shower with this soap until your wounds are healed.      To reach all areas of your body, you may need someone to help you.     Don’t forget to clean your belly button with every shower.     USE CLEAN SHEETS    Use freshly cleaned sheets on your bed after surgery.     To keep the surgery site clean, do not allow pets to sleep with you while your wound is still healing.     STOP SMOKING    Stop smoking, or at least cut back on smoking    Smoking slows your healing.      CONTROL YOUR BLOOD SUGAR    High blood sugars slow wound healing.    If you are diabetic, control your blood sugar levels before and after your surgery.    Please take time to review all of your Home Care Instructions and Medication Information sheets provided in your discharge packet. If you have any questions, please contact your surgeon's office. Thank you.    The discharge information has been reviewed with the patient and instruction recipient.  The patient and instruction recipient verbalized understanding.  Discharge medications reviewed with the patient and instruction recipient and appropriate educational materials and side effects teaching were provided.      Please provide this summary of care documentation to your next provider.

## 2025-03-07 NOTE — ANESTHESIA PRE PROCEDURE
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   • Morbid obesity E66.01   • Radiculopathy M54.10   • Sepsis ([facility]) A41.9   • Benign prostatic hyperplasia without lower urinary tract symptoms N40.0   • DVT of lower extremity (deep venous thrombosis) ([facility]) I82.409   • Preoperative clearance Z01.818   • Abnormal stress test R94.39   • Right bundle branch block I45.10   • Gout, unspecified M10.9   • Chronic back pain M54.9, G89.29   • Bradycardia R00.1   • Mobitz II I44.1       Past Medical History:        Diagnosis Date   • Arthritis     back, legs   • At risk for sleep apnea    • CAD (coronary artery disease)     no stents   • Chest pain, unspecified    • Congestive heart failure (HCC)    • Essential hypertension    • Essential hypertension, benign    • GERD (gastroesophageal reflux disease)     diet controlled   • Hx of blood clots 11/01/2023    LUE   • Hypertension    • Lung cancer ([facility])     lf lung 2014   • Mixed hyperlipidemia    • Pneumonia 01/2022   • RBBB    • Recurrent kidney stones    • Skin cancer    • Thromboembolus ([facility]) 2014    Right leg       Past Surgical History:        Procedure Laterality Date   • APPENDECTOMY     • CARDIAC CATHETERIZATION     • CHEST SURGERY  9/3/13    left thoracoscopy; left upper lobectomy; left pericostal nerve block; lymph node dissection   • EP DEVICE PROCEDURE N/A 10/18/2023    Insert PPM dual performed by Colton Schulz MD at [facility] CARDIAC CATH LAB   • EP DEVICE PROCEDURE N/A 10/19/2023    Lead reposition performed by Colton Schulz MD at [facility] CARDIAC CATH LAB   • EP DEVICE PROCEDURE N/A 10/19/2023    Insert PPM dual performed by Colton Schulz MD at [facility] CARDIAC CATH LAB   • HEENT      tonsillectomy   • ORTHOPEDIC SURGERY Bilateral     bilateral knee replacement   • ORTHOPEDIC SURGERY      back surgery x2 1977, right shoulder surgery   • OTHER SURGICAL HISTORY      neck or cancer removal       Social History:    Social History     Tobacco Use   • Smoking

## 2025-03-07 NOTE — BRIEF OP NOTE
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]    Post-Op Diagnosis: Same       Procedure(s):  CLOSURE OF SCALP WOUND WITH LOCAL FLAP  AFTER MOHS PROCEDURE FTSG 5 x 6cm    Surgeon(s):  Jerson Frazier MD    Assistant:  Surgical Assistant: Amando Pinedo    Anesthesia: Monitor Anesthesia Care    Estimated Blood Loss (mL): Minimal    Complications: None    Specimens:   * No specimens in log *    Implants:  * No implants in log *      Drains: * No LDAs found *    Findings:  Infection Present At Time Of Surgery (PATOS) (choose all levels that have infection present):  No infection present  Other Findings: full thickness skin defect, no bone exposed  This procedure was not performed to treat primary cutaneous melanoma through wide local excision    Electronically signed by Jerson Frazier MD on 3/7/2025 at 11:50 AM

## 2025-03-07 NOTE — ANESTHESIA POSTPROCEDURE EVALUATION
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])    Providers: Jerson Frazier MD Responsible Provider: Hernesto Colvin MD    Anesthesia Type: MAC ASA Status: 3            Anesthesia Type: MAC    Kay Phase I: Kay Score: 10    Kay Phase II:      Anesthesia Post Evaluation    Patient location during evaluation: PACU  Patient participation: complete - patient participated  Level of consciousness: sleepy but conscious and responsive to verbal stimuli  Airway patency: patent  Nausea & Vomiting: no vomiting and no nausea  Cardiovascular status: blood pressure returned to baseline and hemodynamically stable  Respiratory status: acceptable  Hydration status: stable    No notable events documented.

## 2025-03-07 NOTE — PERIOP NOTE
Discharge Information Discussed  and Questions Answered with Patients son (Charanjit), via cell Phone.

## 2025-03-19 NOTE — OP NOTE
[facility]              [address] [place]  [address]                            OPERATIVE REPORT      PATIENT NAME: STEVEN WIGGINS              : 10/03/1931  MED REC NO: [id number]                       ROOM: OR  ACCOUNT NO: [id number]                       ADMIT DATE: 2025  PROVIDER: Jerson Frazier MD    DATE OF SERVICE:  2025    PREOPERATIVE DIAGNOSES:  Squamous cell carcinoma of scalp.    POSTOPERATIVE DIAGNOSES:  Squamous cell carcinoma of scalp.    PROCEDURES PERFORMED:  Scalp reconstruction with full-thickness skin graft measuring 5 x 6 cm.    SURGEON:  Jerson Frazier MD    ASSISTANT:  Amando Pinedo SA.    ANESTHESIA:  Local with IV sedation.    ESTIMATED BLOOD LOSS:  Minimal.    SPECIMENS REMOVED:  None.    INTRAOPERATIVE FINDINGS:  Full-thickness scalp defect.     COMPLICATIONS:  None.    IMPLANTS:  None.    INDICATIONS:  This 89-year-old male presented with biopsy-proven squamous carcinoma of the scalp vertex.  He had undergone Mohs excision 1 day prior to this operation and was left with a sizable defect of the scalp vertex that required reconstruction.    DESCRIPTION OF PROCEDURE:  After informed consent was obtained under IV sedation, the scalp and left abdomen were infiltrated with 1% lidocaine with epinephrine and then prepped and draped.  The wound was examined and found to be full-thickness.  There was still pericranium covering bone.  We decided to proceed with skin grafting.  All nonviable tissue was sharply debrided.  The wound was then irrigated.    Attention was turned to the left abdomen, where a full-thickness skin graft was harvested as a transverse ellipse.  The donor site was closed primarily with absorbable suture.  The graft was de-fatted, fenestrated, cut to the appropriate size, and secured to the scalp wound bed using 4-0 plain gut.  I tied over Xeroform bolster was then applied.  He tolerated the procedure well,

## 2025-04-21 ENCOUNTER — HOSPITAL ENCOUNTER (INPATIENT)
Facility: HOSPITAL | Age: 89
LOS: 2 days | Discharge: HOME | DRG: 391 | End: 2025-04-23
Attending: STUDENT IN AN ORGANIZED HEALTH CARE EDUCATION/TRAINING PROGRAM | Admitting: INTERNAL MEDICINE
Payer: MEDICARE

## 2025-04-21 ENCOUNTER — APPOINTMENT (OUTPATIENT)
Facility: HOSPITAL | Age: 89
DRG: 391 | End: 2025-04-21
Payer: MEDICARE

## 2025-04-21 DIAGNOSIS — K52.9 COLITIS: Primary | ICD-10-CM

## 2025-04-21 DIAGNOSIS — K62.5 HEMORRHAGE OF RECTUM AND ANUS: ICD-10-CM

## 2025-04-21 PROBLEM — K92.2 ACUTE LOWER GASTROINTESTINAL BLEEDING: Status: ACTIVE | Noted: 2025-04-21

## 2025-04-21 LAB
ALBUMIN SERPL-MCNC: 3.2 G/DL (ref 3.5–5)
ALBUMIN/GLOB SERPL: 0.7 (ref 1.1–2.2)
ALP SERPL-CCNC: 62 U/L (ref 45–117)
ALT SERPL-CCNC: 26 U/L (ref 12–78)
ANION GAP SERPL CALC-SCNC: 5 MMOL/L (ref 2–12)
AST SERPL-CCNC: 27 U/L (ref 15–37)
BASOPHILS # BLD: 0.05 K/UL (ref 0–0.1)
BASOPHILS NFR BLD: 0.4 % (ref 0–1)
BILIRUB SERPL-MCNC: 0.9 MG/DL (ref 0.2–1)
BUN SERPL-MCNC: 22 MG/DL (ref 6–20)
BUN/CREAT SERPL: 19 (ref 12–20)
CALCIUM SERPL-MCNC: 9.3 MG/DL (ref 8.5–10.1)
CHLORIDE SERPL-SCNC: 104 MMOL/L (ref 97–108)
CO2 SERPL-SCNC: 26 MMOL/L (ref 21–32)
CREAT SERPL-MCNC: 1.16 MG/DL (ref 0.7–1.3)
DIFFERENTIAL METHOD BLD: ABNORMAL
EKG ATRIAL RATE: 66 BPM
EKG DIAGNOSIS: NORMAL
EKG P AXIS: 27 DEGREES
EKG P-R INTERVAL: 210 MS
EKG Q-T INTERVAL: 466 MS
EKG QRS DURATION: 138 MS
EKG QTC CALCULATION (BAZETT): 488 MS
EKG R AXIS: -61 DEGREES
EKG T AXIS: 88 DEGREES
EKG VENTRICULAR RATE: 66 BPM
EOSINOPHIL # BLD: 0.04 K/UL (ref 0–0.4)
EOSINOPHIL NFR BLD: 0.3 % (ref 0–7)
ERYTHROCYTE [DISTWIDTH] IN BLOOD BY AUTOMATED COUNT: 15.7 % (ref 11.5–14.5)
GLOBULIN SER CALC-MCNC: 4.6 G/DL (ref 2–4)
GLUCOSE SERPL-MCNC: 138 MG/DL (ref 65–100)
HCT VFR BLD AUTO: 51.4 % (ref 36.6–50.3)
HGB BLD-MCNC: 16.3 G/DL (ref 12.1–17)
IMM GRANULOCYTES # BLD AUTO: 0.05 K/UL (ref 0–0.04)
IMM GRANULOCYTES NFR BLD AUTO: 0.4 % (ref 0–0.5)
LYMPHOCYTES # BLD: 1.18 K/UL (ref 0.8–3.5)
LYMPHOCYTES NFR BLD: 10.2 % (ref 12–49)
MCH RBC QN AUTO: 30.1 PG (ref 26–34)
MCHC RBC AUTO-ENTMCNC: 31.7 G/DL (ref 30–36.5)
MCV RBC AUTO: 95 FL (ref 80–99)
MONOCYTES # BLD: 1.04 K/UL (ref 0–1)
MONOCYTES NFR BLD: 9 % (ref 5–13)
NEUTS SEG # BLD: 9.2 K/UL (ref 1.8–8)
NEUTS SEG NFR BLD: 79.7 % (ref 32–75)
NRBC # BLD: 0 K/UL (ref 0–0.01)
NRBC BLD-RTO: 0 PER 100 WBC
PLATELET # BLD AUTO: 183 K/UL (ref 150–400)
PMV BLD AUTO: 10.2 FL (ref 8.9–12.9)
POTASSIUM SERPL-SCNC: 4.5 MMOL/L (ref 3.5–5.1)
PROT SERPL-MCNC: 7.8 G/DL (ref 6.4–8.2)
RBC # BLD AUTO: 5.41 M/UL (ref 4.1–5.7)
SODIUM SERPL-SCNC: 135 MMOL/L (ref 136–145)
WBC # BLD AUTO: 11.6 K/UL (ref 4.1–11.1)

## 2025-04-21 PROCEDURE — 86850 RBC ANTIBODY SCREEN: CPT

## 2025-04-21 PROCEDURE — 87040 BLOOD CULTURE FOR BACTERIA: CPT

## 2025-04-21 PROCEDURE — 86901 BLOOD TYPING SEROLOGIC RH(D): CPT

## 2025-04-21 PROCEDURE — 87506 IADNA-DNA/RNA PROBE TQ 6-11: CPT

## 2025-04-21 PROCEDURE — 85025 COMPLETE CBC W/AUTO DIFF WBC: CPT

## 2025-04-21 PROCEDURE — 89055 LEUKOCYTE ASSESSMENT FECAL: CPT

## 2025-04-21 PROCEDURE — 93005 ELECTROCARDIOGRAM TRACING: CPT | Performed by: STUDENT IN AN ORGANIZED HEALTH CARE EDUCATION/TRAINING PROGRAM

## 2025-04-21 PROCEDURE — 6360000002 HC RX W HCPCS: Performed by: INTERNAL MEDICINE

## 2025-04-21 PROCEDURE — 86900 BLOOD TYPING SEROLOGIC ABO: CPT

## 2025-04-21 PROCEDURE — 87449 NOS EACH ORGANISM AG IA: CPT

## 2025-04-21 PROCEDURE — 87324 CLOSTRIDIUM AG IA: CPT

## 2025-04-21 PROCEDURE — 1100000003 HC PRIVATE W/ TELEMETRY

## 2025-04-21 PROCEDURE — 2580000003 HC RX 258: Performed by: STUDENT IN AN ORGANIZED HEALTH CARE EDUCATION/TRAINING PROGRAM

## 2025-04-21 PROCEDURE — 99285 EMERGENCY DEPT VISIT HI MDM: CPT

## 2025-04-21 PROCEDURE — 36415 COLL VENOUS BLD VENIPUNCTURE: CPT

## 2025-04-21 PROCEDURE — 80053 COMPREHEN METABOLIC PANEL: CPT

## 2025-04-21 PROCEDURE — 2500000003 HC RX 250 WO HCPCS: Performed by: INTERNAL MEDICINE

## 2025-04-21 PROCEDURE — 6360000004 HC RX CONTRAST MEDICATION: Performed by: STUDENT IN AN ORGANIZED HEALTH CARE EDUCATION/TRAINING PROGRAM

## 2025-04-21 PROCEDURE — 74178 CT ABD&PLV WO CNTR FLWD CNTR: CPT

## 2025-04-21 PROCEDURE — 6360000002 HC RX W HCPCS: Performed by: STUDENT IN AN ORGANIZED HEALTH CARE EDUCATION/TRAINING PROGRAM

## 2025-04-21 RX ORDER — ONDANSETRON 4 MG/1
4 TABLET, ORALLY DISINTEGRATING ORAL EVERY 8 HOURS PRN
Status: DISCONTINUED | OUTPATIENT
Start: 2025-04-21 | End: 2025-04-23 | Stop reason: HOSPADM

## 2025-04-21 RX ORDER — ENOXAPARIN SODIUM 100 MG/ML
30 INJECTION SUBCUTANEOUS 2 TIMES DAILY
Status: DISCONTINUED | OUTPATIENT
Start: 2025-04-22 | End: 2025-04-21

## 2025-04-21 RX ORDER — POTASSIUM CHLORIDE 1500 MG/1
40 TABLET, EXTENDED RELEASE ORAL PRN
Status: DISCONTINUED | OUTPATIENT
Start: 2025-04-21 | End: 2025-04-23 | Stop reason: HOSPADM

## 2025-04-21 RX ORDER — SODIUM CHLORIDE 0.9 % (FLUSH) 0.9 %
5-40 SYRINGE (ML) INJECTION EVERY 12 HOURS SCHEDULED
Status: DISCONTINUED | OUTPATIENT
Start: 2025-04-21 | End: 2025-04-23 | Stop reason: HOSPADM

## 2025-04-21 RX ORDER — ACETAMINOPHEN 650 MG/1
650 SUPPOSITORY RECTAL EVERY 6 HOURS PRN
Status: DISCONTINUED | OUTPATIENT
Start: 2025-04-21 | End: 2025-04-23 | Stop reason: HOSPADM

## 2025-04-21 RX ORDER — ACETAMINOPHEN 325 MG/1
650 TABLET ORAL EVERY 6 HOURS PRN
Status: DISCONTINUED | OUTPATIENT
Start: 2025-04-21 | End: 2025-04-23 | Stop reason: HOSPADM

## 2025-04-21 RX ORDER — IOPAMIDOL 755 MG/ML
100 INJECTION, SOLUTION INTRAVASCULAR
Status: COMPLETED | OUTPATIENT
Start: 2025-04-21 | End: 2025-04-21

## 2025-04-21 RX ORDER — METRONIDAZOLE 500 MG/100ML
500 INJECTION, SOLUTION INTRAVENOUS EVERY 8 HOURS
Status: DISCONTINUED | OUTPATIENT
Start: 2025-04-22 | End: 2025-04-23 | Stop reason: HOSPADM

## 2025-04-21 RX ORDER — POTASSIUM CHLORIDE 7.45 MG/ML
10 INJECTION INTRAVENOUS PRN
Status: DISCONTINUED | OUTPATIENT
Start: 2025-04-21 | End: 2025-04-23 | Stop reason: HOSPADM

## 2025-04-21 RX ORDER — BISACODYL 10 MG
10 SUPPOSITORY, RECTAL RECTAL DAILY PRN
Status: DISCONTINUED | OUTPATIENT
Start: 2025-04-21 | End: 2025-04-23 | Stop reason: HOSPADM

## 2025-04-21 RX ORDER — SODIUM CHLORIDE 9 MG/ML
INJECTION, SOLUTION INTRAVENOUS PRN
Status: DISCONTINUED | OUTPATIENT
Start: 2025-04-21 | End: 2025-04-23 | Stop reason: HOSPADM

## 2025-04-21 RX ORDER — SODIUM CHLORIDE 0.9 % (FLUSH) 0.9 %
5-40 SYRINGE (ML) INJECTION PRN
Status: DISCONTINUED | OUTPATIENT
Start: 2025-04-21 | End: 2025-04-23 | Stop reason: HOSPADM

## 2025-04-21 RX ORDER — HYDRALAZINE HYDROCHLORIDE 20 MG/ML
20 INJECTION INTRAMUSCULAR; INTRAVENOUS EVERY 6 HOURS PRN
Status: DISCONTINUED | OUTPATIENT
Start: 2025-04-21 | End: 2025-04-23 | Stop reason: HOSPADM

## 2025-04-21 RX ORDER — ONDANSETRON 2 MG/ML
4 INJECTION INTRAMUSCULAR; INTRAVENOUS EVERY 6 HOURS PRN
Status: DISCONTINUED | OUTPATIENT
Start: 2025-04-21 | End: 2025-04-23 | Stop reason: HOSPADM

## 2025-04-21 RX ORDER — TAMSULOSIN HYDROCHLORIDE 0.4 MG/1
0.4 CAPSULE ORAL DAILY
Status: DISCONTINUED | OUTPATIENT
Start: 2025-04-22 | End: 2025-04-23 | Stop reason: HOSPADM

## 2025-04-21 RX ORDER — METOPROLOL SUCCINATE 25 MG/1
12.5 TABLET, EXTENDED RELEASE ORAL DAILY
Status: DISCONTINUED | OUTPATIENT
Start: 2025-04-22 | End: 2025-04-23 | Stop reason: HOSPADM

## 2025-04-21 RX ORDER — ENALAPRIL MALEATE 10 MG/1
10 TABLET ORAL DAILY
Status: DISCONTINUED | OUTPATIENT
Start: 2025-04-22 | End: 2025-04-23 | Stop reason: HOSPADM

## 2025-04-21 RX ADMIN — IOPAMIDOL 100 ML: 755 INJECTION, SOLUTION INTRAVENOUS at 12:14

## 2025-04-21 RX ADMIN — WATER 1000 MG: 1 INJECTION INTRAMUSCULAR; INTRAVENOUS; SUBCUTANEOUS at 23:39

## 2025-04-21 RX ADMIN — SODIUM CHLORIDE, PRESERVATIVE FREE 10 ML: 5 INJECTION INTRAVENOUS at 23:51

## 2025-04-21 RX ADMIN — PIPERACILLIN AND TAZOBACTAM 4500 MG: 4; .5 INJECTION, POWDER, LYOPHILIZED, FOR SOLUTION INTRAVENOUS at 16:04

## 2025-04-21 RX ADMIN — METRONIDAZOLE 500 MG: 500 INJECTION, SOLUTION INTRAVENOUS at 23:50

## 2025-04-21 ASSESSMENT — LIFESTYLE VARIABLES
HOW OFTEN DO YOU HAVE A DRINK CONTAINING ALCOHOL: MONTHLY OR LESS
HOW MANY STANDARD DRINKS CONTAINING ALCOHOL DO YOU HAVE ON A TYPICAL DAY: 1 OR 2

## 2025-04-21 ASSESSMENT — PAIN SCALES - GENERAL
PAINLEVEL_OUTOF10: 0
PAINLEVEL_OUTOF10: 0
PAINLEVEL_OUTOF10: 4

## 2025-04-21 NOTE — PROGRESS NOTES
.End of Shift Note    Bedside shift change report given to PATRICIA Barnhart (oncoming nurse) by Yessenia Reece RN (offgoing nurse).  Report included the following information SBAR, Kardex, and MAR    Shift worked: [id number]     Shift summary and any significant changes:    Patient received from the ED at 1700. Vital signs, assessment, dual skin, and admission database completed. Pt is up x1 w/ walker.      Concerns for physician to address: N/A     Zone phone for oncoming shift:  [phone]       Activity:     Number times ambulated in hallways past shift: 0  Number of times OOB to chair past shift: 0    Cardiac:   Cardiac Monitoring: Yes           Access:  Current line(s): PIV     Genitourinary:        Respiratory:   O2 Device: None (Room air)  Chronic home O2 use?: NO  Incentive spirometer at bedside: NO    GI:  Last BM (including prior to admit): 04/21/25  Current diet:  ADULT DIET; Regular  Passing flatus: YES    Pain Management:   Patient states pain is manageable on current regimen: YES    Skin:  Yomi Scale Score: 17  Interventions: Wound Offloading (Prevention Methods): Bed, pressure reduction mattress, Elevate heels, Pillows, Repositioning, Turning    Patient Safety:  Fall Risk: Nursing Judgement-Fall Risk High(Add Comments): Yes  Fall Risk Interventions  Nursing Judgement-Fall Risk High(Add Comments): Yes  Toilet Every 2 Hours-In Advance of Need: Yes  Hourly Visual Checks: Awake, In bed  Fall Visual Posted: Armband, Socks  Room Door Open: Yes  Alarm On: Bed  Patient Moved Closer to Nursing Station: No    Active Consults:   IP CONSULT TO GI    Length of Stay:  Expected LOS: 3  Actual LOS: 0    Yessenia Reece, RN

## 2025-04-21 NOTE — ED NOTES
Family concerned about patient going home, Dr Lofton at the bedside, plan of care discussed, patient will be admitted for further observation

## 2025-04-21 NOTE — ED PROVIDER NOTES
[facility] EMERGENCY DEPARTMENT  EMERGENCY DEPARTMENT ENCOUNTER       Pt Name: Paulie Jacobsen  MRN: [id number]  Birthdate 10/3/1931  Date of evaluation: 4/21/2025  Provider: Lee Lofton MD   PCP: Tera Villanueva MD  Note Started: 12:02 PM EDT 4/21/25     CHIEF COMPLAINT       Chief Complaint   Patient presents with    Melena     Patient ambulatory to triage w c/o acute onset of bloody stools around 0400 when he woke up.      HISTORY OF PRESENT ILLNESS: 1 or more elements      History From: patient, History limited by: none     Paulie Jacobsen is a 89 y.o. male w hx of CAD, CHF, and thromboembolus presents to the ED with blood per rectum.  Reports that this has been bright red.  Began after having severe pain and a large bowel movement.  Then bloody bowel movements continued.  He reports that he has been taken off Eliquis.  Lives alone and is independent.    Please See MDM for Additional Details of the HPI/PMH  Nursing Notes were all reviewed and agreed with or any disagreements were addressed in the HPI.     REVIEW OF SYSTEMS        Positives and Pertinent negatives as per HPI.    PAST HISTORY     Past Medical History:  Past Medical History:   Diagnosis Date    Arthritis     back, legs    At risk for sleep apnea     CAD (coronary artery disease)     no stents    Chest pain, unspecified     Congestive heart failure (HCC)     Essential hypertension     Essential hypertension, benign     GERD (gastroesophageal reflux disease)     diet controlled    Hx of blood clots 11/01/2023    LUE    Hypertension     Lung cancer (HCC)     lf lung 2014    Mixed hyperlipidemia     Pneumonia 01/2022    RBBB     Recurrent kidney stones     Skin cancer     Thromboembolus (HCC) 2014    Right leg       Past Surgical History:  Past Surgical History:   Procedure Laterality Date    APPENDECTOMY      CARDIAC CATHETERIZATION      CHEST SURGERY  9/3/13    left thoracoscopy; left upper lobectomy; left pericostal nerve block;  lymph node dissection    EP DEVICE PROCEDURE N/A 10/18/2023    Insert PPM dual performed by Colton Schulz MD at [facility] CARDIAC CATH LAB    EP DEVICE PROCEDURE N/A 10/19/2023    Lead reposition performed by Colton Schulz MD at [facility] CARDIAC CATH LAB    EP DEVICE PROCEDURE N/A 10/19/2023    Insert PPM dual performed by Colton Schulz MD at [facility] CARDIAC CATH LAB    HEENT      tonsillectomy    NECK SURGERY N/A 3/7/2025    CLOSURE OF SCALP WOUND WITH LOCAL FLAP  AFTER MOHS PROCEDURE performed by Jerson Frazier MD at [facility] MAIN OR    ORTHOPEDIC SURGERY Bilateral     bilateral knee replacement    ORTHOPEDIC SURGERY      back surgery x2 , right shoulder surgery    OTHER SURGICAL HISTORY      neck or cancer removal       Family History:  Family History   Problem Relation Age of Onset    Heart Disease Brother     Diabetes Brother        Social History:  Social History     Tobacco Use    Smoking status: Former     Current packs/day: 0.00     Types: Cigarettes     Quit date: 1985     Years since quittin.2    Smokeless tobacco: Never   Vaping Use    Vaping status: Never Used   Substance Use Topics    Alcohol use: No    Drug use: No       Allergies:  Allergies   Allergen Reactions    Ciprofloxacin Swelling     In hands and feet    Ranexa [Ranolazine]     Sudafed [Pseudoephedrine]      Affects PSA       CURRENT MEDICATIONS      Previous Medications    ACETAMINOPHEN (TYLENOL) 500 MG TABLET    Take by mouth every 6 hours as needed    ENALAPRIL (VASOTEC) 10 MG TABLET    Take by mouth daily    METOPROLOL SUCCINATE (TOPROL XL) 50 MG EXTENDED RELEASE TABLET    Take 12.5 mg by mouth daily    MULTIPLE VITAMINS-MINERALS (THERAPEUTIC MULTIVITAMIN-MINERALS) TABLET    Take 1 tablet by mouth daily    TAMSULOSIN (FLOMAX) 0.4 MG CAPSULE    Take by mouth daily       SCREENINGS               No data recorded            PHYSICAL EXAM      ED Triage Vitals [25 0905]   Encounter Vitals Group      BP (!) 151/75

## 2025-04-21 NOTE — PLAN OF CARE
Problem: Skin/Tissue Integrity  Goal: Skin integrity remains intact  Description: 1.  Monitor for areas of redness and/or skin breakdown2.  Assess vascular access sites hourly3.  Every 4-6 hours minimum:  Change oxygen saturation probe site4.  Every 4-6 hours:  If on nasal continuous positive airway pressure, respiratory therapy assess nares and determine need for appliance change or resting period  Outcome: Progressing     Problem: ABCDS Injury Assessment  Goal: Absence of physical injury  Outcome: Progressing     Problem: Safety - Adult  Goal: Free from fall injury  Outcome: Progressing

## 2025-04-21 NOTE — ED NOTES
Bedside report given to ADDIS Hastings by ADDIS Granados. Nurse was informed of reason for arrival, vitals, labs, medications, orders, procedures, results, cardiac rhythm, any outstanding and pending orders and plan of care. Opportunity for questions were provided for receiving RN at this time.

## 2025-04-21 NOTE — ED NOTES
Report received from Darwin Steward RN, orders are current and patient remains in CT at this time, family at the bedside, introduced and let them know I would be in when he was back from CT   EOAE (evoked otoacoustic emission)

## 2025-04-21 NOTE — H&P
Hospitalist Admission Note    NAME:   Paulie Jacobsen   : 10/3/1931   MRN: [id number]     Date/Time: 2025 3:18 PM    Patient PCP: Tera Villanueva MD    ______________________________________________________________________  Given the patient's current clinical presentation, I have a high level of concern for decompensation if discharged from the emergency department.  Complex decision making was performed, which includes reviewing the patient's available past medical records, laboratory results, and x-ray films.       My assessment of this patient's clinical condition and my plan of care is as follows.    Assessment / Plan:    Episodes of rectal bleeding overnight POA  Left lower quadrant and suprapubic abdominal pain x 1 day POA remains tender left lower quadrant  Mild colitis of the descending colon POA  Moderate colonic diverticulosis without evidence of diverticulitis POA  Doing well until yesterday, had a normal bowel movement that he strained for in the afternoon   Later in evening developed discomfort in the suprapubic and left lower quadrant   Small loose BM in the evening  Awoke at 3 AM and had a bloody bowel movement with some diarrhea   Bright red, enough to fill the bowl red  Went back to bed  7 AM had a second smaller bowel movement that was bloody, still enough to turn the bowl red  Abdominal pain improved but  left lower quadrant  Called son and came to ED  No further bowel movement since arriving to emergency room  Prior saw Dr Francis at [facility] in past   Last colonoscopy he thinks was 12 to 15 years ago  WBC 11.6  HgB 16.3  PLTs 183,000  Bun/Creat 22 and 1.16  CT abdomen/pelvis IMPRESSION:  1. mild colitis of the descending colon.  2. Moderate colonic diverticulosis without evidence of diverticulitis.  3. No active bleeding.  4. Bilateral common iliac artery aneurysms.  IV zosyn  Etiology unclear   Underlying colitis with bleeding   Occasion a bit unusual for ischemic  by Jerson Frazier MD at [facility] MAIN OR    ORTHOPEDIC SURGERY Bilateral     bilateral knee replacement    ORTHOPEDIC SURGERY      back surgery x2 , right shoulder surgery    OTHER SURGICAL HISTORY      neck or cancer removal       Social History     Tobacco Use    Smoking status: Former     Current packs/day: 0.00     Types: Cigarettes     Quit date: 1985     Years since quittin.2    Smokeless tobacco: Never   Substance Use Topics    Alcohol use: No        Family History   Problem Relation Age of Onset    Heart Disease Brother     Diabetes Brother        Allergies   Allergen Reactions    Ciprofloxacin Swelling     In hands and feet    Ranexa [Ranolazine]     Sudafed [Pseudoephedrine]      Affects PSA        Prior to Admission medications    Medication Sig Start Date End Date Taking? Authorizing Provider   Multiple Vitamins-Minerals (THERAPEUTIC MULTIVITAMIN-MINERALS) tablet Take 1 tablet by mouth daily    Provider, MD Marbin   acetaminophen (TYLENOL) 500 MG tablet Take by mouth every 6 hours as needed    Automatic Reconciliation, Ar   enalapril (VASOTEC) 10 MG tablet Take by mouth daily    Automatic Reconciliation, Ar   metoprolol succinate (TOPROL XL) 50 MG extended release tablet Take 12.5 mg by mouth daily    Automatic Reconciliation, Ar   tamsulosin (FLOMAX) 0.4 MG capsule Take by mouth daily    Automatic Reconciliation, Ar         Objective:   VITALS:    Patient Vitals for the past 24 hrs:   BP Temp Pulse Resp SpO2 Weight   25 1354 -- -- 68 16 95 % --   25 1330 (!) 150/78 -- 65 15 93 % --   25 1300 (!) 147/75 -- 69 18 97 % --   25 1155 -- -- 64 15 94 % --   25 1052 -- -- 64 16 94 % --   25 0905 (!) 151/75 97.7 °F (36.5 °C) 78 20 97 % 109.4 kg (241 lb 2.9 oz)       Temp (24hrs), Av.7 °F (36.5 °C), Min:97.7 °F (36.5 °C), Max:97.7 °F (36.5 °C)             Wt Readings from Last 12 Encounters:   25 109.4 kg (241 lb 2.9 oz)   25 108.8 kg (239 lb

## 2025-04-22 LAB
ABO + RH BLD: NORMAL
ABO + RH BLD: NORMAL
ALBUMIN SERPL-MCNC: 2.6 G/DL (ref 3.5–5)
ALBUMIN/GLOB SERPL: 0.7 (ref 1.1–2.2)
ALP SERPL-CCNC: 51 U/L (ref 45–117)
ALT SERPL-CCNC: 18 U/L (ref 12–78)
ANION GAP SERPL CALC-SCNC: 2 MMOL/L (ref 2–12)
AST SERPL-CCNC: 19 U/L (ref 15–37)
BASOPHILS # BLD: 0.03 K/UL (ref 0–0.1)
BASOPHILS NFR BLD: 0.3 % (ref 0–1)
BILIRUB SERPL-MCNC: 0.5 MG/DL (ref 0.2–1)
BLOOD GROUP ANTIBODIES SERPL: NORMAL
BLOOD GROUP ANTIBODIES SERPL: NORMAL
BUN SERPL-MCNC: 17 MG/DL (ref 6–20)
BUN/CREAT SERPL: 16 (ref 12–20)
C COLI+JEJUNI TUF STL QL NAA+PROBE: NEGATIVE
C DIFF GDH STL QL: NEGATIVE
C DIFF TOX A+B STL QL IA: NEGATIVE
C DIFF TOXIN INTERPRETATION: NORMAL
CALCIUM SERPL-MCNC: 8.7 MG/DL (ref 8.5–10.1)
CHLORIDE SERPL-SCNC: 106 MMOL/L (ref 97–108)
CO2 SERPL-SCNC: 28 MMOL/L (ref 21–32)
CREAT SERPL-MCNC: 1.06 MG/DL (ref 0.7–1.3)
DIFFERENTIAL METHOD BLD: ABNORMAL
EC STX1+STX2 GENES STL QL NAA+PROBE: NEGATIVE
EOSINOPHIL # BLD: 0.09 K/UL (ref 0–0.4)
EOSINOPHIL NFR BLD: 0.8 % (ref 0–7)
ERYTHROCYTE [DISTWIDTH] IN BLOOD BY AUTOMATED COUNT: 15.5 % (ref 11.5–14.5)
ETEC ELTA+ESTB GENES STL QL NAA+PROBE: NEGATIVE
GLOBULIN SER CALC-MCNC: 3.8 G/DL (ref 2–4)
GLUCOSE SERPL-MCNC: 127 MG/DL (ref 65–100)
HCT VFR BLD AUTO: 46.5 % (ref 36.6–50.3)
HCT VFR BLD AUTO: 47.3 % (ref 36.6–50.3)
HGB BLD-MCNC: 15 G/DL (ref 12.1–17)
HGB BLD-MCNC: 15.4 G/DL (ref 12.1–17)
IMM GRANULOCYTES # BLD AUTO: 0.06 K/UL (ref 0–0.04)
IMM GRANULOCYTES NFR BLD AUTO: 0.5 % (ref 0–0.5)
LACTATE SERPL-SCNC: 1.1 MMOL/L (ref 0.4–2)
LYMPHOCYTES # BLD: 0.89 K/UL (ref 0.8–3.5)
LYMPHOCYTES NFR BLD: 8.1 % (ref 12–49)
MCH RBC QN AUTO: 30.2 PG (ref 26–34)
MCHC RBC AUTO-ENTMCNC: 32.3 G/DL (ref 30–36.5)
MCV RBC AUTO: 93.6 FL (ref 80–99)
MONOCYTES # BLD: 0.98 K/UL (ref 0–1)
MONOCYTES NFR BLD: 8.9 % (ref 5–13)
NEUTS SEG # BLD: 8.91 K/UL (ref 1.8–8)
NEUTS SEG NFR BLD: 81.4 % (ref 32–75)
NRBC # BLD: 0 K/UL (ref 0–0.01)
NRBC BLD-RTO: 0 PER 100 WBC
P SHIGELLOIDES DNA STL QL NAA+PROBE: NEGATIVE
PLATELET # BLD AUTO: 170 K/UL (ref 150–400)
PMV BLD AUTO: 10.4 FL (ref 8.9–12.9)
POTASSIUM SERPL-SCNC: 4.3 MMOL/L (ref 3.5–5.1)
PROT SERPL-MCNC: 6.4 G/DL (ref 6.4–8.2)
RBC # BLD AUTO: 4.97 M/UL (ref 4.1–5.7)
SALMONELLA SP SPAO STL QL NAA+PROBE: NEGATIVE
SHIGELLA SP+EIEC IPAH STL QL NAA+PROBE: NEGATIVE
SODIUM SERPL-SCNC: 136 MMOL/L (ref 136–145)
SPECIMEN EXP DATE BLD: NORMAL
SPECIMEN EXP DATE BLD: NORMAL
V CHOL+PARA+VUL DNA STL QL NAA+NON-PROBE: NEGATIVE
WBC # BLD AUTO: 11 K/UL (ref 4.1–11.1)
WBC #/AREA STL HPF: NORMAL /HPF (ref 0–4)
Y ENTEROCOL DNA STL QL NAA+NON-PROBE: NEGATIVE

## 2025-04-22 PROCEDURE — 2500000003 HC RX 250 WO HCPCS: Performed by: INTERNAL MEDICINE

## 2025-04-22 PROCEDURE — 97161 PT EVAL LOW COMPLEX 20 MIN: CPT

## 2025-04-22 PROCEDURE — 80053 COMPREHEN METABOLIC PANEL: CPT

## 2025-04-22 PROCEDURE — 36415 COLL VENOUS BLD VENIPUNCTURE: CPT

## 2025-04-22 PROCEDURE — 97530 THERAPEUTIC ACTIVITIES: CPT

## 2025-04-22 PROCEDURE — 83605 ASSAY OF LACTIC ACID: CPT

## 2025-04-22 PROCEDURE — 86900 BLOOD TYPING SEROLOGIC ABO: CPT

## 2025-04-22 PROCEDURE — 85014 HEMATOCRIT: CPT

## 2025-04-22 PROCEDURE — 97165 OT EVAL LOW COMPLEX 30 MIN: CPT

## 2025-04-22 PROCEDURE — 1100000003 HC PRIVATE W/ TELEMETRY

## 2025-04-22 PROCEDURE — 6360000002 HC RX W HCPCS: Performed by: INTERNAL MEDICINE

## 2025-04-22 PROCEDURE — 86850 RBC ANTIBODY SCREEN: CPT

## 2025-04-22 PROCEDURE — 85018 HEMOGLOBIN: CPT

## 2025-04-22 PROCEDURE — 86901 BLOOD TYPING SEROLOGIC RH(D): CPT

## 2025-04-22 PROCEDURE — 6370000000 HC RX 637 (ALT 250 FOR IP): Performed by: INTERNAL MEDICINE

## 2025-04-22 PROCEDURE — 97535 SELF CARE MNGMENT TRAINING: CPT

## 2025-04-22 PROCEDURE — 85025 COMPLETE CBC W/AUTO DIFF WBC: CPT

## 2025-04-22 RX ADMIN — METRONIDAZOLE 500 MG: 500 INJECTION, SOLUTION INTRAVENOUS at 08:46

## 2025-04-22 RX ADMIN — WATER 1000 MG: 1 INJECTION INTRAMUSCULAR; INTRAVENOUS; SUBCUTANEOUS at 22:50

## 2025-04-22 RX ADMIN — TAMSULOSIN HYDROCHLORIDE 0.4 MG: 0.4 CAPSULE ORAL at 08:38

## 2025-04-22 RX ADMIN — SODIUM CHLORIDE, PRESERVATIVE FREE 10 ML: 5 INJECTION INTRAVENOUS at 21:22

## 2025-04-22 RX ADMIN — METOPROLOL SUCCINATE 12.5 MG: 25 TABLET, EXTENDED RELEASE ORAL at 08:38

## 2025-04-22 RX ADMIN — SODIUM CHLORIDE, PRESERVATIVE FREE 10 ML: 5 INJECTION INTRAVENOUS at 08:39

## 2025-04-22 RX ADMIN — METRONIDAZOLE 500 MG: 500 INJECTION, SOLUTION INTRAVENOUS at 23:54

## 2025-04-22 RX ADMIN — ENALAPRIL MALEATE 10 MG: 10 TABLET ORAL at 08:38

## 2025-04-22 RX ADMIN — METRONIDAZOLE 500 MG: 500 INJECTION, SOLUTION INTRAVENOUS at 15:55

## 2025-04-22 NOTE — PROGRESS NOTES
End of Shift Note    Bedside shift change report given to ADDIS Adler  (oncoming nurse) by Bronwyn Parkinson LPN (offgoing nurse).  Report included the following information SBAR, Kardex, and MAR    Shift worked:  7p-7:30     Shift summary and any significant changes:    Pt tolerated care fairly well. All pt medications administered per MAR. Pt denied having pain. Pt had two small BM's during shift. Pt stool samples collected. Routine rounding completed, call bell within reach.        Concerns for physician to address:       Zone phone for oncoming shift:          Activity:       Cardiac:   Cardiac Monitoring:  yes - Normal sinus rhythm     Access:  Current line(s): PIV     Genitourinary:   Urinary Status: Voiding, Bathroom privileges    Respiratory:   O2 Device: None (Room air)    GI:  Current diet: ADULT DIET; Regular    Pain Management:   Patient states pain is manageable on current regimen: YES    Skin:  Yomi Scale Score: 17  Interventions: Wound Offloading (Prevention Methods): Repositioning, Pillows, Elevate heels  Pressure injury: no    Patient Safety:  Fall Score: Reyes Total Score: 85  Fall Risk Interventions  Nursing Judgement-Fall Risk High(Add Comments): Yes  Toilet Every 2 Hours-In Advance of Need: Yes  Hourly Visual Checks: Awake, In bed  Fall Visual Posted: Armband, Socks  Room Door Open: Yes  Alarm On: Bed  Patient Moved Closer to Nursing Station: No    Active Consults:  IP CONSULT TO GI  IP CONSULT TO GI    Length of Stay:  Expected LOS: 3  Actual LOS: 1      Bronwyn Parkinson LPN

## 2025-04-22 NOTE — PROGRESS NOTES
End of Shift Note    Bedside shift change report given to Sapphire MANCINI (oncoming nurse) by Nayely Mcnamara RN (offgoing nurse).  Report included the following information SBAR, Kardex, Intake/Output, and MAR    Shift worked:  7a - 7p     Shift summary and any significant changes:    Pt tolerated care fairly well. Medications given per MAR. New IV placed during this shift due to old IV leaking.     Hgb level drawn.    Caring rounds completed      Concerns for physician to address:      Zone phone for oncoming shift:         Activity:       Cardiac:   Cardiac Monitoring:  NO     Access:  Current line(s): PIV     Genitourinary:   Urinary Status: Voiding, Bathroom privileges    Respiratory:   O2 Device: None (Room air)    GI:  Current diet: ADULT DIET; Regular    Pain Management:   Patient states pain is manageable on current regimen: N/A    Skin:  Yomi Scale Score: 17  Interventions: Wound Offloading (Prevention Methods): Repositioning, Pillows, Turning  Pressure injury: no    Patient Safety:  Fall Score: Reyes Total Score: 85  Fall Risk Interventions  Nursing Judgement-Fall Risk High(Add Comments): Yes  Toilet Every 2 Hours-In Advance of Need: Yes  Hourly Visual Checks: Awake, In bed  Fall Visual Posted: Armband, Socks  Room Door Open: Yes  Alarm On: Bed  Patient Moved Closer to Nursing Station: No    Active Consults:  IP CONSULT TO GI  IP CONSULT TO GI    Length of Stay:  Expected LOS: 3  Actual LOS: 1      Nayely Mcnamara RN

## 2025-04-22 NOTE — PROGRESS NOTES
Physical Therapy    Orders received, chart reviewed and patient evaluated by physical therapy. Pending progression with skilled acute physical therapy, recommend:    No skilled physical therapy    Patient is cleared for discharge from PT standpoint:  YES [x]     NO []      Recommend with nursing OOB to chair 3x/day and walking daily. Thank you for completing as able in order to maintain patient strength, endurance and independence.     Full evaluation to follow.      Darwin Mack, PT, DPT

## 2025-04-22 NOTE — CONSULTS
Gastroenterology Attending Physician (for Zaid) attestation statement and comments.  This patient was seen and examined by me in a face-to-face visit today. I reviewed the medical record including lab work, imaging and other provider notes. I confirmed the history as described above. I spoke to the patient, reviewed the medical record including lab work, imaging and other provider notes. I discussed this case in detail with EPIFANIO Poole. I formulated an  assessment of this patient and developed a treatment plan. I agree with the above consultation note. I agree with the history, exam and assessment and plan as outlined in the note.  I would like to add the followinyo M seen for acute onset LLQ abd pain and diarrhea with subsequent hematochezia with mild colitis by CT involving descending colon without evidence of diverticulitis, with no further bleeding or pain at this time.  Denies use of OAC, NSAIDS, or similar occurrence in the past.  Last CLN >15yrs ago.  Benign CV, Pulm, and abd exam.  Labs w/ Hgb 16 drop to 15.  CT reviewed as noted.  Suspect acute mild ischemia or infectious colitis.  C.dil less likely but can readily exclude.  Acuity of onset make mass lesion less likely.    Plan:  1) Patient declines colonoscopic investigation  2) Could convert to oral antibiotics  Will sign off.  Mark Lr MD    GI CONSULTATION NOTE  Raleigh Paniagua PA-C  [phone] NP in-hospital cell phone M-F until 4:30  After 5pm or on weekends, please call  for physician on call    NAME: Paulie Jacobsen :  10/3/1931 MRN: [id number]   Attending: Андрей  Primary GI: Zaid  Date/Time:  2025 9:19 AM  Assessment:   BRBPR  Descending Colitis  VSS  HgB 15  MCV nl  BUNCr nl  CT abdomen/pelvis IMPRESSION: 2025  1. mild colitis of the descending colon.  2. Moderate colonic diverticulosis without evidence of diverticulitis.  3. No active bleeding.  4. Bilateral common iliac artery aneurysm      Scope Hx  EGD 2020.   Normocephalic, without obvious abnormality, atraumatic.  Eyes:               Conjunctivae clear and pale, anicteric sclerae.  Pupils are equal  Nose:               Nares normal. No drainage or sinus tenderness.  Throat:             Lips, mucosa, and tongue normal.  No Thrush  Neck:               Supple, symmetrical,  no adenopathy, thyroid: non tender  Back:               Symmetric,  No CVA tenderness.  Lungs:             CTA bilaterally.  No wheezing/rhonchi/rales.  Chest wall:      No tenderness or deformity. No Accessory muscle use.  Heart:              Regular rate and rhythm,  no murmur, rub or gallop.  Abdomen:        Soft, non-tender. Not distended.  Bowel sounds normal. No masses  Extremities:     Atraumatic, No cyanosis.  No edema. No clubbing  Skin:                Texture, turgor normal. No rashes/lesions/jaundice  Lymph:            Cervical, supraclavicular normal.  Psych:             Good insight.  Not depressed.  Not anxious or agitated.  Neurologic:      EOMs intact. No facial asymmetry. No aphasia or slurred speech. Normal                        strength, A/O X 3.     LAB DATA REVIEWED:    Recent Results (from the past 24 hours)   CBC with Auto Differential    Collection Time: 04/21/25 10:11 AM   Result Value Ref Range    WBC 11.6 (H) 4.1 - 11.1 K/uL    RBC 5.41 4.10 - 5.70 M/uL    Hemoglobin 16.3 12.1 - 17.0 g/dL    Hematocrit 51.4 (H) 36.6 - 50.3 %    MCV 95.0 80.0 - 99.0 FL    MCH 30.1 26.0 - 34.0 PG    MCHC 31.7 30.0 - 36.5 g/dL    RDW 15.7 (H) 11.5 - 14.5 %    Platelets 183 150 - 400 K/uL    MPV 10.2 8.9 - 12.9 FL    Nucleated RBCs 0.0 0  WBC    nRBC 0.00 0.00 - 0.01 K/uL    Neutrophils % 79.7 (H) 32.0 - 75.0 %    Lymphocytes % 10.2 (L) 12.0 - 49.0 %    Monocytes % 9.0 5.0 - 13.0 %    Eosinophils % 0.3 0.0 - 7.0 %    Basophils % 0.4 0.0 - 1.0 %    Immature Granulocytes % 0.4 0.0 - 0.5 %    Neutrophils Absolute 9.20 (H) 1.80 - 8.00 K/UL    Lymphocytes Absolute 1.18 0.80 - 3.50 K/UL     Acid, Plasma 1.1 0.4 - 2.0 MMOL/L   TYPE AND SCREEN    Collection Time: 04/22/25  5:15 AM   Result Value Ref Range    Crossmatch expiration date 04/25/2025,2359     ABO/Rh A NEGATIVE     Antibody Screen NEG        IMAGING RESULTS:  I have personally reviewed the imaging reports      Total time spent with patient: 30 minutes ________________________________________________________________________  Care Plan discussed with:  Patient Y   Family     RN               Consultant:       ________________________________________________________________________    ___________________________________________________  Consulting Provider: Raleigh Paniagua PA-C      4/22/2025  9:19 AM

## 2025-04-22 NOTE — PROGRESS NOTES
OCCUPATIONAL THERAPY EVALUATION/DISCHARGE  Patient: Paulie Jacobsen (89 y.o. male)  Date: 4/22/2025  Primary Diagnosis: Hemorrhage of rectum and anus [K62.5]  Acute lower gastrointestinal bleeding [K92.2]  Colitis [K52.9]         Precautions:                    ASSESSMENT :  Based on the objective data below, the patient is AAOx4, overall in good spirits. The patient is close to his baseline, fatigues easily with activity but no LOB or assistance needed. The patient has family support as well as AE for LB dressing as needed. The patient is able to complete bathroom mobility and standing grooming without assistance. Supervision for LB dressing to encourage better positioning for decreasing SOB during tasks. Recommend OOB with staff and walks as able while in the hospital with staff, and bathroom for toileting. No further acute care OT needs.     Functional Outcome Measure:  The patient scored 18/24 on the AM PAC outcome measure which is indicative of no assistance for self-care.      Further skilled acute occupational therapy is not indicated at this time.     PLAN :  Recommend with staff: OOB, bathroom with supervision    Recommendation for discharge: (in order for the patient to meet his/her long term goals):   No skilled occupational therapy    Other factors to consider for discharge: lives alone    IF patient discharges home will need the following DME: patient owns DME required for discharge     SUBJECTIVE:   Patient stated, “I am feeling a lot better, I hope they let me leave today.”    OBJECTIVE DATA SUMMARY:     Past Medical History:   Diagnosis Date    Arthritis     back, legs    At risk for sleep apnea     CAD (coronary artery disease)     no stents    Chest pain, unspecified     Congestive heart failure (HCC)     Essential hypertension     Essential hypertension, benign     GERD (gastroesophageal reflux disease)     diet controlled    Hx of blood clots 11/01/2023    LUE    Hypertension     Lung cancer  physical, cognitive, or psychosocial skills that result in activity limitations and/or participation restrictions LOW Complexity: No comorbidities that affect functional and  no verbal  or physical assist needed to complete eval tasks   Based on the above components, the patient evaluation is determined to be of the following complexity level: Low

## 2025-04-22 NOTE — PROGRESS NOTES
PHYSICAL THERAPY EVALUATION/DISCHARGE    Patient: Paulie Jacobsen (89 y.o. male)  Date: 4/22/2025  Primary Diagnosis: Hemorrhage of rectum and anus [K62.5]  Acute lower gastrointestinal bleeding [K92.2]  Colitis [K52.9]       Precautions:              ASSESSMENT AND RECOMMENDATIONS:  Based on the objective data below, the patient the patient presents with no further acute or post-acute physical therapy needs, s/p admission for lower GI bleed. Patient is currently mobilizing at baseline level, completing all components with independence unsupported. He demonstrates mild exertional dyspnea that he states is chronic (vitals stable on RA). Patient exhibits good safety insight and judgment through describing measures he has implemented at home, such as using the RW at night and holding onto support when reaching for an item off the floor. Patient is safe to discharge once medically cleared.     Functional Outcome Measure:  The patient scored 24 on the [facility] outcome measure which is indicative of reduced odds of requiring post acute SNF/IPR upon d/c .      Further skilled acute physical therapy is not indicated at this time. Thank you for the opportunity to participate in this patient's care. Please consult if there is a change in status.         PLAN :  Recommendation for discharge: (in order for the patient to meet his/her long term goals):   No skilled physical therapy    Other factors to consider for discharge: no additional factors    IF patient discharges home will need the following DME: patient owns DME required for discharge       SUBJECTIVE:   Patient stated “I can walk more if you want.”    OBJECTIVE DATA SUMMARY:     Past Medical History:   Diagnosis Date    Arthritis     back, legs    At risk for sleep apnea     CAD (coronary artery disease)     no stents    Chest pain, unspecified     Congestive heart failure (HCC)     Essential hypertension     Essential hypertension, benign     GERD (gastroesophageal  Independent  Active : Yes  Critical Behavior:  Orientation  Overall Orientation Status: Within Normal Limits  Orientation Level: Oriented X4  Cognition  Overall Cognitive Status: WNL    Hearing:    WFL    Vision/Perceptual:        WFL              Strength:    Strength: Within functional limits    Coordination:  Coordination: Within functional limits    Range Of Motion:  AROM: Within functional limits  PROM: Within functional limits    Functional Mobility:  Bed Mobility:     Bed Mobility Training  Bed Mobility Training: Yes  Supine to Sit: Independent  Scooting: Independent  Transfers:     Transfer Training  Transfer Training: Yes  Sit to Stand: Independent  Stand to Sit: Independent  Balance:               Balance  Sitting: Intact  Standing: Intact  Ambulation/Gait Training:                       Gait  Gait Training: Yes  Overall Level of Assistance: Independent  Distance (ft): 35 Feet  Assistive Device: Gait belt  Base of Support: Widened  Speed/Keysha: Pace decreased (< 100 feet/min)                                                                                                                                                                                                                                                                [facility]®      Basic Mobility Inpatient Short Form (6-Clicks) Version 2    How much help is needed turning from your back to your side while in a flat bed without using bedrails?: None  How much help is needed moving from lying on your back to sitting on the side of a flat bed without using bedrails?: None  How much help is needed moving to and from a bed to a chair?: None  How much help is needed standing up from a chair using your arms?: None  How much help is needed walking in hospital room?: None  How much help is needed climbing 3-5 steps with a railing?: None    AM-PAC Inpatient Mobility Raw Score : 24  AM-PAC Inpatient T-Scale Score : 61.14     Cutoff  score <=171,2,3 had higher odds of discharging home with home health or need of SNF/IPR.    1. Andree Diamond, Liliana Way, Markie Sanchez, Danette Lazar, Jose Manuel Majano, Junior Diamond.  Validity of the AM-PAC “6-Clicks” Inpatient Daily Activity and Basic Mobility Short Forms. Physical Therapy Mar 2014, 94 (2) 379-391; DOI: 10.2522/ptj.91639305  2. Keny PRIETO, Em ARROYO, Angelo ARROYO, Rashad ARROYO. Association of AM-PAC \"6-Clicks\" Basic Mobility and Daily Activity Scores With Discharge Destination. Phys Ther. 2021 4;101(4):hcuz602. doi: 10.1093/ptj/fatf803. PMID: 52926088.  3. Debora ARROYO, Rory D, Sapphire S, Josh K, Brandi S. Activity Measure for Post-Acute Care \"6-Clicks\" Basic Mobility Scores Predict Discharge Destination After Acute Care Hospitalization in Select Patient Groups: A Retrospective, Observational Study. Arch Rehabil Res Clin Transl. 2022 16;4(3):379120. doi: 10.1016/j.arrct..932402. PMID: 27958585; PMCID: DDV5457275.  4. Dalia Soriano S, Amaury FERNÁNDEZ, Rocío ESCALONA. AM-PAC Short Forms Manual 4.0. Revised 2020.                                                                                                                                                                                                                               Pain Ratin/10   Pain Intervention(s):       Activity Tolerance:   Good, observed shortness of breath on exertion, and SpO2 stable on room air    After treatment:   Patient left in no apparent distress sitting up in chair, Call bell within reach, Bed/ chair alarm activated, and Updated patient's board on functional status and mobility recommendations      COMMUNICATION/EDUCATION:   The patient's plan of care was discussed with: occupational therapist and registered nurse    Patient Education  Education Given To: Patient  Education Provided: Role of Therapy;Plan of Care;Energy Conservation  Education Method: Verbal;Teach Back  Barriers to Learning:

## 2025-04-22 NOTE — PROGRESS NOTES
Hospitalist Progress Note    NAME:   Paulie Jacobsen   : 10/3/1931   MRN: [id number]     Date/Time: 2025 1:27 PM  Patient PCP: Tera Villanueva MD    Estimated discharge date: , Home with  ?HH /SN for safety check  Barriers: HH stable, no more bleeding , IV Abx for colitis for 48 hrs      Assessment / Plan:    Episodes of rectal bleeding overnight POA  Left lower quadrant and suprapubic abdominal pain x 1 day POA remains tender left lower quadrant  Mild colitis of the descending colon POA  Moderate colonic diverticulosis without evidence of diverticulitis POA  Doing well until yesterday, had a normal bowel movement that he strained for in the afternoon              Later in evening developed discomfort in the suprapubic and left lower quadrant              Small loose BM in the evening  Awoke at 3 AM and had a bloody bowel movement with some diarrhea              Bright red, enough to fill the bowl red  Went back to bed  7 AM had a second smaller bowel movement that was bloody, still enough to turn the bowl red  Abdominal pain improved but  left lower quadrant  Called son and came to ED  No further bowel movement since arriving to emergency room  Prior saw Dr Mar and Rosi at [facility] in past              Last colonoscopy he thinks was 12 to 15 years ago  WBC 11.6- >11.0  HgB 16.3-> 15.0  PLTs 183,000  Bun/Creat 22 and 1.1->1.0  CT abdomen/pelvis IMPRESSION:  1. mild colitis of the descending colon.  2. Moderate colonic diverticulosis without evidence of diverticulitis.  3. No active bleeding.  4. Bilateral common iliac artery aneurysms.  S/p  IV zosyn in ER  Etiology unclear              Underlying colitis with bleeding              Occasion a bit unusual for ischemic colitis              Diverticular bleed with mild diverticulitis              Underlying mass    Can DC tele today  S/p IV fluids- off it now  Cont empiric IV ceftriaxone and Flagyl for now for Colitis  Cont Diet - cleared

## 2025-04-23 VITALS
DIASTOLIC BLOOD PRESSURE: 65 MMHG | OXYGEN SATURATION: 93 % | SYSTOLIC BLOOD PRESSURE: 133 MMHG | RESPIRATION RATE: 16 BRPM | HEART RATE: 59 BPM | TEMPERATURE: 97.5 F | BODY MASS INDEX: 37.37 KG/M2 | HEIGHT: 67 IN | WEIGHT: 238.1 LBS

## 2025-04-23 LAB
HCT VFR BLD AUTO: 45.8 % (ref 36.6–50.3)
HGB BLD-MCNC: 14.7 G/DL (ref 12.1–17)

## 2025-04-23 PROCEDURE — 6360000002 HC RX W HCPCS: Performed by: INTERNAL MEDICINE

## 2025-04-23 PROCEDURE — 85014 HEMATOCRIT: CPT

## 2025-04-23 PROCEDURE — 2500000003 HC RX 250 WO HCPCS: Performed by: INTERNAL MEDICINE

## 2025-04-23 PROCEDURE — 36415 COLL VENOUS BLD VENIPUNCTURE: CPT

## 2025-04-23 PROCEDURE — 85018 HEMOGLOBIN: CPT

## 2025-04-23 PROCEDURE — 6370000000 HC RX 637 (ALT 250 FOR IP): Performed by: INTERNAL MEDICINE

## 2025-04-23 RX ORDER — METRONIDAZOLE 500 MG/1
500 TABLET ORAL 3 TIMES DAILY
Qty: 15 TABLET | Refills: 0 | Status: SHIPPED | OUTPATIENT
Start: 2025-04-23 | End: 2025-04-28

## 2025-04-23 RX ORDER — CEFDINIR 300 MG/1
300 CAPSULE ORAL 2 TIMES DAILY
Qty: 10 CAPSULE | Refills: 0 | Status: SHIPPED | OUTPATIENT
Start: 2025-04-23 | End: 2025-04-28

## 2025-04-23 RX ADMIN — METRONIDAZOLE 500 MG: 500 INJECTION, SOLUTION INTRAVENOUS at 08:20

## 2025-04-23 RX ADMIN — METOPROLOL SUCCINATE 12.5 MG: 25 TABLET, EXTENDED RELEASE ORAL at 08:18

## 2025-04-23 RX ADMIN — ENALAPRIL MALEATE 10 MG: 10 TABLET ORAL at 08:18

## 2025-04-23 RX ADMIN — TAMSULOSIN HYDROCHLORIDE 0.4 MG: 0.4 CAPSULE ORAL at 08:18

## 2025-04-23 RX ADMIN — SODIUM CHLORIDE, PRESERVATIVE FREE 10 ML: 5 INJECTION INTRAVENOUS at 08:18

## 2025-04-23 NOTE — PROGRESS NOTES
..I have reviewed discharge instructions with the patient. The patient verbalized understanding. Discharge medications reviewed with patient and appropriate educational materials and side effects teaching were provided. Follow-up appointments reviewed. Opportunity for questions and clarification was provided.  Venous access removed without difficulty.  Patient's belongings gathered and sent with patient. Patient is ready for discharge.     Shane Alonso RN

## 2025-04-23 NOTE — PROGRESS NOTES
Physician Progress Note      PATIENT:               STEVEN WIGGINS  CSN #:                  [id number]  :                       10/3/1931  ADMIT DATE:       2025 9:53 AM  DISCH DATE:  RESPONDING  PROVIDER #:        Deisi Mccollum MD          QUERY TEXT:    Colitis is documented in the medical record -   H&P.   Please specify the   type of colitis such as:    The clinical indicators include:  -   H&P-   Mild colitis of the descending colon POA    -  GI consult-  CT reviewed as noted.  Suspect acute mild ischemia or   infectious colitis.  Options provided:  -- Acute Ischemic Colitis  -- Infectious Colitis, Please document other etiology.  -- Other - I will add my own diagnosis  -- Disagree - Not applicable / Not valid  -- Disagree - Clinically unable to determine / Unknown  -- Refer to Clinical Documentation Reviewer    PROVIDER RESPONSE TEXT:    This patient has infectious colitis.    Query created by: Dayanna Singleton on 2025 11:43 AM      Electronically signed by:  Deisi Mccollum MD 2025 12:07 PM

## 2025-04-23 NOTE — CARE COORDINATION
04/23/25 1200   Service Assessment   Patient Orientation Alert and Oriented   Cognition Alert   History Provided By Patient   Primary Caregiver Self   Support Systems Family Members;Children   PCP Verified by CM Yes   Last Visit to PCP Within last 3 months   Prior Functional Level Independent in ADLs/IADLs   Current Functional Level Independent in ADLs/IADLs   Can patient return to prior living arrangement Yes   Ability to make needs known: Fair   Family able to assist with home care needs: Yes   Would you like for me to discuss the discharge plan with any other family members/significant others, and if so, who? Yes   Financial Resources Medicare   Social/Functional History   Lives With Alone   Type of Home House   Active  Yes   Mode of Transportation Car;Family   Discharge Planning   Type of Residence House   Living Arrangements Alone   Patient expects to be discharged to: House   Services At/After Discharge   Transition of Care Consult (CM Consult) N/A   Services At/After Discharge None    Resource Information Provided? No   Mode of Transport at Discharge Other (see comment)  (daughter in law)   Confirm Follow Up Transport Family   Condition of Participation: Discharge Planning   The Patient and/or Patient Representative was provided with a Choice of Provider? Patient   The Patient and/Or Patient Representative agree with the Discharge Plan? Yes   Freedom of Choice list was provided with basic dialogue that supports the patient's individualized plan of care/goals, treatment preferences, and shares the quality data associated with the providers?  Yes     CM aware that pt will transition home with no additional needs.  Pts daughter in law will transition home at the time of d/c.  Pt is known to reside alone with family support.  Pt denies additional needs at the time of d/c.    HAYDEE Rae   [phone]

## 2025-04-23 NOTE — PROGRESS NOTES
End of Shift Note    Bedside shift change report given to ADDIS Lynn (oncoming nurse) by Sapphire Mancilla RN (offgoing nurse).  Report included the following information SBAR, Kardex, and MAR    Shift worked:  1900-700     Shift summary and any significant changes:    Pt received scheduled meds per MAR. Pt denied pain during shift. Pt Cdiff, enteric panel, and other stool samples negative, pt enteric contact isolation discontinued. Labs collected. Pt safety and caring rounds complete.      Concerns for physician to address: None     Zone phone for oncoming shift:  [phone]       Activity:  Level of Assistance: Standby assist, set-up cues, supervision of patient - no hands on  Number times ambulated in hallways past shift: 0  Number of times OOB to chair past shift: 0    Cardiac:   Cardiac Monitoring: No           Access:  Current line(s): PIV     Genitourinary:   Urinary Status: Voiding, Bathroom privileges    Respiratory:   O2 Device: None (Room air)  Chronic home O2 use?: NO  Incentive spirometer at bedside: NO    GI:  Last BM (including prior to admit): 04/21/25  Current diet:  ADULT DIET; Regular  Passing flatus: YES    Pain Management:   Patient states pain is manageable on current regimen: N/A    Skin:  Yomi Scale Score: 17  Interventions: Wound Offloading (Prevention Methods): Repositioning, Pillows, Turning    Patient Safety:  Fall Risk: Nursing Judgement-Fall Risk High(Add Comments): Yes  Fall Risk Interventions  Nursing Judgement-Fall Risk High(Add Comments): Yes  Toilet Every 2 Hours-In Advance of Need: Yes  Hourly Visual Checks: Awake, In bed  Fall Visual Posted: Armband, Socks  Room Door Open: Deferred to promote rest  Alarm On: Bed  Patient Moved Closer to Nursing Station: No    Active Consults:   IP CONSULT TO GI  IP CONSULT TO GI    Length of Stay:  Expected LOS: 3  Actual LOS: 2    Sapphire Mancilla RN

## 2025-04-23 NOTE — DISCHARGE SUMMARY
Discharge Summary    Name: Paulie Jacobsen  [id number]  YOB: 1931 (Age: 89 y.o.)   Date of Admission: 4/21/2025  Date of Discharge: 4/23/2025  Attending Physician: Deisi Mccollum MD    Discharge Diagnosis:   Acute lower GI bleeding  Rectal bleeding  Acute colitis (mild)  Colonic diverticulosis without evidence of diverticulitis  Hypertension  BPH    Consultations:  IP CONSULT TO GI  IP CONSULT TO GI      Brief Admission History/Reason for Admission Per Lee Garay Jr., MD:     magen Jacobsen is a 89 y.o.  male      Prior saw Dr Mar and Rosi at [facility] in past              Last colonoscopy he thinks was 12 to 15 years ago     Essential hypertension on Vasotec and Toprol-XL     BPH on Flomax     Doing well until yesterday, had a normal bowel movement that he strained for in the afternoon              Later in evening developed discomfort in the suprapubic and left lower quadrant              Small loose BM in the evening  Awoke at 3 AM and had a bloody bowel movement with some diarrhea              Bright red, enough to fill the bowl red  Went back to bed  7 AM had a second smaller bowel movement that was bloody, still enough to turn the bowl red  Abdominal pain improved but  left lower quadrant  No chest pain, shortness of breath, cough, headache, fevers, dysuria  No nausea vomiting or melena  Called son and came to ED    Brief Hospital Course by Main Problems:   Acute lower GI bleed  Rectal bleeding  Acute colitis  Colonic diverticulosis:  -no further bleeding  -HB stable  -was treated with ceftriaxone and flagyl for colitis while patient was in the hospital- now being discharged with cefdinir and flagyl to complete the course      Hypertension:  Continue enalapril and metoprolol    BPH:  Continue flomax    Discharge Exam:  Patient seen and examined by me on discharge day.  Pertinent Findings:  Patient Vitals for the past 24 hrs:   BP Temp Temp src  Pulse Resp SpO2 Weight   04/23/25 0817 133/65 97.5 °F (36.4 °C) Oral 59 16 93 % --   04/23/25 0600 -- -- -- -- -- -- 108 kg (238 lb 1.6 oz)   04/22/25 1915 137/70 97.9 °F (36.6 °C) Oral 74 18 94 % --       Gen:    Not in distress  Chest: Clear lungs  CVS:   Regular rhythm.  No edema  Abd:  Soft, not distended, not tender  Neuro: awake, moving all exts    Discharge/Recent Laboratory Results:  Recent Labs     04/22/25  0515      K 4.3      CO2 28   BUN 17   CREATININE 1.06   GLUCOSE 127*   CALCIUM 8.7     Recent Labs     04/22/25  0515 04/22/25  1732 04/23/25  0613   HGB 15.0   < > 14.7   HCT 46.5   < > 45.8   WBC 11.0  --   --      --   --     < > = values in this interval not displayed.       Discharge Medications:     Medication List        START taking these medications      cefdinir 300 MG capsule  Commonly known as: OMNICEF  Take 1 capsule by mouth 2 times daily for 5 days     metroNIDAZOLE 500 MG tablet  Commonly known as: FLAGYL  Take 1 tablet by mouth 3 times daily for 5 days            CONTINUE taking these medications      enalapril 10 MG tablet  Commonly known as: VASOTEC     metoprolol succinate 50 MG extended release tablet  Commonly known as: TOPROL XL     tamsulosin 0.4 MG capsule  Commonly known as: FLOMAX            STOP taking these medications      therapeutic multivitamin-minerals tablet            ASK your doctor about these medications      acetaminophen 500 MG tablet  Commonly known as: TYLENOL               Where to Get Your Medications        These medications were sent to [facility]/pharmacy #[id number] - [place], [place] - [address] - P [phone] - F [phone] [address] [address]      Phone: [phone]   cefdinir 300 MG capsule  metroNIDAZOLE 500 MG tablet             DISPOSITION:    Home with Family:  x     Home with HH/PT/OT/RN:    SNF/LTC:    EMMA:    OTHER:            Code status: DNR  Recommended diet: cardiac diet  Recommended activity:

## 2025-04-23 NOTE — DISCHARGE INSTRUCTIONS
Please follow up with your PCP.    Please return to ED if you have rectal bleeding, lightheadedness, fever, worsening abdominal pain, nausea/vomiting or any other worsening symptoms.

## 2025-04-23 NOTE — PLAN OF CARE
Problem: Chronic Conditions and Co-morbidities  Goal: Patient's chronic conditions and co-morbidity symptoms are monitored and maintained or improved  Outcome: Completed     Problem: Skin/Tissue Integrity  Goal: Skin integrity remains intact  Description: 1.  Monitor for areas of redness and/or skin breakdown2.  Assess vascular access sites hourly3.  Every 4-6 hours minimum:  Change oxygen saturation probe site4.  Every 4-6 hours:  If on nasal continuous positive airway pressure, respiratory therapy assess nares and determine need for appliance change or resting period  Outcome: Completed  Flowsheets (Taken 4/23/2025 1108)  Skin Integrity Remains Intact: Monitor for areas of redness and/or skin breakdown     Problem: ABCDS Injury Assessment  Goal: Absence of physical injury  Outcome: Completed     Problem: Safety - Adult  Goal: Free from fall injury  Outcome: Completed

## 2025-04-27 LAB
BACTERIA SPEC CULT: NORMAL
BACTERIA SPEC CULT: NORMAL
SERVICE CMNT-IMP: NORMAL
SERVICE CMNT-IMP: NORMAL

## 2025-04-30 NOTE — PROGRESS NOTES
Physician Progress Note      PATIENT:               STEVEN WIGGINS  CSN #:                  [id number]  :                       10/3/1931  ADMIT DATE:       2025 9:53 AM  DISCH DATE:        2025 2:01 PM  RESPONDING  PROVIDER #:        Deisi Mccollum MD          QUERY TEXT:    Please  consider     further  specificity of  infectious  colitis.    The clinical indicators include:  per  query  response:     This patient has infectious  colitis.  pt  was  treated  with  IV  zosyn , IV  rocephin  and  IV  Flagyl.  Discharged  with    Cefdinir  and  Flagyl  Options provided:  -- Possible  bacterial intestinal infection  POA  -- Other - I will add my own diagnosis  -- Disagree - Not applicable / Not valid  -- Disagree - Clinically unable to determine / Unknown  -- Refer to Clinical Documentation Reviewer    PROVIDER RESPONSE TEXT:    Possible bacterial intestinal infection POA    Query created by: Dayanna Singleton on 2025 7:08 AM      Electronically signed by:  Deisi Mccollum MD 2025 7:35 AM

## 2025-05-23 ENCOUNTER — HOSPITAL ENCOUNTER (EMERGENCY)
Facility: HOSPITAL | Age: 89
Discharge: HOME | End: 2025-05-23
Attending: EMERGENCY MEDICINE
Payer: MEDICARE

## 2025-05-23 ENCOUNTER — APPOINTMENT (OUTPATIENT)
Facility: HOSPITAL | Age: 89
End: 2025-05-23
Payer: MEDICARE

## 2025-05-23 VITALS
OXYGEN SATURATION: 93 % | DIASTOLIC BLOOD PRESSURE: 78 MMHG | TEMPERATURE: 97.5 F | WEIGHT: 238 LBS | BODY MASS INDEX: 37.35 KG/M2 | RESPIRATION RATE: 17 BRPM | HEART RATE: 66 BPM | HEIGHT: 67 IN | SYSTOLIC BLOOD PRESSURE: 156 MMHG

## 2025-05-23 DIAGNOSIS — R06.00 DYSPNEA, UNSPECIFIED TYPE: ICD-10-CM

## 2025-05-23 DIAGNOSIS — N30.00 ACUTE CYSTITIS WITHOUT HEMATURIA: ICD-10-CM

## 2025-05-23 DIAGNOSIS — R42 LIGHTHEADEDNESS: Primary | ICD-10-CM

## 2025-05-23 LAB
ALBUMIN SERPL-MCNC: 3.1 G/DL (ref 3.5–5)
ALBUMIN/GLOB SERPL: 0.8 (ref 1.1–2.2)
ALP SERPL-CCNC: 57 U/L (ref 45–117)
ALT SERPL-CCNC: 23 U/L (ref 12–78)
ANION GAP SERPL CALC-SCNC: 6 MMOL/L (ref 2–12)
APPEARANCE UR: CLEAR
AST SERPL-CCNC: 17 U/L (ref 15–37)
BACTERIA URNS QL MICRO: NEGATIVE /HPF
BASOPHILS # BLD: 0.05 K/UL (ref 0–0.1)
BASOPHILS NFR BLD: 0.6 % (ref 0–1)
BILIRUB SERPL-MCNC: 0.5 MG/DL (ref 0.2–1)
BILIRUB UR QL: NEGATIVE
BUN SERPL-MCNC: 27 MG/DL (ref 6–20)
BUN/CREAT SERPL: 21 (ref 12–20)
CALCIUM SERPL-MCNC: 9.2 MG/DL (ref 8.5–10.1)
CHLORIDE SERPL-SCNC: 102 MMOL/L (ref 97–108)
CO2 SERPL-SCNC: 27 MMOL/L (ref 21–32)
COLOR UR: ABNORMAL
CREAT SERPL-MCNC: 1.28 MG/DL (ref 0.7–1.3)
DIFFERENTIAL METHOD BLD: ABNORMAL
EKG ATRIAL RATE: 90 BPM
EKG DIAGNOSIS: NORMAL
EKG P AXIS: 58 DEGREES
EKG P-R INTERVAL: 178 MS
EKG Q-T INTERVAL: 446 MS
EKG QRS DURATION: 194 MS
EKG QTC CALCULATION (BAZETT): 545 MS
EKG R AXIS: 256 DEGREES
EKG T AXIS: 54 DEGREES
EKG VENTRICULAR RATE: 90 BPM
EOSINOPHIL # BLD: 0.09 K/UL (ref 0–0.4)
EOSINOPHIL NFR BLD: 1.1 % (ref 0–7)
EPITH CASTS URNS QL MICRO: ABNORMAL /LPF
ERYTHROCYTE [DISTWIDTH] IN BLOOD BY AUTOMATED COUNT: 15.9 % (ref 11.5–14.5)
GLOBULIN SER CALC-MCNC: 4.1 G/DL (ref 2–4)
GLUCOSE SERPL-MCNC: 157 MG/DL (ref 65–100)
GLUCOSE UR STRIP.AUTO-MCNC: NEGATIVE MG/DL
HCT VFR BLD AUTO: 45.8 % (ref 36.6–50.3)
HGB BLD-MCNC: 14.6 G/DL (ref 12.1–17)
HGB UR QL STRIP: NEGATIVE
HYALINE CASTS URNS QL MICRO: ABNORMAL /LPF (ref 0–2)
IMM GRANULOCYTES # BLD AUTO: 0.03 K/UL (ref 0–0.04)
IMM GRANULOCYTES NFR BLD AUTO: 0.4 % (ref 0–0.5)
KETONES UR QL STRIP.AUTO: NEGATIVE MG/DL
LEUKOCYTE ESTERASE UR QL STRIP.AUTO: ABNORMAL
LYMPHOCYTES # BLD: 1.14 K/UL (ref 0.8–3.5)
LYMPHOCYTES NFR BLD: 13.3 % (ref 12–49)
MCH RBC QN AUTO: 30 PG (ref 26–34)
MCHC RBC AUTO-ENTMCNC: 31.9 G/DL (ref 30–36.5)
MCV RBC AUTO: 94.2 FL (ref 80–99)
MONOCYTES # BLD: 0.87 K/UL (ref 0–1)
MONOCYTES NFR BLD: 10.2 % (ref 5–13)
NEUTS SEG # BLD: 6.38 K/UL (ref 1.8–8)
NEUTS SEG NFR BLD: 74.4 % (ref 32–75)
NITRITE UR QL STRIP.AUTO: NEGATIVE
NRBC # BLD: 0 K/UL (ref 0–0.01)
NRBC BLD-RTO: 0 PER 100 WBC
PH UR STRIP: 5.5 (ref 5–8)
PLATELET # BLD AUTO: 178 K/UL (ref 150–400)
PMV BLD AUTO: 9.8 FL (ref 8.9–12.9)
POTASSIUM SERPL-SCNC: 4.3 MMOL/L (ref 3.5–5.1)
PROT SERPL-MCNC: 7.2 G/DL (ref 6.4–8.2)
PROT UR STRIP-MCNC: NEGATIVE MG/DL
RBC # BLD AUTO: 4.86 M/UL (ref 4.1–5.7)
RBC #/AREA URNS HPF: ABNORMAL /HPF (ref 0–5)
SODIUM SERPL-SCNC: 135 MMOL/L (ref 136–145)
SP GR UR REFRACTOMETRY: 1.01
TROPONIN I SERPL HS-MCNC: 25 NG/L (ref 0–76)
URINE CULTURE IF INDICATED: ABNORMAL
UROBILINOGEN UR QL STRIP.AUTO: 0.2 EU/DL (ref 0.2–1)
WBC # BLD AUTO: 8.6 K/UL (ref 4.1–11.1)
WBC URNS QL MICRO: ABNORMAL /HPF (ref 0–4)

## 2025-05-23 PROCEDURE — 6360000002 HC RX W HCPCS: Performed by: EMERGENCY MEDICINE

## 2025-05-23 PROCEDURE — 87088 URINE BACTERIA CULTURE: CPT

## 2025-05-23 PROCEDURE — 81001 URINALYSIS AUTO W/SCOPE: CPT

## 2025-05-23 PROCEDURE — 99285 EMERGENCY DEPT VISIT HI MDM: CPT

## 2025-05-23 PROCEDURE — 80053 COMPREHEN METABOLIC PANEL: CPT

## 2025-05-23 PROCEDURE — 2500000003 HC RX 250 WO HCPCS: Performed by: EMERGENCY MEDICINE

## 2025-05-23 PROCEDURE — 93005 ELECTROCARDIOGRAM TRACING: CPT | Performed by: EMERGENCY MEDICINE

## 2025-05-23 PROCEDURE — 87186 SC STD MICRODIL/AGAR DIL: CPT

## 2025-05-23 PROCEDURE — 87086 URINE CULTURE/COLONY COUNT: CPT

## 2025-05-23 PROCEDURE — 2580000003 HC RX 258: Performed by: EMERGENCY MEDICINE

## 2025-05-23 PROCEDURE — 84484 ASSAY OF TROPONIN QUANT: CPT

## 2025-05-23 PROCEDURE — 96374 THER/PROPH/DIAG INJ IV PUSH: CPT

## 2025-05-23 PROCEDURE — 71046 X-RAY EXAM CHEST 2 VIEWS: CPT

## 2025-05-23 PROCEDURE — 85025 COMPLETE CBC W/AUTO DIFF WBC: CPT

## 2025-05-23 PROCEDURE — 36415 COLL VENOUS BLD VENIPUNCTURE: CPT

## 2025-05-23 RX ORDER — CEPHALEXIN 500 MG/1
500 CAPSULE ORAL 3 TIMES DAILY
Qty: 15 CAPSULE | Refills: 0 | Status: SHIPPED | OUTPATIENT
Start: 2025-05-23 | End: 2025-05-28

## 2025-05-23 RX ORDER — MAG HYDROX/ALUMINUM HYD/SIMETH 400-400-40
1 SUSPENSION, ORAL (FINAL DOSE FORM) ORAL 2 TIMES DAILY
COMMUNITY

## 2025-05-23 RX ORDER — 0.9 % SODIUM CHLORIDE 0.9 %
1000 INTRAVENOUS SOLUTION INTRAVENOUS ONCE
Status: COMPLETED | OUTPATIENT
Start: 2025-05-23 | End: 2025-05-23

## 2025-05-23 RX ADMIN — SODIUM CHLORIDE 1000 ML: 0.9 INJECTION, SOLUTION INTRAVENOUS at 18:37

## 2025-05-23 RX ADMIN — CEFTRIAXONE 1000 MG: 1 INJECTION, POWDER, FOR SOLUTION INTRAMUSCULAR; INTRAVENOUS at 19:44

## 2025-05-23 ASSESSMENT — LIFESTYLE VARIABLES
HOW MANY STANDARD DRINKS CONTAINING ALCOHOL DO YOU HAVE ON A TYPICAL DAY: PATIENT DOES NOT DRINK
HOW OFTEN DO YOU HAVE A DRINK CONTAINING ALCOHOL: NEVER

## 2025-05-23 ASSESSMENT — PAIN - FUNCTIONAL ASSESSMENT: PAIN_FUNCTIONAL_ASSESSMENT: NONE - DENIES PAIN

## 2025-05-23 ASSESSMENT — PAIN SCALES - GENERAL: PAINLEVEL_OUTOF10: 0

## 2025-05-23 NOTE — ED NOTES
Bedside shift change report given to RN Lena (oncoming nurse) by RN Tiffany (offgoing nurse). Report included the following information Nurse Handoff Report, ED Encounter Summary, ED SBAR, and MAR.       What Type Of Note Output Would You Prefer (Optional)?: Standard Output How Severe Are Your Spot(S)?: mild Have Your Spot(S) Been Treated In The Past?: has not been treated Hpi Title: Evaluation of Skin Lesions Additional History: Patient presents here today for TBSE Family Member: Father

## 2025-05-24 ENCOUNTER — APPOINTMENT (OUTPATIENT)
Facility: HOSPITAL | Age: 89
End: 2025-05-24
Payer: MEDICARE

## 2025-05-24 ENCOUNTER — HOSPITAL ENCOUNTER (EMERGENCY)
Facility: HOSPITAL | Age: 89
Discharge: HOME | End: 2025-05-24
Attending: EMERGENCY MEDICINE
Payer: MEDICARE

## 2025-05-24 VITALS
SYSTOLIC BLOOD PRESSURE: 160 MMHG | OXYGEN SATURATION: 98 % | TEMPERATURE: 97.5 F | RESPIRATION RATE: 14 BRPM | HEIGHT: 67 IN | BODY MASS INDEX: 37.35 KG/M2 | DIASTOLIC BLOOD PRESSURE: 73 MMHG | HEART RATE: 64 BPM | WEIGHT: 238 LBS

## 2025-05-24 DIAGNOSIS — I49.3 FREQUENT PVCS: Primary | ICD-10-CM

## 2025-05-24 DIAGNOSIS — N30.00 ACUTE CYSTITIS WITHOUT HEMATURIA: ICD-10-CM

## 2025-05-24 LAB
ALBUMIN SERPL-MCNC: 3.2 G/DL (ref 3.5–5)
ALBUMIN/GLOB SERPL: 0.8 (ref 1.1–2.2)
ALP SERPL-CCNC: 58 U/L (ref 45–117)
ALT SERPL-CCNC: 24 U/L (ref 12–78)
ANION GAP SERPL CALC-SCNC: 9 MMOL/L (ref 2–12)
AST SERPL-CCNC: 20 U/L (ref 15–37)
BACTERIA SPEC CULT: ABNORMAL
BASOPHILS # BLD: 0.03 K/UL (ref 0–0.1)
BASOPHILS NFR BLD: 0.4 % (ref 0–1)
BILIRUB SERPL-MCNC: 0.3 MG/DL (ref 0.2–1)
BUN SERPL-MCNC: 26 MG/DL (ref 6–20)
BUN/CREAT SERPL: 22 (ref 12–20)
CALCIUM SERPL-MCNC: 9.1 MG/DL (ref 8.5–10.1)
CC UR VC: ABNORMAL
CHLORIDE SERPL-SCNC: 102 MMOL/L (ref 97–108)
CO2 SERPL-SCNC: 24 MMOL/L (ref 21–32)
CREAT SERPL-MCNC: 1.2 MG/DL (ref 0.7–1.3)
DIFFERENTIAL METHOD BLD: ABNORMAL
EKG ATRIAL RATE: 80 BPM
EKG DIAGNOSIS: NORMAL
EKG P AXIS: 75 DEGREES
EKG P-R INTERVAL: 156 MS
EKG Q-T INTERVAL: 466 MS
EKG QRS DURATION: 202 MS
EKG QTC CALCULATION (BAZETT): 520 MS
EKG R AXIS: -74 DEGREES
EKG T AXIS: 75 DEGREES
EKG VENTRICULAR RATE: 75 BPM
EOSINOPHIL # BLD: 0.08 K/UL (ref 0–0.4)
EOSINOPHIL NFR BLD: 1.2 % (ref 0–7)
ERYTHROCYTE [DISTWIDTH] IN BLOOD BY AUTOMATED COUNT: 16 % (ref 11.5–14.5)
GLOBULIN SER CALC-MCNC: 3.9 G/DL (ref 2–4)
GLUCOSE SERPL-MCNC: 150 MG/DL (ref 65–100)
HCT VFR BLD AUTO: 46.9 % (ref 36.6–50.3)
HGB BLD-MCNC: 15.1 G/DL (ref 12.1–17)
IMM GRANULOCYTES # BLD AUTO: 0.04 K/UL (ref 0–0.04)
IMM GRANULOCYTES NFR BLD AUTO: 0.6 % (ref 0–0.5)
LYMPHOCYTES # BLD: 1.18 K/UL (ref 0.8–3.5)
LYMPHOCYTES NFR BLD: 17 % (ref 12–49)
MCH RBC QN AUTO: 30.1 PG (ref 26–34)
MCHC RBC AUTO-ENTMCNC: 32.2 G/DL (ref 30–36.5)
MCV RBC AUTO: 93.6 FL (ref 80–99)
MONOCYTES # BLD: 0.77 K/UL (ref 0–1)
MONOCYTES NFR BLD: 11.1 % (ref 5–13)
NEUTS SEG # BLD: 4.85 K/UL (ref 1.8–8)
NEUTS SEG NFR BLD: 69.7 % (ref 32–75)
NRBC # BLD: 0 K/UL (ref 0–0.01)
NRBC BLD-RTO: 0 PER 100 WBC
NT PRO BNP: 373 PG/ML
PLATELET # BLD AUTO: 207 K/UL (ref 150–400)
PMV BLD AUTO: 10.2 FL (ref 8.9–12.9)
POTASSIUM SERPL-SCNC: 4.3 MMOL/L (ref 3.5–5.1)
PROT SERPL-MCNC: 7.1 G/DL (ref 6.4–8.2)
RBC # BLD AUTO: 5.01 M/UL (ref 4.1–5.7)
SERVICE CMNT-IMP: ABNORMAL
SODIUM SERPL-SCNC: 135 MMOL/L (ref 136–145)
TROPONIN I SERPL HS-MCNC: 28 NG/L (ref 0–76)
WBC # BLD AUTO: 7 K/UL (ref 4.1–11.1)

## 2025-05-24 PROCEDURE — 83880 ASSAY OF NATRIURETIC PEPTIDE: CPT

## 2025-05-24 PROCEDURE — 85025 COMPLETE CBC W/AUTO DIFF WBC: CPT

## 2025-05-24 PROCEDURE — 99285 EMERGENCY DEPT VISIT HI MDM: CPT

## 2025-05-24 PROCEDURE — 80053 COMPREHEN METABOLIC PANEL: CPT

## 2025-05-24 PROCEDURE — 36415 COLL VENOUS BLD VENIPUNCTURE: CPT

## 2025-05-24 PROCEDURE — 84484 ASSAY OF TROPONIN QUANT: CPT

## 2025-05-24 PROCEDURE — 71045 X-RAY EXAM CHEST 1 VIEW: CPT

## 2025-05-24 ASSESSMENT — LIFESTYLE VARIABLES
HOW OFTEN DO YOU HAVE A DRINK CONTAINING ALCOHOL: NEVER
HOW MANY STANDARD DRINKS CONTAINING ALCOHOL DO YOU HAVE ON A TYPICAL DAY: PATIENT DOES NOT DRINK

## 2025-05-24 ASSESSMENT — PAIN SCALES - GENERAL: PAINLEVEL_OUTOF10: 0

## 2025-05-24 NOTE — DISCHARGE INSTRUCTIONS
Call Dr. Schulz's office on Tuesday morning, let them know you need to come in for pacemaker adjustment

## 2025-05-24 NOTE — ED PROVIDER NOTES
[facility] EMERGENCY DEPARTMENT  EMERGENCY DEPARTMENT ENCOUNTER       Pt Name: Paulie Jacobsen  MRN: [id number]  Birthdate 10/3/1931  Date of evaluation: 5/23/2025  Provider: Carly Dixon MD   PCP: Tera Villanueva MD  Note Started: 8:49 PM EDT 5/23/25     CHIEF COMPLAINT       Chief Complaint   Patient presents with    Shortness of Breath     Patient ambulatory to triage reporting he was checking his vitals this morning and reports that heart rate was in the 40s; endorses lightheadedness at that time. Patient also endorses shortness of breath. Patient does report having a pacemaker.         HISTORY OF PRESENT ILLNESS: 1 or more elements      History From: Patient, History limited by: none     Paulie Jacobsen is a 89 y.o. male patient with history of heart block, pacemaker, heart disease, BPH, DVT, lower GI bleed, here for bradycardia, lightheadedness and shortness of breath.  Patient states that he was checking his vital signs this morning, and his heart rate was in the 40s.  He also felt lightheaded at the time, states he has shortness of breath with exertion at baseline.  Denies chest pain, cough, fever, leg edema, leg pain, spinning sensation, diarrhea or vomiting.       Please See MDM for Additional Details of the HPI/PMH  Nursing Notes were all reviewed and agreed with or any disagreements were addressed in the HPI.     REVIEW OF SYSTEMS        Positives and Pertinent negatives as per HPI.    PAST HISTORY     Past Medical History:  Past Medical History:   Diagnosis Date    Arthritis     back, legs    At risk for sleep apnea     CAD (coronary artery disease)     no stents    Chest pain, unspecified     Congestive heart failure (HCC)     Essential hypertension     Essential hypertension, benign     GERD (gastroesophageal reflux disease)     diet controlled    Hx of blood clots 11/01/2023    LUE    Hypertension     Lung cancer (HCC)     lf lung 2014    Mixed hyperlipidemia     Pneumonia 01/2022    RBBB   sterile water 10 mL IV syringe (1,000 mg IntraVENous Given 5/23/25 1944)       Medical Decision Making  Amount and/or Complexity of Data Reviewed  Labs: ordered.  Radiology: ordered.  ECG/medicine tests: ordered and independent interpretation performed.     Details: Paced, rate 90    Risk  Prescription drug management.      Patient's pacemaker was interrogated, there were no abnormal rhythms noted    CLINICAL MANAGEMENT TOOLS:    NIHSS: 0      FINAL IMPRESSION     1. Lightheadedness    2. Dyspnea, unspecified type    3. Acute cystitis without hematuria          DISPOSITION/PLAN   Paulie KYUNG Jacobsen's  results have been reviewed with him.  He has been counseled regarding his diagnosis, treatment, and plan.  He verbally conveys understanding and agreement of the signs, symptoms, diagnosis, treatment and prognosis and additionally agrees to follow up as discussed.  He also agrees with the care-plan and conveys that all of his questions have been answered.  I have also provided discharge instructions for him that include: educational information regarding their diagnosis and treatment, and list of reasons why they would want to return to the ED prior to their follow-up appointment, should his condition change.     CLINICAL IMPRESSION    Discharge Note: The patient is stable for discharge home. The signs, symptoms, diagnosis, and discharge instructions have been discussed, understanding conveyed, and agreed upon. The patient is to follow up as recommended or return to ER should their symptoms worsen.      PATIENT REFERRED TO:  Tera Villanueva MD  [address] [facility] [id number] [phone]      As needed    [facility] Emergency Department  [address] [address]  [phone]    If symptoms worsen       DISCHARGE MEDICATIONS:     Medication List        START taking these medications      cephALEXin 500 MG capsule  Commonly known as: KEFLEX  Take 1 capsule by mouth 3 times daily

## 2025-05-24 NOTE — ED TRIAGE NOTES
Pt presents ambulatory to ED via triage for c/o pacemaker not working. Pt reports his HR went down to 39. Pt endorses dizziness/lightheadedness. Pt has [employer] pacemaker. Pt also c/o SOB.

## 2025-05-25 NOTE — ED NOTES
Patient discharged from the ED by DO Solitario. Diagnosis, medications, precautions and follow-ups were reviewed with the patient/family. Questions were asked and answered prior to departure. Patient departed the ED via wheelchair and was accompanied by family to car home.

## 2025-05-25 NOTE — ED PROVIDER NOTES
[facility] EMERGENCY DEPARTMENT  EMERGENCY DEPARTMENT ENCOUNTER       Pt Name: Paulie Jacobsen  MRN: [id number]  Birthdate 10/3/1931  Date of evaluation: 5/24/2025  Provider: Jl Jerez DO   PCP: Tera Villanueva MD  Note Started: 8:51 PM EDT 5/24/25     CHIEF COMPLAINT       Chief Complaint   Patient presents with    Pacemaker Problem     Pt presents ambulatory to ED via triage for c/o pacemaker not working. Pt reports his HR went down to 39. Pt endorses dizziness/lightheadedness. Pt has [employer] pacemaker.     Shortness of Breath     Pt also c/o SOB.        HISTORY OF PRESENT ILLNESS: 1 or more elements      History From: Patient, History limited by: none     Paulie Jacobsen is a 89 y.o. male presents to the emergency department for evaluation of bradycardia weakness and fatigue.       Please See MDM for Additional Details of the HPI/PMH  Nursing Notes were all reviewed and agreed with or any disagreements were addressed in the HPI.     REVIEW OF SYSTEMS        Positives and Pertinent negatives as per HPI.    PAST HISTORY     Past Medical History:  Past Medical History:   Diagnosis Date    Arthritis     back, legs    At risk for sleep apnea     CAD (coronary artery disease)     no stents    Chest pain, unspecified     Congestive heart failure (HCC)     Essential hypertension     Essential hypertension, benign     GERD (gastroesophageal reflux disease)     diet controlled    Hx of blood clots 11/01/2023    LUE    Hypertension     Lung cancer (HCC)     lf lung 2014    Mixed hyperlipidemia     Pneumonia 01/2022    RBBB     Recurrent kidney stones     Skin cancer     Thromboembolus (HCC) 2014    Right leg       Past Surgical History:  Past Surgical History:   Procedure Laterality Date    APPENDECTOMY      CARDIAC CATHETERIZATION      CHEST SURGERY  9/3/13    left thoracoscopy; left upper lobectomy; left pericostal nerve block; lymph node dissection    EP DEVICE PROCEDURE N/A 10/18/2023    Insert PPM dual

## 2025-05-26 LAB
BACTERIA SPEC CULT: ABNORMAL
CC UR VC: ABNORMAL
SERVICE CMNT-IMP: ABNORMAL

## 2025-05-29 LAB
EKG ATRIAL RATE: 80 BPM
EKG DIAGNOSIS: NORMAL
EKG P AXIS: 75 DEGREES
EKG P-R INTERVAL: 156 MS
EKG Q-T INTERVAL: 466 MS
EKG QRS DURATION: 202 MS
EKG QTC CALCULATION (BAZETT): 520 MS
EKG R AXIS: -74 DEGREES
EKG T AXIS: 75 DEGREES
EKG VENTRICULAR RATE: 75 BPM

## (undated) DEVICE — NEEDLE HYPO 18GA L1.5IN PNK S STL HUB POLYPR SHLD REG BVL

## (undated) DEVICE — Device

## (undated) DEVICE — PENCIL SMK EVAC ALL IN 1 DSGN ENH VISIBILITY IMPROVED AIR

## (undated) DEVICE — YANKAUER,TAPERED BULBOUS TIP,W/O VENT: Brand: MEDLINE

## (undated) DEVICE — SPONGE LAP W18XL18IN WHT COT 4 PLY FLD STRUNG RADPQ DISP ST 2 PER PACK

## (undated) DEVICE — 3M™ TEGADERM™ DIAMOND PATTERN FILM DRESSING, 2-3/8 INCH X 2-3/4 INCH, 100 EACH/CARTON, 4 CARTONS/CASE, 1684: Brand: 3M™ TEGADERM™

## (undated) DEVICE — SOLIDIFIER MEDC 1200ML -- CONVERT TO 356117

## (undated) DEVICE — PENCIL ES CRD L10FT HND SWCHING ROCK SWCH W/ EDGE COAT BLDE

## (undated) DEVICE — ELECTRODE PT RET AD L9FT HI MOIST COND ADH HYDRGEL CORDED

## (undated) DEVICE — GLIDESHEATH SLENDER ACCESS KIT: Brand: GLIDESHEATH SLENDER

## (undated) DEVICE — INTENDED FOR TISSUE SEPARATION, AND OTHER PROCEDURES THAT REQUIRE A SHARP SURGICAL BLADE TO PUNCTURE OR CUT.: Brand: BARD-PARKER ® CARBON RIB-BACK BLADES

## (undated) DEVICE — SPONGE LAP 18X18IN STRL -- 5/PK

## (undated) DEVICE — FORCEPS BX L160CM DIA8MM GRSP DISECT CUP TIP NONLOCKING ROT

## (undated) DEVICE — FRAZIER SUCTION INSTRUMENT 7 FR W/CONTROL VENT & OBTURATOR: Brand: FRAZIER

## (undated) DEVICE — GLOVE ORANGE PI 7   MSG9070

## (undated) DEVICE — SUTURE MONOCRYL SZ 3-0 L27IN ABSRB UD L24MM PS-1 3/8 CIR PRIM Y936H

## (undated) DEVICE — Z DISCONTINUED PER MEDLINE LINE GAS SAMPLING O2/CO2 LNG AD 13 FT NSL W/ TBNG FILTERLINE

## (undated) DEVICE — SENSOR PLSE OXMTR AD CBL L3FT ADH TRANSMISSIVE

## (undated) DEVICE — Device: Brand: JELCO

## (undated) DEVICE — SYRINGE,EAR/ULCER, 2 OZ, STERILE: Brand: MEDLINE

## (undated) DEVICE — TRAY,IRRIGATION,PISTON SYRINGE,60ML,STRL: Brand: MEDLINE

## (undated) DEVICE — CATHETER ETER ANGIO L110CM OD5FR ID046IN L75CM 038IN 145DEG CARD

## (undated) DEVICE — SUTURE VCRL SZ 3-0 L18IN ABSRB UD PS-2 L19MM 1/2 CIR J497G

## (undated) DEVICE — CONTAINER SPEC 20 ML LID NEUT BUFF FORMALIN 10 % POLYPR STS

## (undated) DEVICE — NEEDLE HYPO 25GA L1.5IN BLU POLYPR HUB S STL REG BVL STR

## (undated) DEVICE — ESOPHAGEAL BALLOON DILATATION CATHETER: Brand: CRE FIXED WIRE

## (undated) DEVICE — SET ADMIN 16ML TBNG L100IN 2 Y INJ SITE IV PIGGY BK DISP

## (undated) DEVICE — 1200 GUARD II KIT W/5MM TUBE W/O VAC TUBE: Brand: GUARDIAN

## (undated) DEVICE — SUTURE NONABSORBABLE MONOFILAMENT 3-0 PS-1 18 IN BLK ETHILON 1663H

## (undated) DEVICE — TUBING, SUCTION, 1/4" X 10', STRAIGHT: Brand: MEDLINE

## (undated) DEVICE — CATH IV AUTOGRD BC PNK 20GA 25 -- INSYTE

## (undated) DEVICE — HI-TORQUE VERSACORE FLOPPY GUIDE WIRE SYSTEM 145 CM: Brand: HI-TORQUE VERSACORE

## (undated) DEVICE — BLOCK BITE ENDOSCP AD 21 MM W/ DIL BLU LF DISP

## (undated) DEVICE — RADIFOCUS OPTITORQUE ANGIOGRAPHIC CATHETER: Brand: OPTITORQUE

## (undated) DEVICE — SYR 10ML LUER LOK 1/5ML GRAD --

## (undated) DEVICE — SYR 3ML LL TIP 1/10ML GRAD --

## (undated) DEVICE — KIT ACCS INTRO 4FR L10CM NDL 21GA L7CM GWIRE L40CM

## (undated) DEVICE — Z DISCONTINUED NO SUB SUTURE PLN GUT SZ 4-0 L18IN ABSRB YELLOWISH TAN L19MM PS-2 1627H

## (undated) DEVICE — SOLUTION IRRIG 1000ML 0.9% SOD CHL USP POUR PLAS BTL

## (undated) DEVICE — TOWEL 4 PLY TISS 19X30 SUE WHT

## (undated) DEVICE — SOLUTION SCRB 2OZ 10% POVIDONE IOD ANTIMIC BTL

## (undated) DEVICE — SUTURE V LOC 90 4 0 L18IN ABSRB UD P 12 NDL VLOCM0023

## (undated) DEVICE — SOLUTION IRRIG 1000ML 09% SOD CHL USP PIC PLAS CONTAINER

## (undated) DEVICE — ENT-MRMCASU: Brand: MEDLINE INDUSTRIES, INC.

## (undated) DEVICE — SUT PROL 3-0 18IN PS2 BLU --

## (undated) DEVICE — 3M™ IOBAN™ 2 ANTIMICROBIAL INCISE DRAPE 6650EZ: Brand: IOBAN™ 2

## (undated) DEVICE — MEDI-TRACE CADENCE ADULT, DEFIBRILLATION ELECTRODE -RTS  (10 PR/PK) - PHYSIO-CONTROL: Brand: MEDI-TRACE CADENCE

## (undated) DEVICE — 3M™ TEGADERM™ TRANSPARENT FILM DRESSING FRAME STYLE, 1626W, 4 IN X 4-3/4 IN (10 CM X 12 CM), 50/CT 4CT/CASE: Brand: 3M™ TEGADERM™

## (undated) DEVICE — SUTURE V-LOC 180 SZ 2-0 L12IN ABSRB VLT GS-21 L37MM 1/2 CIR VLOCM0315

## (undated) DEVICE — PACEMAKER PACK: Brand: MEDLINE INDUSTRIES, INC.

## (undated) DEVICE — ELECTRODE,RADIOTRANSLUCENT,FOAM,5PK: Brand: MEDLINE

## (undated) DEVICE — SPLINT WR POS F/ARTERIAL ACC -- BX/10

## (undated) DEVICE — BASIN EMSIS 16OZ GRAPHITE PLAS KID SHP MOLD GRAD FOR ORAL

## (undated) DEVICE — SET EXT W/2 NEEDLELESS Y-SITES 4-WAY STOPCOCK

## (undated) DEVICE — NEONATAL-ADULT SPO2 SENSOR: Brand: NELLCOR

## (undated) DEVICE — SUTURE PERMAHAND SZ 0 L30IN NONABSORBABLE BLK L26MM SH 1/2 K834H

## (undated) DEVICE — PROVE COVER: Brand: UNBRANDED

## (undated) DEVICE — ENVELOPE PACEMKR L W2.9XL3.3IN ABSRB ANTIBACT TYRX

## (undated) DEVICE — ROCKER SWITCH PENCIL BLADE ELECTRODE, HOLSTER: Brand: EDGE

## (undated) DEVICE — SYR ASSEMB INFL BLLN 60ML --

## (undated) DEVICE — BAG SPEC BIOHZRD 10 X 10 IN --

## (undated) DEVICE — CATH 5F 100CM JR40 -- DXTERITY

## (undated) DEVICE — SUTURE ETHLN SZ 4-0 L18IN NONABSORBABLE BLK L19MM PS-2 3/8 1667H

## (undated) DEVICE — INTRODUCER SHTH L13CM OD7FR SH ORNG HUB SEAMLESS SAFSHTH

## (undated) DEVICE — TUBING PRSS MON L6IN PVC M FEM CONN

## (undated) DEVICE — ADHESIVE SKIN CLSR 1ML TISS HI VISC EXOFIN

## (undated) DEVICE — KIT INTRO 9FR L13CM DIA0.118IN SPLITTABLE HEMSTAT ROBUST

## (undated) DEVICE — LIQUIBAND RAPID ADHESIVE 36/CS 0.8ML: Brand: MEDLINE

## (undated) DEVICE — GUIDEWIRE VASC L260CM 0.035IN J TIP L3MM PTFE FIX COR NAMIC

## (undated) DEVICE — BAND COMPR L24CM REG CLR PLAS HEMSTAT EXT HK AND LOOP RETEN

## (undated) DEVICE — REM POLYHESIVE ADULT PATIENT RETURN ELECTRODE: Brand: VALLEYLAB